# Patient Record
Sex: MALE | Race: BLACK OR AFRICAN AMERICAN | NOT HISPANIC OR LATINO | Employment: FULL TIME | URBAN - METROPOLITAN AREA
[De-identification: names, ages, dates, MRNs, and addresses within clinical notes are randomized per-mention and may not be internally consistent; named-entity substitution may affect disease eponyms.]

---

## 2021-10-08 ENCOUNTER — OCCUPATIONAL HEALTH (OUTPATIENT)
Dept: URGENT CARE | Facility: CLINIC | Age: 26
End: 2021-10-08

## 2021-10-08 PROCEDURE — 99499 UNLISTED E&M SERVICE: CPT | Mod: S$GLB,,, | Performed by: EMERGENCY MEDICINE

## 2021-10-08 PROCEDURE — 99499 PR PHYSICAL - DOT/CDL: ICD-10-PCS | Mod: S$GLB,,, | Performed by: EMERGENCY MEDICINE

## 2023-03-07 ENCOUNTER — HOSPITAL ENCOUNTER (INPATIENT)
Facility: HOSPITAL | Age: 28
LOS: 9 days | Discharge: HOME | DRG: 286 | End: 2023-03-16
Attending: EMERGENCY MEDICINE | Admitting: FAMILY MEDICINE
Payer: COMMERCIAL

## 2023-03-07 DIAGNOSIS — J81.1 PULMONARY EDEMA: ICD-10-CM

## 2023-03-07 DIAGNOSIS — I42.9 CARDIOMYOPATHY: ICD-10-CM

## 2023-03-07 DIAGNOSIS — I50.41 ACUTE COMBINED SYSTOLIC AND DIASTOLIC CONGESTIVE HEART FAILURE: Primary | ICD-10-CM

## 2023-03-07 DIAGNOSIS — R94.31 ABNORMAL EKG: ICD-10-CM

## 2023-03-07 DIAGNOSIS — R07.9 CHEST PAIN: ICD-10-CM

## 2023-03-07 DIAGNOSIS — I50.9 HEART FAILURE: ICD-10-CM

## 2023-03-07 DIAGNOSIS — I25.10 CAD (CORONARY ARTERY DISEASE): ICD-10-CM

## 2023-03-07 PROBLEM — J81.0 ACUTE PULMONARY EDEMA: Status: ACTIVE | Noted: 2023-03-07

## 2023-03-07 PROBLEM — J18.9 RIGHT MIDDLE LOBE PNEUMONIA: Status: ACTIVE | Noted: 2023-03-07

## 2023-03-07 PROBLEM — E87.1 HYPONATREMIA: Status: ACTIVE | Noted: 2023-03-07

## 2023-03-07 PROBLEM — J96.01 ACUTE RESPIRATORY FAILURE WITH HYPOXIA: Status: ACTIVE | Noted: 2023-03-07

## 2023-03-07 PROBLEM — I16.0 HYPERTENSIVE URGENCY: Status: ACTIVE | Noted: 2023-03-07

## 2023-03-07 PROBLEM — R79.89 ELEVATED TROPONIN: Status: ACTIVE | Noted: 2023-03-07

## 2023-03-07 PROBLEM — I10 PRIMARY HYPERTENSION: Status: ACTIVE | Noted: 2023-03-07

## 2023-03-07 LAB
ALBUMIN SERPL BCP-MCNC: 3.7 G/DL (ref 3.5–5.2)
ALLENS TEST: ABNORMAL
ALP SERPL-CCNC: 58 U/L (ref 55–135)
ALT SERPL W/O P-5'-P-CCNC: 27 U/L (ref 10–44)
ANION GAP SERPL CALC-SCNC: 7 MMOL/L (ref 8–16)
AST SERPL-CCNC: 25 U/L (ref 10–40)
BASOPHILS # BLD AUTO: 0.06 K/UL (ref 0–0.2)
BASOPHILS NFR BLD: 0.5 % (ref 0–1.9)
BILIRUB SERPL-MCNC: 1.7 MG/DL (ref 0.1–1)
BNP SERPL-MCNC: 497 PG/ML (ref 0–99)
BUN SERPL-MCNC: 15 MG/DL (ref 6–20)
CALCIUM SERPL-MCNC: 8.4 MG/DL (ref 8.7–10.5)
CHLORIDE SERPL-SCNC: 101 MMOL/L (ref 95–110)
CK SERPL-CCNC: 321 U/L (ref 20–200)
CO2 SERPL-SCNC: 25 MMOL/L (ref 23–29)
CREAT SERPL-MCNC: 1.3 MG/DL (ref 0.5–1.4)
CRP SERPL-MCNC: 5.79 MG/DL
DELSYS: ABNORMAL
DIFFERENTIAL METHOD: ABNORMAL
EOSINOPHIL # BLD AUTO: 0.1 K/UL (ref 0–0.5)
EOSINOPHIL NFR BLD: 0.6 % (ref 0–8)
EP: 10
ERYTHROCYTE [DISTWIDTH] IN BLOOD BY AUTOMATED COUNT: 14.9 % (ref 11.5–14.5)
ERYTHROCYTE [SEDIMENTATION RATE] IN BLOOD BY WESTERGREN METHOD: 18 MM/H
EST. GFR  (NO RACE VARIABLE): >60 ML/MIN/1.73 M^2
FIO2: 50
GLUCOSE SERPL-MCNC: 112 MG/DL (ref 70–110)
HCO3 UR-SCNC: 24.3 MMOL/L (ref 24–28)
HCT VFR BLD AUTO: 37.1 % (ref 40–54)
HGB BLD-MCNC: 12.6 G/DL (ref 14–18)
IMM GRANULOCYTES # BLD AUTO: 0.04 K/UL (ref 0–0.04)
IMM GRANULOCYTES NFR BLD AUTO: 0.3 % (ref 0–0.5)
INFLUENZA A, MOLECULAR: NEGATIVE
INFLUENZA B, MOLECULAR: NEGATIVE
IP: 16
LACTATE SERPL-SCNC: 1.1 MMOL/L (ref 0.5–1.9)
LYMPHOCYTES # BLD AUTO: 3.8 K/UL (ref 1–4.8)
LYMPHOCYTES NFR BLD: 31.9 % (ref 18–48)
MAGNESIUM SERPL-MCNC: 1.9 MG/DL (ref 1.6–2.6)
MCH RBC QN AUTO: 26.3 PG (ref 27–31)
MCHC RBC AUTO-ENTMCNC: 34 G/DL (ref 32–36)
MCV RBC AUTO: 77 FL (ref 82–98)
MIN VOL: 17.5
MODE: ABNORMAL
MONOCYTES # BLD AUTO: 0.9 K/UL (ref 0.3–1)
MONOCYTES NFR BLD: 7.2 % (ref 4–15)
NEUTROPHILS # BLD AUTO: 7.2 K/UL (ref 1.8–7.7)
NEUTROPHILS NFR BLD: 59.5 % (ref 38–73)
NRBC BLD-RTO: 0 /100 WBC
PCO2 BLDA: 39.8 MMHG (ref 35–45)
PH SMN: 7.39 [PH] (ref 7.35–7.45)
PLATELET # BLD AUTO: 329 K/UL (ref 150–450)
PMV BLD AUTO: 11.7 FL (ref 9.2–12.9)
PO2 BLDA: 50 MMHG (ref 80–100)
POC BE: -1 MMOL/L
POC SATURATED O2: 85 % (ref 95–100)
POC TCO2: 25 MMOL/L (ref 23–27)
POTASSIUM SERPL-SCNC: 3.5 MMOL/L (ref 3.5–5.1)
PROCALCITONIN SERPL IA-MCNC: 0.05 NG/ML (ref 0–0.5)
PROT SERPL-MCNC: 7.3 G/DL (ref 6–8.4)
RBC # BLD AUTO: 4.8 M/UL (ref 4.6–6.2)
SAMPLE: ABNORMAL
SARS-COV-2 RDRP RESP QL NAA+PROBE: NEGATIVE
SITE: ABNORMAL
SODIUM SERPL-SCNC: 133 MMOL/L (ref 136–145)
SP02: 97
SPECIMEN SOURCE: NORMAL
SPONT RATE: 18
TROPONIN I SERPL HS-MCNC: 25.3 PG/ML (ref 0–14.9)
TROPONIN I SERPL HS-MCNC: 25.9 PG/ML (ref 0–14.9)
WBC # BLD AUTO: 12.05 K/UL (ref 3.9–12.7)

## 2023-03-07 PROCEDURE — 36415 COLL VENOUS BLD VENIPUNCTURE: CPT | Performed by: EMERGENCY MEDICINE

## 2023-03-07 PROCEDURE — 85025 COMPLETE CBC W/AUTO DIFF WBC: CPT

## 2023-03-07 PROCEDURE — 99900035 HC TECH TIME PER 15 MIN (STAT)

## 2023-03-07 PROCEDURE — 63600175 PHARM REV CODE 636 W HCPCS: Performed by: STUDENT IN AN ORGANIZED HEALTH CARE EDUCATION/TRAINING PROGRAM

## 2023-03-07 PROCEDURE — 83880 ASSAY OF NATRIURETIC PEPTIDE: CPT

## 2023-03-07 PROCEDURE — 94799 UNLISTED PULMONARY SVC/PX: CPT

## 2023-03-07 PROCEDURE — 84484 ASSAY OF TROPONIN QUANT: CPT | Mod: 91 | Performed by: NURSE PRACTITIONER

## 2023-03-07 PROCEDURE — U0002 COVID-19 LAB TEST NON-CDC: HCPCS | Performed by: STUDENT IN AN ORGANIZED HEALTH CARE EDUCATION/TRAINING PROGRAM

## 2023-03-07 PROCEDURE — 83735 ASSAY OF MAGNESIUM: CPT

## 2023-03-07 PROCEDURE — 93010 ELECTROCARDIOGRAM REPORT: CPT | Mod: ,,, | Performed by: INTERNAL MEDICINE

## 2023-03-07 PROCEDURE — 94640 AIRWAY INHALATION TREATMENT: CPT

## 2023-03-07 PROCEDURE — 93010 EKG 12-LEAD: ICD-10-PCS | Mod: ,,, | Performed by: INTERNAL MEDICINE

## 2023-03-07 PROCEDURE — 21400001 HC TELEMETRY ROOM

## 2023-03-07 PROCEDURE — 86140 C-REACTIVE PROTEIN: CPT | Performed by: NURSE PRACTITIONER

## 2023-03-07 PROCEDURE — 80053 COMPREHEN METABOLIC PANEL: CPT

## 2023-03-07 PROCEDURE — 93005 ELECTROCARDIOGRAM TRACING: CPT | Performed by: INTERNAL MEDICINE

## 2023-03-07 PROCEDURE — 94660 CPAP INITIATION&MGMT: CPT

## 2023-03-07 PROCEDURE — 96365 THER/PROPH/DIAG IV INF INIT: CPT

## 2023-03-07 PROCEDURE — 99900031 HC PATIENT EDUCATION (STAT)

## 2023-03-07 PROCEDURE — 84145 PROCALCITONIN (PCT): CPT | Performed by: NURSE PRACTITIONER

## 2023-03-07 PROCEDURE — 25000003 PHARM REV CODE 250: Performed by: STUDENT IN AN ORGANIZED HEALTH CARE EDUCATION/TRAINING PROGRAM

## 2023-03-07 PROCEDURE — 96366 THER/PROPH/DIAG IV INF ADDON: CPT

## 2023-03-07 PROCEDURE — 83605 ASSAY OF LACTIC ACID: CPT | Performed by: STUDENT IN AN ORGANIZED HEALTH CARE EDUCATION/TRAINING PROGRAM

## 2023-03-07 PROCEDURE — 87502 INFLUENZA DNA AMP PROBE: CPT | Performed by: STUDENT IN AN ORGANIZED HEALTH CARE EDUCATION/TRAINING PROGRAM

## 2023-03-07 PROCEDURE — 84484 ASSAY OF TROPONIN QUANT: CPT

## 2023-03-07 PROCEDURE — 27000221 HC OXYGEN, UP TO 24 HOURS

## 2023-03-07 PROCEDURE — 99285 EMERGENCY DEPT VISIT HI MDM: CPT | Mod: 25

## 2023-03-07 PROCEDURE — 96368 THER/DIAG CONCURRENT INF: CPT

## 2023-03-07 PROCEDURE — 25000242 PHARM REV CODE 250 ALT 637 W/ HCPCS: Performed by: NURSE PRACTITIONER

## 2023-03-07 PROCEDURE — 82803 BLOOD GASES ANY COMBINATION: CPT

## 2023-03-07 PROCEDURE — 96367 TX/PROPH/DG ADDL SEQ IV INF: CPT

## 2023-03-07 PROCEDURE — 94761 N-INVAS EAR/PLS OXIMETRY MLT: CPT

## 2023-03-07 PROCEDURE — 82550 ASSAY OF CK (CPK): CPT | Performed by: EMERGENCY MEDICINE

## 2023-03-07 RX ORDER — ALBUTEROL SULFATE 90 UG/1
2 AEROSOL, METERED RESPIRATORY (INHALATION) EVERY 4 HOURS PRN
COMMUNITY
Start: 2023-02-14 | End: 2023-08-29

## 2023-03-07 RX ORDER — ENOXAPARIN SODIUM 100 MG/ML
40 INJECTION SUBCUTANEOUS EVERY 24 HOURS
Status: DISCONTINUED | OUTPATIENT
Start: 2023-03-07 | End: 2023-03-07

## 2023-03-07 RX ORDER — HYDRALAZINE HYDROCHLORIDE 20 MG/ML
10 INJECTION INTRAMUSCULAR; INTRAVENOUS EVERY 6 HOURS PRN
Status: DISCONTINUED | OUTPATIENT
Start: 2023-03-07 | End: 2023-03-16 | Stop reason: HOSPADM

## 2023-03-07 RX ORDER — LOSARTAN POTASSIUM 50 MG/1
50 TABLET ORAL DAILY
Status: ON HOLD | COMMUNITY
Start: 2023-02-01 | End: 2023-03-16 | Stop reason: HOSPADM

## 2023-03-07 RX ORDER — IBUPROFEN 200 MG
24 TABLET ORAL
Status: DISCONTINUED | OUTPATIENT
Start: 2023-03-07 | End: 2023-03-14

## 2023-03-07 RX ORDER — IBUPROFEN 200 MG
16 TABLET ORAL
Status: DISCONTINUED | OUTPATIENT
Start: 2023-03-07 | End: 2023-03-14

## 2023-03-07 RX ORDER — SODIUM,POTASSIUM PHOSPHATES 280-250MG
2 POWDER IN PACKET (EA) ORAL
Status: DISCONTINUED | OUTPATIENT
Start: 2023-03-07 | End: 2023-03-16 | Stop reason: HOSPADM

## 2023-03-07 RX ORDER — TALC
6 POWDER (GRAM) TOPICAL NIGHTLY PRN
Status: DISCONTINUED | OUTPATIENT
Start: 2023-03-07 | End: 2023-03-16 | Stop reason: HOSPADM

## 2023-03-07 RX ORDER — ALBUTEROL SULFATE 90 UG/1
AEROSOL, METERED RESPIRATORY (INHALATION)
COMMUNITY
Start: 2023-02-14 | End: 2023-03-07

## 2023-03-07 RX ORDER — BENZONATATE 100 MG/1
100 CAPSULE ORAL 3 TIMES DAILY
Status: DISCONTINUED | OUTPATIENT
Start: 2023-03-07 | End: 2023-03-11

## 2023-03-07 RX ORDER — NAPROXEN SODIUM 220 MG/1
324 TABLET, FILM COATED ORAL DAILY
Status: DISCONTINUED | OUTPATIENT
Start: 2023-03-07 | End: 2023-03-16 | Stop reason: HOSPADM

## 2023-03-07 RX ORDER — FUROSEMIDE 10 MG/ML
40 INJECTION INTRAMUSCULAR; INTRAVENOUS
Status: DISPENSED | OUTPATIENT
Start: 2023-03-07 | End: 2023-03-08

## 2023-03-07 RX ORDER — NALOXONE HCL 0.4 MG/ML
0.02 VIAL (ML) INJECTION
Status: DISCONTINUED | OUTPATIENT
Start: 2023-03-07 | End: 2023-03-16 | Stop reason: HOSPADM

## 2023-03-07 RX ORDER — SODIUM CHLORIDE 0.9 % (FLUSH) 0.9 %
10 SYRINGE (ML) INJECTION EVERY 12 HOURS PRN
Status: DISCONTINUED | OUTPATIENT
Start: 2023-03-07 | End: 2023-03-16 | Stop reason: HOSPADM

## 2023-03-07 RX ORDER — ENOXAPARIN SODIUM 100 MG/ML
40 INJECTION SUBCUTANEOUS EVERY 12 HOURS
Status: DISCONTINUED | OUTPATIENT
Start: 2023-03-07 | End: 2023-03-16 | Stop reason: HOSPADM

## 2023-03-07 RX ORDER — AMLODIPINE BESYLATE 5 MG/1
10 TABLET ORAL DAILY
Status: DISCONTINUED | OUTPATIENT
Start: 2023-03-08 | End: 2023-03-08

## 2023-03-07 RX ORDER — LANOLIN ALCOHOL/MO/W.PET/CERES
800 CREAM (GRAM) TOPICAL
Status: DISCONTINUED | OUTPATIENT
Start: 2023-03-07 | End: 2023-03-16 | Stop reason: HOSPADM

## 2023-03-07 RX ORDER — LOSARTAN POTASSIUM 25 MG/1
50 TABLET ORAL DAILY
Status: DISCONTINUED | OUTPATIENT
Start: 2023-03-08 | End: 2023-03-07

## 2023-03-07 RX ORDER — CALCIUM CARBONATE 200(500)MG
500 TABLET,CHEWABLE ORAL
Status: DISCONTINUED | OUTPATIENT
Start: 2023-03-08 | End: 2023-03-16 | Stop reason: HOSPADM

## 2023-03-07 RX ORDER — NITROGLYCERIN 20 MG/100ML
5 INJECTION INTRAVENOUS CONTINUOUS
Status: DISCONTINUED | OUTPATIENT
Start: 2023-03-07 | End: 2023-03-07

## 2023-03-07 RX ORDER — LORATADINE 10 MG/1
10 TABLET ORAL DAILY
COMMUNITY
Start: 2023-02-01 | End: 2023-03-07

## 2023-03-07 RX ORDER — LOSARTAN POTASSIUM 50 MG/1
50 TABLET ORAL
COMMUNITY
Start: 2023-02-01 | End: 2023-03-07

## 2023-03-07 RX ORDER — TRIAMTERENE/HYDROCHLOROTHIAZID 37.5-25 MG
1 TABLET ORAL DAILY
Status: DISCONTINUED | OUTPATIENT
Start: 2023-03-08 | End: 2023-03-09

## 2023-03-07 RX ORDER — LEVALBUTEROL INHALATION SOLUTION 0.63 MG/3ML
0.63 SOLUTION RESPIRATORY (INHALATION) EVERY 8 HOURS
Status: DISCONTINUED | OUTPATIENT
Start: 2023-03-08 | End: 2023-03-12

## 2023-03-07 RX ORDER — FUROSEMIDE 10 MG/ML
40 INJECTION INTRAMUSCULAR; INTRAVENOUS
Status: COMPLETED | OUTPATIENT
Start: 2023-03-07 | End: 2023-03-07

## 2023-03-07 RX ORDER — GLUCAGON 1 MG
1 KIT INJECTION
Status: DISCONTINUED | OUTPATIENT
Start: 2023-03-07 | End: 2023-03-14

## 2023-03-07 RX ORDER — FAMOTIDINE 20 MG/1
20 TABLET, FILM COATED ORAL 2 TIMES DAILY
Status: DISCONTINUED | OUTPATIENT
Start: 2023-03-07 | End: 2023-03-16 | Stop reason: HOSPADM

## 2023-03-07 RX ADMIN — ASPIRIN 81 MG CHEWABLE TABLET 324 MG: 81 TABLET CHEWABLE at 05:03

## 2023-03-07 RX ADMIN — NITROGLYCERIN 50 MCG/MIN: 20 INJECTION INTRAVENOUS at 04:03

## 2023-03-07 RX ADMIN — LEVALBUTEROL HYDROCHLORIDE 0.63 MG: 0.63 SOLUTION RESPIRATORY (INHALATION) at 11:03

## 2023-03-07 RX ADMIN — AZITHROMYCIN MONOHYDRATE 500 MG: 500 INJECTION, POWDER, LYOPHILIZED, FOR SOLUTION INTRAVENOUS at 05:03

## 2023-03-07 RX ADMIN — FUROSEMIDE 40 MG: 10 INJECTION, SOLUTION INTRAMUSCULAR; INTRAVENOUS at 07:03

## 2023-03-07 RX ADMIN — CEFTRIAXONE 2 G: 2 INJECTION, SOLUTION INTRAVENOUS at 05:03

## 2023-03-07 NOTE — Clinical Note
The catheter was repositioned into the ostium   right coronary artery. An angiography was performed of the right coronary arteries. Multiple views were taken. The angiography was performed via hand injection with 7 mL of contrast.

## 2023-03-07 NOTE — Clinical Note
The catheter was repositioned into the ostium   left main. An angiography was performed of the left coronary arteries. Multiple views were taken. The angiography was performed via hand injection with 7 mL of contrast.

## 2023-03-07 NOTE — FIRST PROVIDER EVALUATION
Medical screening examination initiated.  I have conducted a focused provider triage encounter, findings are as follows:    Brief history of present illness:  chest pain onset this morning, sharp, non radiating, sob    There were no vitals filed for this visit.    Pertinent physical exam:  diaphoretic, tachycardic, heart rate regular, doesn't appear sob    Brief workup plan: chest pain workup    Preliminary workup initiated; this workup will be continued and followed by the physician or advanced practice provider that is assigned to the patient when roomed.

## 2023-03-07 NOTE — PLAN OF CARE
03/07/23 1530   Patient Assessment/Suction   Level of Consciousness (AVPU) alert   Respiratory Effort Unlabored;Normal   Expansion/Accessory Muscles/Retractions no use of accessory muscles   All Lung Fields Breath Sounds diminished;Posterior:;Anterior:;Lateral:   Rhythm/Pattern, Respiratory unlabored;pattern regular;depth regular   Cough Frequency infrequent   Cough Type dry;nonproductive   Skin Integrity   $ Wound Care Tech Time 15 min   Area Observed Bridge of nose   Skin Appearance without discoloration   Barrier used? Gel Cushion   PRE-TX-O2   Device (Oxygen Therapy) BIPAP   $ Is the patient on Low Flow Oxygen? Yes   Oxygen Concentration (%) 30   SpO2 97 %   Pulse Oximetry Type Continuous   $ Pulse Oximetry - Multiple Charge Pulse Oximetry - Multiple   Pulse (!) 111   Preset CPAP/BiPAP Settings   Mode Of Delivery BiPAP   $ CPAP/BiPAP Daily Charge BiPAP/CPAP Daily   $ Initial CPAP/BiPAP Setup? Yes   $ Is patient using? Yes   Size of Mask Medium/Large   Sized Appropriately? Yes   Equipment Type V60   Airway Device Type medium full face mask   Humidifier not applicable   Ipap 14   EPAP (cm H2O) 8   Pressure Support (cm H2O) 6   Set Rate (Breaths/Min) 18   ITime (sec) 0.95   Rise Time (sec) 3   Patient CPAP/BiPAP Settings   Timed Inspiration (Sec) 0.95   IPAP Rise Time (sec) 3   RR Total (Breaths/Min) 18   Tidal Volume (mL) 710   VE Minute Ventilation (L/min) 22 L/min   Peak Inspiratory Pressure (cm H2O) 15   TiTOT (%) 32   Total Leak (L/Min) 13   Patient Trigger - ST Mode Only (%) 87   CPAP/BiPAP Backup Settings   IPAP Backup 14 cmH2O   EPAP Backup 8 cmH2O   Backup Rate 18 breaths per minute (bpm)   FIO2 Backup 40 %   ITIME Backup 1 seconds   Rise Time Backup 3 seconds   CPAP/BiPAP Alarms   High Pressure (cm H2O) 40   Low Pressure (cm H2O) 10   Minute Ventilation (L/Min) 5   High RR (breaths/min) 40   Low RR (breaths/min) 8   Education   $ Education Bronchodilator;15 min   Respiratory Evaluation   $ Care Plan  Tech Time 15 min   $ Eval/Re-eval Charges Evaluation   Evaluation For New Orders

## 2023-03-07 NOTE — ED PROVIDER NOTES
Encounter Date: 3/7/2023       History     Chief Complaint   Patient presents with    Chest Pain     Acute onset of chest pain during exertion. Allevaited with rest. Pt reports hx of MEJIA. Hx of HTN.      HPI  27-year-old male with history of hypertension presenting for evaluation of worsening shortness of breath and chest pain that occurred today.  Patient reports that he is had shortness of breath for the past 3 months approximately.    Today he had worsening of his shortness of breath and was having sharp exertional/pleuritic substernal chest pain.  Reports coughing pink frothy sputum.    Earlene's  Review of patient's allergies indicates:  No Known Allergies  No past medical history on file.  No past surgical history on file.  No family history on file.     Review of Systems   Constitutional:  Negative for chills, fatigue and fever.   Eyes:  Negative for visual disturbance.   Respiratory:  Positive for cough, chest tightness and shortness of breath.    Cardiovascular:  Positive for chest pain. Negative for palpitations and leg swelling.   Gastrointestinal:  Negative for abdominal pain, diarrhea, nausea and vomiting.   Endocrine: Negative for polyuria.   Genitourinary:  Negative for decreased urine volume and dysuria.   Skin:  Negative for rash and wound.   Neurological:  Negative for light-headedness and headaches.     Physical Exam     Initial Vitals [03/07/23 1457]   BP Pulse Resp Temp SpO2   (!) 194/110 (!) 114 16 99.3 °F (37.4 °C) 95 %      MAP       --         Physical Exam    Constitutional: He appears well-developed and well-nourished. He is not diaphoretic. He is Obese . He is cooperative. He appears ill. He appears distressed. He is not intubated.   Cardiovascular:    Tachycardia present.         Pulmonary/Chest: Accessory muscle usage present. Tachypnea noted. He is not intubated. He has rales.     Neurological: He is alert.       ED Course   Critical Care    Date/Time: 3/7/2023 9:00 PM  Performed by:  Feng Núñez Jr., MD  Authorized by: Elizabeth Chaudhary MD   Total critical care time (exclusive of procedural time) : 36 minutes  Critical care time was exclusive of separately billable procedures and treating other patients.  Critical care was necessary to treat or prevent imminent or life-threatening deterioration of the following conditions: wosening pna and volume overload.  Critical care was time spent personally by me on the following activities: discussions with consultants, evaluation of patient's response to treatment, examination of patient, obtaining history from patient or surrogate and re-evaluation of patient's condition.        PGY-3 MDM:  Wenceslao Jeong is a 27-year-old male with history of hypertension presenting for evaluation of worsening shortness of breath and chest pain that occurred today.      On arrival patient vitals were   Vitals:    03/07/23 1945   BP: (!) 146/74   Pulse: 109   Resp: (!) 33   Temp:      On examination, patient in mild respiratory distress, tachypneic with respiratory rate in the upper 40s, satting upper 80s on room air, placed on 5L NC with improvement to 93-94%, tachycardic, abdomen soft and nontender. Extremities without swelling or calf tenderness    Bedside lung ultrasound with diffuse B-lines at lung apices bilaterally, hepatization of the right lower lung region.     My independent analysis of EKG is Normal rate and rhythm, No ST elevations/depressions, no T-wave inversions, Normal SD interval, normal QRS duration, normal QTC, No previous EKG available for comparison    My independent analysis of the patient's chest xray is Adequate exposure. , Trachea, milagros, and mainstem bronchi visible without significant deviation, No apparent bony abnormality, Cardiac silhouette within normal limits, No evidence of pneumothorax, No evidence of pleural effusion, costophrenic angles sharp, Infiltrates bilaterally fluffy opacities, focal consolidations in right mid/lower lung  fields.    Labs ordered & notable for:   CBC with hemoglobin of 12.6, microcytic.   CMP notable for mild hyponatremia 133, elevated bilirubin total 1.7.  BNP elevated at 497.  High sensitivity troponin 25.9.    Patient was initially placed on 5 L nasal cannula with saturations improving to 93-94%.  He was subsequently transitioned to BiPAP due to concern for pulmonary edema which improved his oxygen saturation and his work of breathing.  He was briefly started on nitroglycerin drip for his hypertension, but this was able to be deescalated quickly as his work of breathing improved, likely decreasing his sympathetic drive.    Was given the following in the emergency department: nitroglycerin drip, lasixc 40mg, azithromycin, ceftriaxone.    Social determinants of health that affect patient include: poor access to healthcare, no primary care provider, financial strain, transportation limitations, lack of physical/social support at home, low health literacy, systemic bias to patients that are nonwhite and/or non-native English-speaking    Decision to admit patient based on new oxygen requirement, severe presentation, and need for close monitor of respiratory status.     Esvin Grajeda MD  [facility], PGY-3  8:10 PM  3/7/2023    Labs Reviewed   MAGNESIUM   CBC W/ AUTO DIFFERENTIAL   COMPREHENSIVE METABOLIC PANEL   TROPONIN I HIGH SENSITIVITY   B-TYPE NATRIURETIC PEPTIDE        ECG Results              EKG 12-lead (In process)  Result time 03/07/23 15:12:45      In process by Interface, Lab In [facility] (03/07/23 15:12:45)                   Narrative:    Test Reason : R07.9,    Vent. Rate : 119 BPM     Atrial Rate : 119 BPM     P-R Int : 152 ms          QRS Dur : 082 ms      QT Int : 346 ms       P-R-T Axes : 051 004 072 degrees     QTc Int : 486 ms    Sinus tachycardia  Possible Left atrial enlargement  Borderline Abnormal ECG  No previous ECGs available    Referred By: [email or web address]   SELF           Confirmed By:                                    Imaging Results    None          Medications   nitroGLYCERIN in 5 % dextrose 50 mg/250 mL (200 mcg/mL) infusion (has no administration in time range)                 ED Course as of 03/07/23 2008   Tue Mar 07, 2023   1630 I have personally seen and examined the patient.  I have reviewed and agree with the resident's findings, including all diagnostic interpretations and treatment plans as documented.  Patient presented with shortness of breath.  Chest x-ray shows likely pneumonia.  Differential diagnosis included CHF versus pneumonia.  Our initial concern was for acute hypertensive emergency as the patient was markedly hypertensive, and short of breath.  He was placed on BiPAP significantly improved his work of breathing.  He additionally, he was mildly hypoxic upon arrival, this also improved with BiPAP.  Laboratory evaluation shows elevated brain atretic peptide at 497.  No previous to compare to.  Patient is 27 years old, appears to have uncontrolled hypertension.  Given elevated brain atretic peptide concerns for early heart failure as well as noted elevated troponin.  Radiology called pneumonia on chest x-ray.  EKG here shows sinus tachycardia at 119 per with no ST elevation or depression and some T-wave inversions normal intervals.  He has been treated with Rocephin and azithromycin.  We have given him an aspirin.  He was also briefly on a nitroglycerin drip but rapidly responded.  He will be admitted to Hospital Medicine in guarded condition.    Feng Núñez MD MPH     []      ED Course User Index  [] Feng Núñez Jr., MD                 Clinical Impression:   Final diagnoses:  [R07.9] Chest pain               Esvin Grajeda MD  Resident  03/07/23 2020       Feng Núñez Jr., MD  03/07/23 2106

## 2023-03-08 ENCOUNTER — CLINICAL SUPPORT (OUTPATIENT)
Dept: CARDIOLOGY | Facility: HOSPITAL | Age: 28
DRG: 286 | End: 2023-03-08
Payer: COMMERCIAL

## 2023-03-08 VITALS — BODY MASS INDEX: 46.23 KG/M2 | WEIGHT: 305 LBS | HEIGHT: 68 IN

## 2023-03-08 LAB
ALBUMIN SERPL BCP-MCNC: 3.4 G/DL (ref 3.5–5.2)
ALP SERPL-CCNC: 57 U/L (ref 55–135)
ALT SERPL W/O P-5'-P-CCNC: 27 U/L (ref 10–44)
ANION GAP SERPL CALC-SCNC: 10 MMOL/L (ref 8–16)
AORTIC ROOT ANNULUS: 2.9 CM
AORTIC VALVE CUSP SEPERATION: 1.8 CM
AST SERPL-CCNC: 25 U/L (ref 10–40)
AV INDEX (PROSTH): 0.49
AV MEAN GRADIENT: 2 MMHG
AV PEAK GRADIENT: 4 MMHG
AV VALVE AREA: 1.53 CM2
AV VELOCITY RATIO: 0.52
BASOPHILS # BLD AUTO: 0.04 K/UL (ref 0–0.2)
BASOPHILS NFR BLD: 0.3 % (ref 0–1.9)
BILIRUB SERPL-MCNC: 1.5 MG/DL (ref 0.1–1)
BSA FOR ECHO PROCEDURE: 2.58 M2
BUN SERPL-MCNC: 16 MG/DL (ref 6–20)
CALCIUM SERPL-MCNC: 8.3 MG/DL (ref 8.7–10.5)
CHLORIDE SERPL-SCNC: 102 MMOL/L (ref 95–110)
CHOLEST SERPL-MCNC: 179 MG/DL (ref 120–199)
CHOLEST/HDLC SERPL: 3.3 {RATIO} (ref 2–5)
CO2 SERPL-SCNC: 26 MMOL/L (ref 23–29)
CREAT SERPL-MCNC: 1.3 MG/DL (ref 0.5–1.4)
CV ECHO LV RWT: 0.39 CM
DIFFERENTIAL METHOD: ABNORMAL
DOP CALC AO PEAK VEL: 1 M/S
DOP CALC AO VTI: 14.2 CM
DOP CALC LVOT AREA: 3.1 CM2
DOP CALC LVOT DIAMETER: 2 CM
DOP CALC LVOT PEAK VEL: 0.52 M/S
DOP CALC LVOT STROKE VOLUME: 21.67 CM3
DOP CALC MV VTI: 24.6 CM
DOP CALCLVOT PEAK VEL VTI: 6.9 CM
E WAVE DECELERATION TIME: 111 MSEC
E/A RATIO: 2.22
E/E' RATIO: 18.71 M/S
ECHO LV POSTERIOR WALL: 1.36 CM (ref 0.6–1.1)
EJECTION FRACTION: 25 %
EOSINOPHIL # BLD AUTO: 0.1 K/UL (ref 0–0.5)
EOSINOPHIL NFR BLD: 0.5 % (ref 0–8)
ERYTHROCYTE [DISTWIDTH] IN BLOOD BY AUTOMATED COUNT: 14.9 % (ref 11.5–14.5)
EST. GFR  (NO RACE VARIABLE): >60 ML/MIN/1.73 M^2
ESTIMATED AVG GLUCOSE: 120 MG/DL (ref 68–131)
FRACTIONAL SHORTENING: 12 % (ref 28–44)
GLUCOSE SERPL-MCNC: 113 MG/DL (ref 70–110)
HBA1C MFR BLD: 5.8 % (ref 4.5–6.2)
HCT VFR BLD AUTO: 34 % (ref 40–54)
HDLC SERPL-MCNC: 55 MG/DL (ref 40–75)
HDLC SERPL: 30.7 % (ref 20–50)
HGB BLD-MCNC: 11.8 G/DL (ref 14–18)
IMM GRANULOCYTES # BLD AUTO: 0.06 K/UL (ref 0–0.04)
IMM GRANULOCYTES NFR BLD AUTO: 0.5 % (ref 0–0.5)
INTERVENTRICULAR SEPTUM: 1.15 CM (ref 0.6–1.1)
LDLC SERPL CALC-MCNC: 105.2 MG/DL (ref 63–159)
LEFT INTERNAL DIMENSION IN SYSTOLE: 6.12 CM (ref 2.1–4)
LEFT VENTRICLE DIASTOLIC VOLUME INDEX: 103.67 ML/M2
LEFT VENTRICLE DIASTOLIC VOLUME: 254 ML
LEFT VENTRICLE MASS INDEX: 175 G/M2
LEFT VENTRICLE SYSTOLIC VOLUME INDEX: 76.7 ML/M2
LEFT VENTRICLE SYSTOLIC VOLUME: 188 ML
LEFT VENTRICULAR INTERNAL DIMENSION IN DIASTOLE: 6.98 CM (ref 3.5–6)
LEFT VENTRICULAR MASS: 428.75 G
LV LATERAL E/E' RATIO: 16.38 M/S
LV SEPTAL E/E' RATIO: 21.83 M/S
LVOT MG: 1 MMHG
LVOT MV: 0.34 CM/S
LYMPHOCYTES # BLD AUTO: 3.1 K/UL (ref 1–4.8)
LYMPHOCYTES NFR BLD: 24 % (ref 18–48)
MAGNESIUM SERPL-MCNC: 1.9 MG/DL (ref 1.6–2.6)
MCH RBC QN AUTO: 26.4 PG (ref 27–31)
MCHC RBC AUTO-ENTMCNC: 34.7 G/DL (ref 32–36)
MCV RBC AUTO: 76 FL (ref 82–98)
MONOCYTES # BLD AUTO: 1 K/UL (ref 0.3–1)
MONOCYTES NFR BLD: 7.6 % (ref 4–15)
MV MEAN GRADIENT: 4 MMHG
MV PEAK A VEL: 0.59 M/S
MV PEAK E VEL: 1.31 M/S
MV PEAK GRADIENT: 8 MMHG
MV VALVE AREA BY CONTINUITY EQUATION: 0.88 CM2
NEUTROPHILS # BLD AUTO: 8.6 K/UL (ref 1.8–7.7)
NEUTROPHILS NFR BLD: 67.1 % (ref 38–73)
NONHDLC SERPL-MCNC: 124 MG/DL
NRBC BLD-RTO: 0 /100 WBC
PHOSPHATE SERPL-MCNC: 3.9 MG/DL (ref 2.7–4.5)
PISA MRMAX VEL: 4.38 M/S
PISA TR MAX VEL: 2.74 M/S
PLATELET # BLD AUTO: 297 K/UL (ref 150–450)
PMV BLD AUTO: 11.7 FL (ref 9.2–12.9)
POTASSIUM SERPL-SCNC: 3.3 MMOL/L (ref 3.5–5.1)
PROT SERPL-MCNC: 7 G/DL (ref 6–8.4)
PV MV: 0.57 M/S
PV PEAK VELOCITY: 0.83 CM/S
RBC # BLD AUTO: 4.47 M/UL (ref 4.6–6.2)
SODIUM SERPL-SCNC: 138 MMOL/L (ref 136–145)
TDI LATERAL: 0.08 M/S
TDI SEPTAL: 0.06 M/S
TDI: 0.07 M/S
TR MAX PG: 30 MMHG
TR MEAN GRADIENT: 25 MMHG
TRICUSPID ANNULAR PLANE SYSTOLIC EXCURSION: 2.8 CM
TRIGL SERPL-MCNC: 94 MG/DL (ref 30–150)
TROPONIN I SERPL HS-MCNC: 27.1 PG/ML (ref 0–14.9)
TSH SERPL DL<=0.005 MIU/L-ACNC: 1.3 UIU/ML (ref 0.34–5.6)
WBC # BLD AUTO: 12.88 K/UL (ref 3.9–12.7)

## 2023-03-08 PROCEDURE — 27000221 HC OXYGEN, UP TO 24 HOURS

## 2023-03-08 PROCEDURE — 99900035 HC TECH TIME PER 15 MIN (STAT)

## 2023-03-08 PROCEDURE — 94640 AIRWAY INHALATION TREATMENT: CPT

## 2023-03-08 PROCEDURE — 21400001 HC TELEMETRY ROOM

## 2023-03-08 PROCEDURE — 25000003 PHARM REV CODE 250: Performed by: NURSE PRACTITIONER

## 2023-03-08 PROCEDURE — 84100 ASSAY OF PHOSPHORUS: CPT | Performed by: NURSE PRACTITIONER

## 2023-03-08 PROCEDURE — 93306 TTE W/DOPPLER COMPLETE: CPT | Mod: 26,,, | Performed by: INTERNAL MEDICINE

## 2023-03-08 PROCEDURE — 63600175 PHARM REV CODE 636 W HCPCS: Performed by: NURSE PRACTITIONER

## 2023-03-08 PROCEDURE — 83735 ASSAY OF MAGNESIUM: CPT | Performed by: NURSE PRACTITIONER

## 2023-03-08 PROCEDURE — 80053 COMPREHEN METABOLIC PANEL: CPT | Performed by: NURSE PRACTITIONER

## 2023-03-08 PROCEDURE — 93010 ELECTROCARDIOGRAM REPORT: CPT | Mod: ,,, | Performed by: INTERNAL MEDICINE

## 2023-03-08 PROCEDURE — 94660 CPAP INITIATION&MGMT: CPT

## 2023-03-08 PROCEDURE — 93306 ECHO (CUPID ONLY): ICD-10-PCS | Mod: 26,,, | Performed by: INTERNAL MEDICINE

## 2023-03-08 PROCEDURE — 25000242 PHARM REV CODE 250 ALT 637 W/ HCPCS: Performed by: NURSE PRACTITIONER

## 2023-03-08 PROCEDURE — 85025 COMPLETE CBC W/AUTO DIFF WBC: CPT | Performed by: NURSE PRACTITIONER

## 2023-03-08 PROCEDURE — 63700000 PHARM REV CODE 250 ALT 637 W/O HCPCS: Performed by: HOSPITALIST

## 2023-03-08 PROCEDURE — 63600175 PHARM REV CODE 636 W HCPCS: Performed by: FAMILY MEDICINE

## 2023-03-08 PROCEDURE — 25000003 PHARM REV CODE 250: Performed by: HOSPITALIST

## 2023-03-08 PROCEDURE — 84484 ASSAY OF TROPONIN QUANT: CPT | Performed by: NURSE PRACTITIONER

## 2023-03-08 PROCEDURE — 93005 ELECTROCARDIOGRAM TRACING: CPT | Performed by: INTERNAL MEDICINE

## 2023-03-08 PROCEDURE — 93010 EKG 12-LEAD: ICD-10-PCS | Mod: ,,, | Performed by: INTERNAL MEDICINE

## 2023-03-08 PROCEDURE — 94761 N-INVAS EAR/PLS OXIMETRY MLT: CPT

## 2023-03-08 PROCEDURE — 84443 ASSAY THYROID STIM HORMONE: CPT | Performed by: NURSE PRACTITIONER

## 2023-03-08 PROCEDURE — 25500020 PHARM REV CODE 255: Performed by: HOSPITALIST

## 2023-03-08 PROCEDURE — 25000003 PHARM REV CODE 250: Performed by: STUDENT IN AN ORGANIZED HEALTH CARE EDUCATION/TRAINING PROGRAM

## 2023-03-08 PROCEDURE — 93306 TTE W/DOPPLER COMPLETE: CPT

## 2023-03-08 PROCEDURE — 80061 LIPID PANEL: CPT | Performed by: NURSE PRACTITIONER

## 2023-03-08 PROCEDURE — 99900031 HC PATIENT EDUCATION (STAT)

## 2023-03-08 PROCEDURE — 83036 HEMOGLOBIN GLYCOSYLATED A1C: CPT | Performed by: NURSE PRACTITIONER

## 2023-03-08 RX ORDER — LOSARTAN POTASSIUM 50 MG/1
50 TABLET ORAL DAILY
Status: DISCONTINUED | OUTPATIENT
Start: 2023-03-08 | End: 2023-03-09

## 2023-03-08 RX ORDER — AZITHROMYCIN 250 MG/1
250 TABLET, FILM COATED ORAL DAILY
Status: DISCONTINUED | OUTPATIENT
Start: 2023-03-08 | End: 2023-03-11

## 2023-03-08 RX ADMIN — BENZONATATE 100 MG: 100 CAPSULE ORAL at 10:03

## 2023-03-08 RX ADMIN — CALCIUM CARBONATE (ANTACID) CHEW TAB 500 MG 500 MG: 500 CHEW TAB at 10:03

## 2023-03-08 RX ADMIN — ASPIRIN 81 MG CHEWABLE TABLET 324 MG: 81 TABLET CHEWABLE at 10:03

## 2023-03-08 RX ADMIN — FAMOTIDINE 20 MG: 20 TABLET ORAL at 10:03

## 2023-03-08 RX ADMIN — AZITHROMYCIN MONOHYDRATE 250 MG: 250 TABLET ORAL at 05:03

## 2023-03-08 RX ADMIN — LEVALBUTEROL HYDROCHLORIDE 0.63 MG: 0.63 SOLUTION RESPIRATORY (INHALATION) at 03:03

## 2023-03-08 RX ADMIN — ENOXAPARIN SODIUM 40 MG: 100 INJECTION SUBCUTANEOUS at 10:03

## 2023-03-08 RX ADMIN — CALCIUM CARBONATE (ANTACID) CHEW TAB 500 MG 500 MG: 500 CHEW TAB at 05:03

## 2023-03-08 RX ADMIN — FUROSEMIDE 40 MG: 10 INJECTION, SOLUTION INTRAMUSCULAR; INTRAVENOUS at 10:03

## 2023-03-08 RX ADMIN — POTASSIUM BICARBONATE 50 MEQ: 977.5 TABLET, EFFERVESCENT ORAL at 10:03

## 2023-03-08 RX ADMIN — POTASSIUM BICARBONATE 50 MEQ: 977.5 TABLET, EFFERVESCENT ORAL at 11:03

## 2023-03-08 RX ADMIN — CALCIUM CARBONATE (ANTACID) CHEW TAB 500 MG 500 MG: 500 CHEW TAB at 03:03

## 2023-03-08 RX ADMIN — IOHEXOL 100 ML: 350 INJECTION, SOLUTION INTRAVENOUS at 10:03

## 2023-03-08 RX ADMIN — TRIAMTERENE AND HYDROCHLOROTHIAZIDE 1 TABLET: 37.5; 25 TABLET ORAL at 10:03

## 2023-03-08 RX ADMIN — LEVALBUTEROL HYDROCHLORIDE 0.63 MG: 0.63 SOLUTION RESPIRATORY (INHALATION) at 07:03

## 2023-03-08 RX ADMIN — LOSARTAN POTASSIUM 50 MG: 25 TABLET, FILM COATED ORAL at 10:03

## 2023-03-08 RX ADMIN — BENZONATATE 100 MG: 100 CAPSULE ORAL at 03:03

## 2023-03-08 NOTE — ASSESSMENT & PLAN NOTE
BNP elevated.  Patient with cardiac wheezes and BLE edema appreciated on assessment.   Plan for  IV diuretics  Cardiac diet  Strict I/Os and daily weights  Echo in AM          Quinolones Counseling:  I discussed with the patient the risks of fluoroquinolones including but not limited to GI upset, allergic reaction, drug rash, diarrhea, dizziness, photosensitivity, yeast infections, liver function test abnormalities, tendonitis/tendon rupture.

## 2023-03-08 NOTE — CARE UPDATE
03/08/23 0728   Patient Assessment/Suction   Level of Consciousness (AVPU) alert   Respiratory Effort Normal;Unlabored   Expansion/Accessory Muscles/Retractions expansion symmetric   All Lung Fields Breath Sounds clear;diminished   Rhythm/Pattern, Respiratory unlabored   PRE-TX-O2   Device (Oxygen Therapy) nasal cannula   $ Is the patient on Low Flow Oxygen? Yes   Flow (L/min) 4   SpO2 97 %   Pulse Oximetry Type Continuous   $ Pulse Oximetry - Multiple Charge Pulse Oximetry - Multiple   Pulse 98   Resp 20   Aerosol Therapy   $ Aerosol Therapy Charges Aerosol Treatment   Daily Review of Necessity (SVN) completed   Respiratory Treatment Status (SVN) given   Treatment Route (SVN) mask;air   Patient Position (SVN) semi-Garner's   Post Treatment Assessment (SVN) increased aeration   Signs of Intolerance (SVN) none   Breath Sounds Post-Respiratory Treatment   Throughout All Fields Post-Treatment All Fields   Throughout All Fields Post-Treatment aeration increased   Post-treatment Heart Rate (beats/min) 100   Post-treatment Resp Rate (breaths/min) 20   Preset CPAP/BiPAP Settings   Mode Of Delivery Standby   $ CPAP/BiPAP Daily Charge BiPAP/CPAP Daily   Education   $ Education Bronchodilator;BiPAP;DME Oxygen;15 min   Respiratory Evaluation   $ Care Plan Tech Time 15 min

## 2023-03-08 NOTE — PROGRESS NOTES
[facility] Medicine  Progress Note    Patient name: Wenceslao Jeong  MRN: [id number]  Admit Date: 3/7/2023   LOS: 1 day     SUBJECTIVE:     Principal problem: Hypertensive urgency    Interval History:  Patient was seen and examined bedside.  Still remains on supplemental oxygen.  Blood pressure is better.  Subjectively feels better.  Denies any new symptoms.  Patient tells me he was not taking any blood pressure medications for last 1 week.  No other acute events overnight as per nursing staff.     Scheduled Meds:   aspirin  324 mg Oral Daily    benzonatate  100 mg Oral TID    calcium carbonate  500 mg Oral TID WM    enoxaparin  40 mg Subcutaneous Q12H    famotidine  20 mg Oral BID    furosemide (LASIX) injection  40 mg Intravenous Q12H    levalbuterol  0.63 mg Nebulization Q8H    losartan  50 mg Oral Daily    potassium bicarbonate  50 mEq Oral BID    triamterene-hydrochlorothiazide 37.5-25 mg  1 tablet Oral Daily     Continuous Infusions:  PRN Meds:dextrose 10%, dextrose 10%, glucagon (human recombinant), glucose, glucose, hydrALAZINE, ibuprofen, magnesium oxide, magnesium oxide, melatonin, naloxone, potassium bicarbonate, potassium bicarbonate, potassium bicarbonate, potassium, sodium phosphates, potassium, sodium phosphates, potassium, sodium phosphates, sodium chloride 0.9%    Review of patient's allergies indicates:  No Known Allergies    Review of Systems: As per interval history    OBJECTIVE:     Vital Signs (Most Recent)  Temp: 99.3 °F (37.4 °C) (03/07/23 1457)  Pulse: 98 (03/08/23 0728)  Resp: 20 (03/08/23 0728)  BP: (!) 142/82 (03/08/23 1012)  SpO2: 97 % (03/08/23 0728)    Vital Signs Range (Last 24H):  Temp:  [99.3 °F (37.4 °C)]   Pulse:  []   Resp:  [16-48]   BP: (114-194)/()   SpO2:  [91 %-99 %]     I & O (Last 24H):  Intake/Output Summary (Last 24 hours) at 3/8/2023 1057  Last data filed at 3/8/2023 0022  Gross per 24 hour   Intake 300 ml   Output 2400 ml   Net -2100  ml       Physical Exam:  General:  Morbidly obese gentleman, Appears as stated age.  Anxious  Eyes: No conjunctival injection. No scleral icterus.  ENT: Hearing grossly intact. No discharge from ears. No nasal discharge.   Neck: Supple, trachea midline. No JVD  CVS: RRR. No LE edema BL  Lungs:  On supplemental oxygen, bilateral crackles noted   Abdomen:  Soft, nontender and nondistended.  No organomegaly  Neuro: AOx3. Moves all extremities. Follows commands. Responds appropriately     Laboratory:  I have reviewed all pertinent lab results within the past 24 hours.    Diagnostic Results:  Reviewed all imaging    ASSESSMENT/PLAN:     27 year old morbidly obese man who was supposed to be on antihypertensive but was not taking anything for last 1 week came with shortness of breath, cough found to have hypertensive crisis with pulmonary edema.  There was a concern of pneumonia on admission.     Active Hospital Problems    Diagnosis  POA    *Hypertensive urgency [I16.0]  Yes    Acute pulmonary edema [J81.0]  Yes    Right middle/lower lobe pneumonia [J18.9]  Yes    Primary hypertension [I10]  Yes    Elevated troponin [R77.8]  Yes    Hyponatremia [E87.1]  Yes    Acute respiratory failure with hypoxia [J96.01]  Yes      Resolved Hospital Problems   No resolved problems to display.         Plan:   Overall presentation is consistent with hypertensive crisis resulting into pulmonary edema and respiratory failure.  Patient was not taking any antihypertensives for last 1 week.   Started HCTZ triamterene, losartan.  Received couple doses of IV Lasix  CTA chest to rule out pulmonary embolism  2D echo to rule out any cardiomyopathy  Reviewing chest x-ray I may consider azithromycin for atypical pneumonia  Serial EKGs, cardiac enzymes, telemetry monitoring  Patient tells me that he snores, may benefit from outpatient sleep study  Further management as per clinical course      VTE Risk Mitigation (From admission, onward)            Ordered     enoxaparin injection 40 mg  Every 12 hours         03/07/23 1938     Place BRANDIE hose  Until discontinued         03/07/23 1930     IP VTE HIGH RISK PATIENT  Once         03/07/23 1930     Place sequential compression device  Until discontinued         03/07/23 1930                        Department Hospital Medicine  Critical access hospital  Desi Mays MD  Date of service: 03/08/2023

## 2023-03-08 NOTE — ASSESSMENT & PLAN NOTE
Patient with Hypoxic Respiratory failure which is Acute.  he is not on home oxygen. Supplemental oxygen was provided and noted- Oxygen Concentration (%):  [30] 30.   Signs/symptoms of respiratory failure include- respiratory distress. Contributing diagnoses includes - Pneumonia +/- acute pulmonary edema.  Labs and images were reviewed. Patient Has recent ABG, which has been reviewed. Will treat underlying causes and adjust management of respiratory failure as follows- Bipap PRN  O2 PRN - keep sats >93%, Pulse oximetry q 4 with vital signs  Nebulizer treatments q8 hours   IS q2 while awake

## 2023-03-08 NOTE — ASSESSMENT & PLAN NOTE
Elevated BNP    Denies CP. Likely 2/2 SD with HTN urgency. EKG is non-ischemic.   Plan for continuous cardiac monitoring. Continue to trend serial troponins q6 hours X 2.  Echo pending.

## 2023-03-08 NOTE — ASSESSMENT & PLAN NOTE
GROUP THERAPY PROGRESS NOTE    Patient did not participate in Self-care group.     MERCED Kennedy, [facility] Patient has a current diagnosis of hypertensive urgency (without evidence of end organ damage) which is controlled.  Latest blood pressure and vitals reviewed-   Temp:  [99.3 °F (37.4 °C)]   Pulse:  [109-118]   Resp:  [16-27]   BP: (114-194)/()   SpO2:  [91 %-99 %] .   Patient currently off IV antihypertensives.   Home meds for hypertension were reviewed and noted below.   Hypertension Medications             losartan (COZAAR) 50 MG tablet Take 50 mg by mouth once daily.          Medication adjustment for hospital antihypertensives is as follows- Losartan discontinued (see above), started on CCB and diuretic.     Will aim for controlled BP reduction by medications noted above. Monitor and mitigate end organ damage as indicated.

## 2023-03-08 NOTE — H&P
[facility] [place] - Emergency Dept  Hospital Medicine  History & Physical    Patient Name: Wenceslao Jeong  MRN: [id number]  Patient Class: IP- Inpatient  Admission Date: 3/7/2023  Attending Physician: Elizabeth Chaudhary MD   Primary Care Provider: BELKYS Wheeler         Patient information was obtained from patient, caregiver / friend and ER records.     Subjective:     Principal Problem:Hypertensive urgency    Chief Complaint:   Chief Complaint   Patient presents with    Chest Pain     Acute onset of chest pain during exertion. Allevaited with rest. Pt reports hx of MEJIA. Hx of HTN.         HPI: Wenceslao Jeong is a 27 y.o. male with a history as  has no past medical history on file. who presented to the ED with a Chest Pain (Acute onset of chest pain during exertion. Allevaited with rest. Pt reports hx of MEJIA. Hx of HTN. )    Patient presents to the ED with a c/o SOB for about 3 weeks worsening on last 2-3 days and now with cough with pink foamy sputum. Patient reports he was seen by his PCP because he had been SOB and was told he did not have pneumonia, however was started on BP medications (losartan 50 mg daily) February 1, 2023.  Patient further states he was supposed to follow up with PCP to see if the losartan was working but he missed the appointment because his job was short and he had to fill in.     Denies fever, chills, diaphoresis, dizziness, HA, chest pain, palpitations, NVD, recent trauma or any other associated symptoms. No aggravating and alleviating factor.  Does not smoke cigarettes, drink or do illegal drugs.     Lab and imaging obtained and reviewed. CBC shows H/H 12.6/37.1 MCV 77 MCH 26.3 RDW 14.9. CMP shows Na+ 133 AG 7 glucose 112 Ca+ 8.4 T. Bili 1.7. . Initial Troponin 25.9. EKG shows sinus tachycardia; with possible left atrial enlargement. CXR shows right mid and lower lung pneumonia. On admit, temp 99.3 Hr 114 RR 16 /110 sats 95% on RA         Per ED provider,  "patient presented with SOB and productive cough "pink frothy sputum". CXR was completed and showed right mid/lower PNA. Started on IV ABX. However, BNP was elevated and given symptom, a bedside Echo was completed which per ED provider more consistent with HF with EF approx 50%. BP significantly elevated tridil gtt initiated, with good BP controlled, tridil gtt discontinued. Patient was also noted to have a decrease in sats to 91% on 5 liters O2, placed on Bipap with O2 sats improving, now currently on 2L o2 per NC with O2 sats 96% on assessment.                No past medical history on file.    No past surgical history on file.    Review of patient's allergies indicates:  No Known Allergies    No current facility-administered medications on file prior to encounter.     Current Outpatient Medications on File Prior to Encounter   Medication Sig    albuterol (PROVENTIL/VENTOLIN HFA) 90 mcg/actuation inhaler Inhale 2 puffs into the lungs every 4 (four) hours as needed.    losartan (COZAAR) 50 MG tablet Take 50 mg by mouth once daily.    [DISCONTINUED] albuterol (PROVENTIL/VENTOLIN HFA) 90 mcg/actuation inhaler Inhale into the lungs.    [DISCONTINUED] loratadine (CLARITIN) 10 mg tablet Take 10 mg by mouth once daily.    [DISCONTINUED] losartan (COZAAR) 50 MG tablet Take 50 mg by mouth.     Family History    None       Tobacco Use    Smoking status: Not on file    Smokeless tobacco: Not on file   Substance and Sexual Activity    Alcohol use: Not on file    Drug use: Not on file    Sexual activity: Not on file     Review of Systems   Constitutional:  Negative for chills, diaphoresis and fever.   HENT:  Negative for congestion, postnasal drip, sinus pressure and sore throat.    Eyes:  Negative for visual disturbance.   Respiratory:  Positive for cough (pink foamy sputum) and shortness of breath. Negative for chest tightness and wheezing.    Cardiovascular:  Positive for leg swelling. Negative for chest pain and " palpitations.   Gastrointestinal:  Negative for abdominal distention, abdominal pain, blood in stool, constipation, diarrhea, nausea and vomiting.   Endocrine: Negative.    Genitourinary:  Negative for dysuria.   Musculoskeletal: Negative.    Skin: Negative.    Allergic/Immunologic: Negative.    Neurological:  Negative for dizziness, weakness, numbness and headaches.   Hematological: Negative.    Psychiatric/Behavioral: Negative.     Objective:     Vital Signs (Most Recent):  Temp: 99.3 °F (37.4 °C) (03/07/23 1457)  Pulse: (!) 112 (03/07/23 1734)  Resp: (!) 27 (03/07/23 1734)  BP: (!) 114/90 (03/07/23 1840)  SpO2: 97 % (03/07/23 1734)   Vital Signs (24h Range):  Temp:  [99.3 °F (37.4 °C)] 99.3 °F (37.4 °C)  Pulse:  [109-118] 112  Resp:  [16-27] 27  SpO2:  [91 %-99 %] 97 %  BP: (114-194)/() 114/90     Weight: (!) 138.3 kg (305 lb)  Body mass index is 46.38 kg/m².    Physical Exam  Constitutional:       General: He is not in acute distress.     Appearance: Normal appearance. He is well-developed. He is not toxic-appearing or diaphoretic.   HENT:      Head: Normocephalic and atraumatic.   Eyes:      General: Lids are normal.      Conjunctiva/sclera: Conjunctivae normal.      Pupils: Pupils are equal, round, and reactive to light.   Neck:      Thyroid: No thyroid mass or thyromegaly.      Vascular: Normal carotid pulses. No JVD.      Trachea: Trachea normal. No tracheal deviation.   Cardiovascular:      Rate and Rhythm: Regular rhythm. Tachycardia present.      Pulses: Normal pulses.      Heart sounds: Normal heart sounds, S1 normal and S2 normal.   Pulmonary:      Effort: Pulmonary effort is normal. No tachypnea, accessory muscle usage or respiratory distress.      Breath sounds: Normal breath sounds. No stridor.   Abdominal:      General: Bowel sounds are normal.      Palpations: Abdomen is soft.      Tenderness: There is no abdominal tenderness.   Musculoskeletal:         General: Normal range of motion.       Cervical back: Full passive range of motion without pain, normal range of motion and neck supple.      Right lower le+ Edema present.      Left lower le+ Edema present.   Skin:     General: Skin is warm and dry.      Nails: There is no clubbing.   Neurological:      Mental Status: He is alert and oriented to person, place, and time.      Cranial Nerves: No cranial nerve deficit.      Sensory: No sensory deficit.   Psychiatric:         Speech: Speech normal.         Behavior: Behavior normal. Behavior is cooperative.         Thought Content: Thought content normal.         Judgment: Judgment normal.         CRANIAL NERVES     CN III, IV, VI   Pupils are equal, round, and reactive to light.     Significant Labs: All pertinent labs within the past 24 hours have been reviewed.  ABGs:   Recent Labs   Lab 23  1734   PH 7.393   PCO2 39.8   HCO3 24.3   POCSATURATED 85*   BE -1   PO2 50*     Bilirubin:   Recent Labs   Lab 23  1528   BILITOT 1.7*     BMP:   Recent Labs   Lab 23  1528   *   *   K 3.5      CO2 25   BUN 15   CREATININE 1.3   CALCIUM 8.4*   MG 1.9     CBC:   Recent Labs   Lab 23  1528   WBC 12.05   HGB 12.6*   HCT 37.1*        CMP:   Recent Labs   Lab 23  1528   *   K 3.5      CO2 25   *   BUN 15   CREATININE 1.3   CALCIUM 8.4*   PROT 7.3   ALBUMIN 3.7   BILITOT 1.7*   ALKPHOS 58   AST 25   ALT 27   ANIONGAP 7*     Cardiac Markers:   Recent Labs   Lab 23  1528   *     Lactic Acid:   Recent Labs   Lab 23  1657   LACTATE 1.1     Magnesium:   Recent Labs   Lab 23  1528   MG 1.9     Troponin:   Recent Labs   Lab 23  1528   TROPONINIHS 25.9*       Significant Imaging: I have reviewed all pertinent imaging results/findings within the past 24 hours.  X-Ray Chest AP Portable    Result Date: 3/7/2023  HISTORY: Chest Pain FINDINGS: Portable chest radiograph at 1516 hours with no prior studies for comparison  shows the cardiomediastinal silhouette and pulmonary vasculature are within normal limits. The lungs are normally expanded, with patchy right mid and lower lung interstitial and airspace opacities, partially obscuring the diaphragm. The right upper lung and left lung are clear, with no large pleural effusion, evidence of pulmonary edema, or pneumothorax. The bones are normal. IMPRESSION: Right mid and lower lung pneumonia. Electronically signed by:  Darrin Roland MD  3/7/2023 3:55 PM CST Workstation: [id number]       Assessment/Plan:     Active Hospital Problems    Diagnosis  POA    *Hypertensive urgency [I16.0]  Yes     Priority: 1 - High    Acute pulmonary edema [J81.0]  Yes     Priority: 2     Right middle/lower lobe pneumonia [J18.9]  Yes     Priority: 3     Primary hypertension [I10]  Yes    Elevated troponin [R77.8]  Yes    Hyponatremia [E87.1]  Yes    Acute respiratory failure with hypoxia [J96.01]  Yes      Resolved Hospital Problems   No resolved problems to display.     Plan:  Admit to Inpatient - right mid/lower lobe PNA; acute pulmonary edema; HTN urgency; acute respiratory failure with hypoxia  Continuous cardiac monitor  Pulse oximetry O2 PRN - keep sats >94%, pulse ox q4 with vital signs  Daily labs  Electrolytes sliding scale repletion  Further plan as per clinical course      * Hypertensive urgency  Patient has a current diagnosis of hypertensive urgency (without evidence of end organ damage) which is controlled.  Latest blood pressure and vitals reviewed-   Temp:  [99.3 °F (37.4 °C)]   Pulse:  [109-118]   Resp:  [16-27]   BP: (114-194)/()   SpO2:  [91 %-99 %] .   Patient currently off IV antihypertensives.   Home meds for hypertension were reviewed and noted below.   Hypertension Medications             losartan (COZAAR) 50 MG tablet Take 50 mg by mouth once daily.          Medication adjustment for hospital antihypertensives is as follows- Losartan discontinued (see above), started on  CCB and diuretic.     Will aim for controlled BP reduction by medications noted above. Monitor and mitigate end organ damage as indicated.    Acute pulmonary edema  BNP elevated.  Patient with cardiac wheezes and BLE edema appreciated on assessment.   Plan for  IV diuretics  Cardiac diet  Strict I/Os and daily weights  Echo in AM           Right middle/lower lobe pneumonia  CXR completed and showed right mid and lower lung pneumonia. Patient with acute hypoxia with PO2 50 and POC sats 85%, placed on Bipap with sats improving. Hs is afebrile and without leukocytosis. Lactic acid level within normal range.     O2 PRN - keep sats >94%, pulse oximetry q 4 with vital signs  Xopenex q8 hours, currently ST on monitor   Continue empiric IV ABX for now, deescalate pending procal/inflammatory makers  Tessalon pearls  IS             Acute respiratory failure with hypoxia  Patient with Hypoxic Respiratory failure which is Acute.  he is not on home oxygen. Supplemental oxygen was provided and noted- Oxygen Concentration (%):  [30] 30.   Signs/symptoms of respiratory failure include- respiratory distress. Contributing diagnoses includes - Pneumonia +/- acute pulmonary edema.  Labs and images were reviewed. Patient Has recent ABG, which has been reviewed. Will treat underlying causes and adjust management of respiratory failure as follows- Bipap PRN  O2 PRN - keep sats >93%, Pulse oximetry q 4 with vital signs  Nebulizer treatments q8 hours   IS q2 while awake          Hyponatremia  Monitor      Elevated troponin  Elevated BNP    Denies CP. Likely 2/2 SD with HTN urgency. EKG is non-ischemic.   Plan for continuous cardiac monitoring. Continue to trend serial troponins q6 hours X 2.  Echo pending.           Primary hypertension  See HPI. EKG show left atrial enlargement, likely with elevated BP for a while. Patient recently diagnosed with HTN ad was started on losartan 50 mg daily. He does report running out of medications x1 week  ago. Unclear if cough he is experiencing from the ARB versus acute pulmonary edema after abruptly stopping medication.     Discontinue losartan  Start amlodipine and Dyazide - will hold until out of acute episode  IV hydrazine PRN, with parmeters   Echo pending      VTE Risk Mitigation (From admission, onward)         Ordered     enoxaparin injection 40 mg  Every 12 hours         03/07/23 1938     Place BRANDIE hose  Until discontinued         03/07/23 1930     IP VTE HIGH RISK PATIENT  Once         03/07/23 1930     Place sequential compression device  Until discontinued         03/07/23 1930                   BELKYS Davila  Department of Hospital Medicine   [facility] - Emergency Dept

## 2023-03-08 NOTE — HPI
"Wenceslao Jeong is a 27 y.o. male with a history as  has no past medical history on file. who presented to the ED with a Chest Pain (Acute onset of chest pain during exertion. Allevaited with rest. Pt reports hx of MEJIA. Hx of HTN. )    Patient presents to the ED with a c/o SOB for about 3 weeks worsening on last 2-3 days and now with cough with pink foamy sputum. Patient reports he was seen by his PCP because he had been SOB and was told he did not have pneumonia, however was started on BP medications (losartan 50 mg daily) February 1, 2023.  Patient further states he was supposed to follow up with PCP to see if the losartan was working but he missed the appointment because his job was short and he had to fill in.     Denies fever, chills, diaphoresis, dizziness, HA, chest pain, palpitations, NVD, recent trauma or any other associated symptoms. No aggravating and alleviating factor.  Does not smoke cigarettes, drink or do illegal drugs.     Lab and imaging obtained and reviewed. CBC shows H/H 12.6/37.1 MCV 77 MCH 26.3 RDW 14.9. CMP shows Na+ 133 AG 7 glucose 112 Ca+ 8.4 T. Bili 1.7. . Initial Troponin 25.9. EKG shows sinus tachycardia; with possible left atrial enlargement. CXR shows right mid and lower lung pneumonia. On admit, temp 99.3 Hr 114 RR 16 /110 sats 95% on RA         Per ED provider, patient presented with SOB and productive cough "pink frothy sputum". CXR was completed and showed right mid/lower PNA. Started on IV ABX. However, BNP was elevated and given symptom, a bedside Echo was completed which per ED provider more consistent with HF with EF approx 50%. BP significantly elevated tridil gtt initiated, with good BP controlled, tridil gtt discontinued. Patient was also noted to have a decrease in sats to 91% on 5 liters O2, placed on Bipap with O2 sats improving, now currently on 2L o2 per NC with O2 sats 96% on assessment.            "

## 2023-03-08 NOTE — ASSESSMENT & PLAN NOTE
See HPI. EKG show left atrial enlargement, likely with elevated BP for a while. Patient recently diagnosed with HTN ad was started on losartan 50 mg daily. He does report running out of medications x1 week ago. Unclear if cough he is experiencing from the ARB versus acute pulmonary edema after abruptly stopping medication.     Discontinue losartan  Start amlodipine and Dyazide - will hold until out of acute episode  IV hydrazine PRN, with parmeters   Echo pending

## 2023-03-08 NOTE — SUBJECTIVE & OBJECTIVE
No past medical history on file.    No past surgical history on file.    Review of patient's allergies indicates:  No Known Allergies    No current facility-administered medications on file prior to encounter.     Current Outpatient Medications on File Prior to Encounter   Medication Sig    albuterol (PROVENTIL/VENTOLIN HFA) 90 mcg/actuation inhaler Inhale 2 puffs into the lungs every 4 (four) hours as needed.    losartan (COZAAR) 50 MG tablet Take 50 mg by mouth once daily.    [DISCONTINUED] albuterol (PROVENTIL/VENTOLIN HFA) 90 mcg/actuation inhaler Inhale into the lungs.    [DISCONTINUED] loratadine (CLARITIN) 10 mg tablet Take 10 mg by mouth once daily.    [DISCONTINUED] losartan (COZAAR) 50 MG tablet Take 50 mg by mouth.     Family History    None       Tobacco Use    Smoking status: Not on file    Smokeless tobacco: Not on file   Substance and Sexual Activity    Alcohol use: Not on file    Drug use: Not on file    Sexual activity: Not on file     Review of Systems   Constitutional:  Negative for chills, diaphoresis and fever.   HENT:  Negative for congestion, postnasal drip, sinus pressure and sore throat.    Eyes:  Negative for visual disturbance.   Respiratory:  Positive for cough (pink foamy sputum) and shortness of breath. Negative for chest tightness and wheezing.    Cardiovascular:  Positive for leg swelling. Negative for chest pain and palpitations.   Gastrointestinal:  Negative for abdominal distention, abdominal pain, blood in stool, constipation, diarrhea, nausea and vomiting.   Endocrine: Negative.    Genitourinary:  Negative for dysuria.   Musculoskeletal: Negative.    Skin: Negative.    Allergic/Immunologic: Negative.    Neurological:  Negative for dizziness, weakness, numbness and headaches.   Hematological: Negative.    Psychiatric/Behavioral: Negative.     Objective:     Vital Signs (Most Recent):  Temp: 99.3 °F (37.4 °C) (03/07/23 1457)  Pulse: (!) 112 (03/07/23 1734)  Resp: (!) 27 (03/07/23  1734)  BP: (!) 114/90 (23 1840)  SpO2: 97 % (23 1734)   Vital Signs (24h Range):  Temp:  [99.3 °F (37.4 °C)] 99.3 °F (37.4 °C)  Pulse:  [109-118] 112  Resp:  [16-27] 27  SpO2:  [91 %-99 %] 97 %  BP: (114-194)/() 114/90     Weight: (!) 138.3 kg (305 lb)  Body mass index is 46.38 kg/m².    Physical Exam  Constitutional:       General: He is not in acute distress.     Appearance: Normal appearance. He is well-developed. He is not toxic-appearing or diaphoretic.   HENT:      Head: Normocephalic and atraumatic.   Eyes:      General: Lids are normal.      Conjunctiva/sclera: Conjunctivae normal.      Pupils: Pupils are equal, round, and reactive to light.   Neck:      Thyroid: No thyroid mass or thyromegaly.      Vascular: Normal carotid pulses. No JVD.      Trachea: Trachea normal. No tracheal deviation.   Cardiovascular:      Rate and Rhythm: Regular rhythm. Tachycardia present.      Pulses: Normal pulses.      Heart sounds: Normal heart sounds, S1 normal and S2 normal.   Pulmonary:      Effort: Pulmonary effort is normal. No tachypnea, accessory muscle usage or respiratory distress.      Breath sounds: Normal breath sounds. No stridor.   Abdominal:      General: Bowel sounds are normal.      Palpations: Abdomen is soft.      Tenderness: There is no abdominal tenderness.   Musculoskeletal:         General: Normal range of motion.      Cervical back: Full passive range of motion without pain, normal range of motion and neck supple.      Right lower le+ Edema present.      Left lower le+ Edema present.   Skin:     General: Skin is warm and dry.      Nails: There is no clubbing.   Neurological:      Mental Status: He is alert and oriented to person, place, and time.      Cranial Nerves: No cranial nerve deficit.      Sensory: No sensory deficit.   Psychiatric:         Speech: Speech normal.         Behavior: Behavior normal. Behavior is cooperative.         Thought Content: Thought content normal.          Judgment: Judgment normal.         CRANIAL NERVES     CN III, IV, VI   Pupils are equal, round, and reactive to light.     Significant Labs: All pertinent labs within the past 24 hours have been reviewed.  ABGs:   Recent Labs   Lab 03/07/23  1734   PH 7.393   PCO2 39.8   HCO3 24.3   POCSATURATED 85*   BE -1   PO2 50*     Bilirubin:   Recent Labs   Lab 03/07/23  1528   BILITOT 1.7*     BMP:   Recent Labs   Lab 03/07/23  1528   *   *   K 3.5      CO2 25   BUN 15   CREATININE 1.3   CALCIUM 8.4*   MG 1.9     CBC:   Recent Labs   Lab 03/07/23  1528   WBC 12.05   HGB 12.6*   HCT 37.1*        CMP:   Recent Labs   Lab 03/07/23  1528   *   K 3.5      CO2 25   *   BUN 15   CREATININE 1.3   CALCIUM 8.4*   PROT 7.3   ALBUMIN 3.7   BILITOT 1.7*   ALKPHOS 58   AST 25   ALT 27   ANIONGAP 7*     Cardiac Markers:   Recent Labs   Lab 03/07/23  1528   *     Lactic Acid:   Recent Labs   Lab 03/07/23  1657   LACTATE 1.1     Magnesium:   Recent Labs   Lab 03/07/23  1528   MG 1.9     Troponin:   Recent Labs   Lab 03/07/23  1528   TROPONINIHS 25.9*       Significant Imaging: I have reviewed all pertinent imaging results/findings within the past 24 hours.  X-Ray Chest AP Portable    Result Date: 3/7/2023  HISTORY: Chest Pain FINDINGS: Portable chest radiograph at 1516 hours with no prior studies for comparison shows the cardiomediastinal silhouette and pulmonary vasculature are within normal limits. The lungs are normally expanded, with patchy right mid and lower lung interstitial and airspace opacities, partially obscuring the diaphragm. The right upper lung and left lung are clear, with no large pleural effusion, evidence of pulmonary edema, or pneumothorax. The bones are normal. IMPRESSION: Right mid and lower lung pneumonia. Electronically signed by:  Darrin Roland MD  3/7/2023 3:55 PM [facility] Workstation: [id number]      present x 4 quadrants

## 2023-03-09 PROBLEM — I16.0 HYPERTENSIVE URGENCY: Status: RESOLVED | Noted: 2023-03-07 | Resolved: 2023-03-09

## 2023-03-09 PROBLEM — J96.01 ACUTE RESPIRATORY FAILURE WITH HYPOXIA: Status: RESOLVED | Noted: 2023-03-07 | Resolved: 2023-03-09

## 2023-03-09 PROBLEM — I50.41 ACUTE COMBINED SYSTOLIC AND DIASTOLIC CONGESTIVE HEART FAILURE: Status: ACTIVE | Noted: 2023-03-09

## 2023-03-09 PROBLEM — R79.89 ELEVATED TROPONIN: Status: RESOLVED | Noted: 2023-03-07 | Resolved: 2023-03-09

## 2023-03-09 LAB
ALBUMIN SERPL BCP-MCNC: 3.3 G/DL (ref 3.5–5.2)
ALP SERPL-CCNC: 51 U/L (ref 55–135)
ALT SERPL W/O P-5'-P-CCNC: 29 U/L (ref 10–44)
ANION GAP SERPL CALC-SCNC: 9 MMOL/L (ref 8–16)
AST SERPL-CCNC: 26 U/L (ref 10–40)
BASOPHILS # BLD AUTO: 0.05 K/UL (ref 0–0.2)
BASOPHILS NFR BLD: 0.5 % (ref 0–1.9)
BILIRUB SERPL-MCNC: 1.2 MG/DL (ref 0.1–1)
BUN SERPL-MCNC: 23 MG/DL (ref 6–20)
CALCIUM SERPL-MCNC: 8.5 MG/DL (ref 8.7–10.5)
CHLORIDE SERPL-SCNC: 99 MMOL/L (ref 95–110)
CO2 SERPL-SCNC: 28 MMOL/L (ref 23–29)
CREAT SERPL-MCNC: 1.4 MG/DL (ref 0.5–1.4)
DIFFERENTIAL METHOD: ABNORMAL
EOSINOPHIL # BLD AUTO: 0.2 K/UL (ref 0–0.5)
EOSINOPHIL NFR BLD: 2.1 % (ref 0–8)
ERYTHROCYTE [DISTWIDTH] IN BLOOD BY AUTOMATED COUNT: 15.2 % (ref 11.5–14.5)
EST. GFR  (NO RACE VARIABLE): >60 ML/MIN/1.73 M^2
GLUCOSE SERPL-MCNC: 117 MG/DL (ref 70–110)
HCT VFR BLD AUTO: 35.7 % (ref 40–54)
HGB BLD-MCNC: 12.2 G/DL (ref 14–18)
IMM GRANULOCYTES # BLD AUTO: 0.02 K/UL (ref 0–0.04)
IMM GRANULOCYTES NFR BLD AUTO: 0.2 % (ref 0–0.5)
LYMPHOCYTES # BLD AUTO: 3.4 K/UL (ref 1–4.8)
LYMPHOCYTES NFR BLD: 35.5 % (ref 18–48)
MAGNESIUM SERPL-MCNC: 2.2 MG/DL (ref 1.6–2.6)
MCH RBC QN AUTO: 26.2 PG (ref 27–31)
MCHC RBC AUTO-ENTMCNC: 34.2 G/DL (ref 32–36)
MCV RBC AUTO: 77 FL (ref 82–98)
MONOCYTES # BLD AUTO: 0.8 K/UL (ref 0.3–1)
MONOCYTES NFR BLD: 8.7 % (ref 4–15)
NEUTROPHILS # BLD AUTO: 5.1 K/UL (ref 1.8–7.7)
NEUTROPHILS NFR BLD: 53 % (ref 38–73)
NRBC BLD-RTO: 0 /100 WBC
PHOSPHATE SERPL-MCNC: 4.8 MG/DL (ref 2.7–4.5)
PLATELET # BLD AUTO: 265 K/UL (ref 150–450)
PMV BLD AUTO: 12 FL (ref 9.2–12.9)
POTASSIUM SERPL-SCNC: 3.9 MMOL/L (ref 3.5–5.1)
PROT SERPL-MCNC: 7 G/DL (ref 6–8.4)
RBC # BLD AUTO: 4.65 M/UL (ref 4.6–6.2)
SODIUM SERPL-SCNC: 136 MMOL/L (ref 136–145)
WBC # BLD AUTO: 9.54 K/UL (ref 3.9–12.7)

## 2023-03-09 PROCEDURE — 83735 ASSAY OF MAGNESIUM: CPT | Performed by: NURSE PRACTITIONER

## 2023-03-09 PROCEDURE — 94761 N-INVAS EAR/PLS OXIMETRY MLT: CPT

## 2023-03-09 PROCEDURE — 36415 COLL VENOUS BLD VENIPUNCTURE: CPT | Performed by: NURSE PRACTITIONER

## 2023-03-09 PROCEDURE — 25000242 PHARM REV CODE 250 ALT 637 W/ HCPCS: Performed by: NURSE PRACTITIONER

## 2023-03-09 PROCEDURE — 94799 UNLISTED PULMONARY SVC/PX: CPT

## 2023-03-09 PROCEDURE — 94660 CPAP INITIATION&MGMT: CPT

## 2023-03-09 PROCEDURE — 99900031 HC PATIENT EDUCATION (STAT)

## 2023-03-09 PROCEDURE — 99900035 HC TECH TIME PER 15 MIN (STAT)

## 2023-03-09 PROCEDURE — 63700000 PHARM REV CODE 250 ALT 637 W/O HCPCS: Performed by: HOSPITALIST

## 2023-03-09 PROCEDURE — 80053 COMPREHEN METABOLIC PANEL: CPT | Performed by: NURSE PRACTITIONER

## 2023-03-09 PROCEDURE — 85025 COMPLETE CBC W/AUTO DIFF WBC: CPT | Performed by: NURSE PRACTITIONER

## 2023-03-09 PROCEDURE — 99223 PR INITIAL HOSPITAL CARE,LEVL III: ICD-10-PCS | Mod: ,,, | Performed by: INTERNAL MEDICINE

## 2023-03-09 PROCEDURE — 25000003 PHARM REV CODE 250: Performed by: STUDENT IN AN ORGANIZED HEALTH CARE EDUCATION/TRAINING PROGRAM

## 2023-03-09 PROCEDURE — 99223 1ST HOSP IP/OBS HIGH 75: CPT | Mod: ,,, | Performed by: INTERNAL MEDICINE

## 2023-03-09 PROCEDURE — 94640 AIRWAY INHALATION TREATMENT: CPT

## 2023-03-09 PROCEDURE — 25000003 PHARM REV CODE 250: Performed by: HOSPITALIST

## 2023-03-09 PROCEDURE — 63600175 PHARM REV CODE 636 W HCPCS: Performed by: HOSPITALIST

## 2023-03-09 PROCEDURE — 21400001 HC TELEMETRY ROOM

## 2023-03-09 PROCEDURE — 84100 ASSAY OF PHOSPHORUS: CPT | Performed by: NURSE PRACTITIONER

## 2023-03-09 PROCEDURE — 25000003 PHARM REV CODE 250: Performed by: NURSE PRACTITIONER

## 2023-03-09 PROCEDURE — 63600175 PHARM REV CODE 636 W HCPCS: Performed by: FAMILY MEDICINE

## 2023-03-09 RX ORDER — LANOLIN ALCOHOL/MO/W.PET/CERES
400 CREAM (GRAM) TOPICAL
Status: DISCONTINUED | OUTPATIENT
Start: 2023-03-09 | End: 2023-03-13

## 2023-03-09 RX ORDER — FUROSEMIDE 10 MG/ML
40 INJECTION INTRAMUSCULAR; INTRAVENOUS
Status: DISCONTINUED | OUTPATIENT
Start: 2023-03-09 | End: 2023-03-10

## 2023-03-09 RX ORDER — SPIRONOLACTONE 25 MG/1
25 TABLET ORAL DAILY
Status: DISCONTINUED | OUTPATIENT
Start: 2023-03-09 | End: 2023-03-16 | Stop reason: HOSPADM

## 2023-03-09 RX ORDER — POTASSIUM CHLORIDE 20 MEQ/1
20 TABLET, EXTENDED RELEASE ORAL
Status: DISCONTINUED | OUTPATIENT
Start: 2023-03-09 | End: 2023-03-13

## 2023-03-09 RX ORDER — ATORVASTATIN CALCIUM 20 MG/1
20 TABLET, FILM COATED ORAL NIGHTLY
Status: DISCONTINUED | OUTPATIENT
Start: 2023-03-09 | End: 2023-03-16 | Stop reason: HOSPADM

## 2023-03-09 RX ORDER — LOSARTAN POTASSIUM 50 MG/1
50 TABLET ORAL NIGHTLY
Status: DISCONTINUED | OUTPATIENT
Start: 2023-03-09 | End: 2023-03-11

## 2023-03-09 RX ORDER — SODIUM CHLORIDE 0.9 % (FLUSH) 0.9 %
2 SYRINGE (ML) INJECTION
Status: DISCONTINUED | OUTPATIENT
Start: 2023-03-09 | End: 2023-03-16 | Stop reason: HOSPADM

## 2023-03-09 RX ORDER — METOPROLOL TARTRATE 25 MG/1
25 TABLET, FILM COATED ORAL 2 TIMES DAILY
Status: DISCONTINUED | OUTPATIENT
Start: 2023-03-09 | End: 2023-03-16 | Stop reason: HOSPADM

## 2023-03-09 RX ORDER — ISOSORBIDE MONONITRATE 30 MG/1
30 TABLET, EXTENDED RELEASE ORAL DAILY
Status: DISCONTINUED | OUTPATIENT
Start: 2023-03-09 | End: 2023-03-16 | Stop reason: HOSPADM

## 2023-03-09 RX ADMIN — ASPIRIN 81 MG CHEWABLE TABLET 324 MG: 81 TABLET CHEWABLE at 08:03

## 2023-03-09 RX ADMIN — LEVALBUTEROL HYDROCHLORIDE 0.63 MG: 0.63 SOLUTION RESPIRATORY (INHALATION) at 07:03

## 2023-03-09 RX ADMIN — METOPROLOL TARTRATE 25 MG: 25 TABLET, FILM COATED ORAL at 09:03

## 2023-03-09 RX ADMIN — BENZONATATE 100 MG: 100 CAPSULE ORAL at 08:03

## 2023-03-09 RX ADMIN — MAGNESIUM OXIDE 400 MG (241.3 MG MAGNESIUM) TABLET 400 MG: at 04:03

## 2023-03-09 RX ADMIN — IBUPROFEN 600 MG: 400 TABLET ORAL at 09:03

## 2023-03-09 RX ADMIN — SPIRONOLACTONE 25 MG: 25 TABLET, FILM COATED ORAL at 08:03

## 2023-03-09 RX ADMIN — FAMOTIDINE 20 MG: 20 TABLET ORAL at 09:03

## 2023-03-09 RX ADMIN — FUROSEMIDE 40 MG: 10 INJECTION, SOLUTION INTRAMUSCULAR; INTRAVENOUS at 04:03

## 2023-03-09 RX ADMIN — LEVALBUTEROL HYDROCHLORIDE 0.63 MG: 0.63 SOLUTION RESPIRATORY (INHALATION) at 03:03

## 2023-03-09 RX ADMIN — LEVALBUTEROL HYDROCHLORIDE 0.63 MG: 0.63 SOLUTION RESPIRATORY (INHALATION) at 12:03

## 2023-03-09 RX ADMIN — ISOSORBIDE MONONITRATE 30 MG: 30 TABLET, EXTENDED RELEASE ORAL at 08:03

## 2023-03-09 RX ADMIN — BENZONATATE 100 MG: 100 CAPSULE ORAL at 04:03

## 2023-03-09 RX ADMIN — BENZONATATE 100 MG: 100 CAPSULE ORAL at 09:03

## 2023-03-09 RX ADMIN — FUROSEMIDE 40 MG: 10 INJECTION, SOLUTION INTRAMUSCULAR; INTRAVENOUS at 08:03

## 2023-03-09 RX ADMIN — CALCIUM CARBONATE (ANTACID) CHEW TAB 500 MG 500 MG: 500 CHEW TAB at 04:03

## 2023-03-09 RX ADMIN — CALCIUM CARBONATE (ANTACID) CHEW TAB 500 MG 500 MG: 500 CHEW TAB at 08:03

## 2023-03-09 RX ADMIN — AZITHROMYCIN MONOHYDRATE 250 MG: 250 TABLET ORAL at 08:03

## 2023-03-09 RX ADMIN — LEVALBUTEROL HYDROCHLORIDE 0.63 MG: 0.63 SOLUTION RESPIRATORY (INHALATION) at 11:03

## 2023-03-09 RX ADMIN — LOSARTAN POTASSIUM 50 MG: 50 TABLET, FILM COATED ORAL at 09:03

## 2023-03-09 RX ADMIN — ATORVASTATIN CALCIUM 20 MG: 20 TABLET, FILM COATED ORAL at 09:03

## 2023-03-09 RX ADMIN — METOPROLOL TARTRATE 25 MG: 25 TABLET, FILM COATED ORAL at 08:03

## 2023-03-09 RX ADMIN — ENOXAPARIN SODIUM 40 MG: 100 INJECTION SUBCUTANEOUS at 08:03

## 2023-03-09 RX ADMIN — FAMOTIDINE 20 MG: 20 TABLET ORAL at 08:03

## 2023-03-09 RX ADMIN — CALCIUM CARBONATE (ANTACID) CHEW TAB 500 MG 500 MG: 500 CHEW TAB at 12:03

## 2023-03-09 RX ADMIN — POTASSIUM CHLORIDE 20 MEQ: 1500 TABLET, EXTENDED RELEASE ORAL at 04:03

## 2023-03-09 RX ADMIN — POTASSIUM CHLORIDE 20 MEQ: 1500 TABLET, EXTENDED RELEASE ORAL at 08:03

## 2023-03-09 RX ADMIN — ENOXAPARIN SODIUM 40 MG: 100 INJECTION SUBCUTANEOUS at 09:03

## 2023-03-09 NOTE — CARE UPDATE
03/09/23 0744   Patient Assessment/Suction   Level of Consciousness (AVPU) alert   Respiratory Effort Normal;Unlabored   Expansion/Accessory Muscles/Retractions no use of accessory muscles   All Lung Fields Breath Sounds clear;diminished   PRE-TX-O2   Device (Oxygen Therapy) room air   SpO2 95 %   Pulse Oximetry Type Intermittent   Pulse 96   Resp 16   Aerosol Therapy   $ Aerosol Therapy Charges Aerosol Treatment   Daily Review of Necessity (SVN) completed   Respiratory Treatment Status (SVN) given   Treatment Route (SVN) mask;oxygen   Patient Position (SVN) HOB elevated   Post Treatment Assessment (SVN) breath sounds unchanged   Signs of Intolerance (SVN) none   Respiratory Evaluation   $ Care Plan Tech Time 15 min

## 2023-03-09 NOTE — PLAN OF CARE
Problem: Adult Inpatient Plan of Care  Goal: Plan of Care Review  Outcome: Ongoing, Progressing  Goal: Patient-Specific Goal (Individualized)  Outcome: Ongoing, Progressing  Goal: Absence of Hospital-Acquired Illness or Injury  Outcome: Ongoing, Progressing  Goal: Optimal Comfort and Wellbeing  Outcome: Ongoing, Progressing  Goal: Readiness for Transition of Care  Outcome: Ongoing, Progressing     Problem: Bariatric Environmental Safety  Goal: Safety Maintained with Care  Outcome: Ongoing, Progressing     Problem: Fluid Imbalance (Pneumonia)  Goal: Fluid Balance  Outcome: Ongoing, Progressing     Problem: Infection (Pneumonia)  Goal: Resolution of Infection Signs and Symptoms  Outcome: Ongoing, Progressing     Problem: Respiratory Compromise (Pneumonia)  Goal: Effective Oxygenation and Ventilation  Outcome: Ongoing, Progressing     Problem: Fall Injury Risk  Goal: Absence of Fall and Fall-Related Injury  Outcome: Ongoing, Progressing

## 2023-03-09 NOTE — PLAN OF CARE
Problem: Adult Inpatient Plan of Care  Goal: Plan of Care Review  Outcome: Ongoing, Progressing     Problem: Adult Inpatient Plan of Care  Goal: Optimal Comfort and Wellbeing  Outcome: Ongoing, Progressing     Problem: Bariatric Environmental Safety  Goal: Safety Maintained with Care  Outcome: Ongoing, Progressing     Problem: Infection (Pneumonia)  Goal: Resolution of Infection Signs and Symptoms  Outcome: Ongoing, Progressing     Problem: Respiratory Compromise (Pneumonia)  Goal: Effective Oxygenation and Ventilation  Outcome: Ongoing, Progressing     Problem: Fall Injury Risk  Goal: Absence of Fall and Fall-Related Injury  Outcome: Ongoing, Progressing

## 2023-03-09 NOTE — PLAN OF CARE
[facility]  Initial Discharge Assessment       Primary Care Provider: BELKYS Wheeler    Admission Diagnosis: Pulmonary edema [J81.1]  Hypertensive crisis [I16.9]    Admission Date: 3/7/2023  Expected Discharge Date:     Discharge Barriers Identified: None    Payor: [facility] SHIELD / Plan: BCBS ALL OUT OF STATE / Product Type: PPO /     Extended Emergency Contact Information  Primary Emergency Contact: Love Avila  Mobile Phone: [phone]  Relation: Other  Preferred language: English   needed? No  Secondary Emergency Contact: Fady Pedraza  Mobile Phone: [phone]  Relation: Other  Preferred language: English   needed? No    Discharge Plan A: Home with family  Discharge Plan B: Home with family      [id number] - [place], [place] - [address] AT [facility] [address]  [address]  [place] [address]  Phone: [phone] Fax: [phone]    Assessment completed at bedside with Pt and Kavita Anthony ([phone]).  Confirmed all information on facesheet, and changed Pt's pharmacy to his preferred pharmacy at [facility] in [place].  Pt currently lives with his brother and denies having any previous DME, HH or dialysis.  He was active and independent prior to admission, and worked full time.  He denies having addressed any advance directives in the past.  His discharge plan is to return to home, where he lives with his brother.      Pt and Godmother report that Pt has been attempting to get established with Dr. Brito with [facility], and requested assistance with obtaining follow up appointment at discharge.  SW sent request for new Pt appointment and will await response.    Pt's brother or Godmother will provide Pt with transportation home at discharge, and can assist Pt as needed.  Will continue to follow.    Initial Assessment (most recent)       Adult Discharge Assessment - 03/09/23 1332          Discharge  Assessment    Assessment Type Discharge Planning Assessment     Confirmed/corrected address, phone number and insurance Yes     Confirmed Demographics Correct on Facesheet     Source of Information health record     Communicated CAROLINE with patient/caregiver Date not available/Unable to determine     People in Home sibling(s)     Facility Arrived From: Home     Do you expect to return to your current living situation? Yes     Do you have help at home or someone to help you manage your care at home? Yes     Who are your caregiver(s) and their phone number(s)? Kavita Anthony ([phone])   Remi Jeong, brother     Prior to hospitilization cognitive status: Alert/Oriented     Current cognitive status: Alert/Oriented     Equipment Currently Used at Home none     Readmission within 30 days? No     Patient currently being followed by outpatient case management? No     Do you currently have service(s) that help you manage your care at home? No     Do you take prescription medications? Yes     Do you have prescription coverage? Yes     Coverage BCBS Out of State     Do you have any problems affording any of your prescribed medications? No     Is the patient taking medications as prescribed? yes     Who is going to help you get home at discharge? Kavita Anthony ([phone])   Remi Jeong, brother     How do you get to doctors appointments? car, drives self     Are you on dialysis? No     Do you take coumadin? No     Discharge Plan A Home with family     Discharge Plan B Home with family     DME Needed Upon Discharge  none     Discharge Plan discussed with: Patient;Caregiver     Name(s) and Number(s) Kavita Anthony ([phone])     Discharge Barriers Identified None

## 2023-03-09 NOTE — CARE UPDATE
03/08/23 2055   Patient Assessment/Suction   Level of Consciousness (AVPU) alert   Respiratory Effort Normal   Expansion/Accessory Muscles/Retractions no use of accessory muscles   All Lung Fields Breath Sounds clear   Rhythm/Pattern, Respiratory unlabored   PRE-TX-O2   Device (Oxygen Therapy) BIPAP   Oxygen Concentration (%) 50   Pulse Oximetry Type Intermittent   $ Pulse Oximetry - Multiple Charge Pulse Oximetry - Multiple   Ready to Wean/Extubation Screen   FIO2<=50 (chart decimal) 0.5   Preset CPAP/BiPAP Settings   Mode Of Delivery BiPAP S/T   Equipment Type V60   Ipap 16   EPAP (cm H2O) 10   Pressure Support (cm H2O) 6   Set Rate (Breaths/Min) 18   Patient CPAP/BiPAP Settings   Timed Inspiration (Sec) 0.95   IPAP Rise Time (sec) 3   RR Total (Breaths/Min) 18   Tidal Volume (mL) 844   Peak Inspiratory Pressure (cm H2O) 16   TiTOT (%) 40   Total Leak (L/Min) 0   Patient Trigger - ST Mode Only (%) 54   CPAP/BiPAP Alarms   High Pressure (cm H2O) 40   Low Pressure (cm H2O) 10   Minute Ventilation (L/Min) 16   High RR (breaths/min) 40   Low RR (breaths/min) 8

## 2023-03-09 NOTE — PROGRESS NOTES
[facility] Medicine  Progress Note    Patient name: Wenceslao Jeong  MRN: [id number]  Admit Date: 3/7/2023   LOS: 2 days     SUBJECTIVE:     Principal problem: Acute combined systolic and diastolic congestive heart failure    Interval History:  Patient was seen and examined bedside.  Upon my entering the room patient was ambulating on room air.  Denies any new symptoms, feels much better compared to yesterday. Blood pressure is better.  No acute events overnight as per nursing staff, no wide complex tachycardia on telemetry.  Unfortunately 2D echo confirmed systolic and diastolic CHF    Scheduled Meds:   aspirin  324 mg Oral Daily    atorvastatin  20 mg Oral QHS    azithromycin  250 mg Oral Daily    benzonatate  100 mg Oral TID    calcium carbonate  500 mg Oral TID WM    enoxaparin  40 mg Subcutaneous Q12H    famotidine  20 mg Oral BID    furosemide (LASIX) injection  40 mg Intravenous BID AC    isosorbide mononitrate  30 mg Oral Daily    levalbuterol  0.63 mg Nebulization Q8H    losartan  50 mg Oral QHS    magnesium oxide  400 mg Oral BID AC    metoprolol tartrate  25 mg Oral BID    potassium chloride  20 mEq Oral BID AC    spironolactone  25 mg Oral Daily     Continuous Infusions:  PRN Meds:dextrose 10%, dextrose 10%, glucagon (human recombinant), glucose, glucose, hydrALAZINE, ibuprofen, magnesium oxide, magnesium oxide, melatonin, naloxone, potassium bicarbonate, potassium bicarbonate, potassium bicarbonate, potassium, sodium phosphates, potassium, sodium phosphates, potassium, sodium phosphates, sodium chloride 0.9%, sodium chloride 0.9%    Review of patient's allergies indicates:  No Known Allergies    Review of Systems: As per interval history    OBJECTIVE:     Vital Signs (Most Recent)  Temp: 98 °F (36.7 °C) (03/09/23 1612)  Pulse: 97 (03/09/23 1612)  Resp: 17 (03/09/23 1612)  BP: 123/67 (03/09/23 1612)  SpO2: 95 % (03/09/23 1612)    Vital Signs Range (Last 24H):  Temp:  [97.6 °F (36.4  °C)-98.3 °F (36.8 °C)]   Pulse:  [85-99]   Resp:  [16-20]   BP: (113-132)/(60-82)   SpO2:  [95 %-99 %]     I & O (Last 24H):  Intake/Output Summary (Last 24 hours) at 3/9/2023 1722  Last data filed at 3/8/2023 2224  Gross per 24 hour   Intake 360 ml   Output 200 ml   Net 160 ml       Physical Exam:  General:  Morbidly obese gentleman, Appears as stated age.  Anxious  Eyes: No conjunctival injection. No scleral icterus.  ENT: Hearing grossly intact. No discharge from ears. No nasal discharge.   Neck: Supple, trachea midline. No JVD  CVS: RRR. No LE edema BL  Lungs:  On room air, bilateral crackles noted   Abdomen:  Soft, nontender and nondistended.  No organomegaly  Neuro: AOx3. Moves all extremities. Follows commands. Responds appropriately     Laboratory:  I have reviewed all pertinent lab results within the past 24 hours.    Diagnostic Results:  Reviewed all imaging    ASSESSMENT/PLAN:     27 year old morbidly obese man who was supposed to be on antihypertensive but was not taking anything for last 1 week came with shortness of breath, cough found to have hypertensive crisis with pulmonary edema.  There was a concern of pneumonia on admission.     Active Hospital Problems    Diagnosis  POA    *Acute combined systolic and diastolic congestive heart failure [I50.41]  Yes    Acute pulmonary edema [J81.0]  Yes    Primary hypertension [I10]  Yes    Hyponatremia [E87.1]  Yes      Resolved Hospital Problems    Diagnosis Date Resolved POA    Hypertensive urgency [I16.0] 03/09/2023 Yes    Elevated troponin [R77.8] 03/09/2023 Yes    Acute respiratory failure with hypoxia [J96.01] 03/09/2023 Yes         Plan:   Overall presentation was consistent with decompensated CHF, 2D echo showed EF 25% and grade 3 diastolic dysfunction-new diagnosis  Aggressive IV diuresis with aggressive electrolyte replacement considering risk of wide complex tachycardia  Losartan, Imdur, low-dose beta-blocker, Aldactone  CTA chest ruled out PE, will  continue azithromycin as possibility of atypical pneumonia can not be ruled out  Consult cardiology  Serial EKGs, cardiac enzymes, telemetry monitoring  Patient tells me that he snores, may benefit from outpatient sleep study  Further management as per clinical course  Provided extensive education on new diagnosis of CHF.  Patient was understandably emotional regarding new diagnosis of CHF.     VTE Risk Mitigation (From admission, onward)           Ordered     enoxaparin injection 40 mg  Every 12 hours         03/07/23 1938     Place BRANDIE hose  Until discontinued         03/07/23 1930     IP VTE HIGH RISK PATIENT  Once         03/07/23 1930     Place sequential compression device  Until discontinued         03/07/23 1930                        Department Hospital Medicine  [facility]  Desi Mays MD  Date of service: 03/09/2023

## 2023-03-09 NOTE — CONSULTS
Former[facility]  Department of Cardiology  Consult Note      PATIENT NAME: Wenceslao Jeong  MRN: [id number]  TODAY'S DATE: 03/09/2023  ADMIT DATE: 3/7/2023                          CONSULT REQUESTED BY: Desi Mays MD    SUBJECTIVE     PRINCIPAL PROBLEM: Hypertensive urgency      REASON FOR CONSULT:    Newly diagnosed systolic HF      HPI:     Patient is a 27-year-old male with past medical history of hypertension who presented to the ED with acute onset of chest pain and shortness of breath with exertion, alleviated with rest for the past 3 weeks, worsening over the past 2-3 days.  Patient also reports a cough with pink foamy sputum.  Patient was recently started on losartan 50 mg daily on February 1, 2023 by his PCP.  Patient denies fever, chills, diaphoresis, lightheadedness, dizziness, palpitations, edema or bleeding.  Chest x-ray shows right middle and lower lung pneumonia.  CT of the chest also shows multifocal pneumonia.  Blood pressure significantly elevated in ED and patient was started on a Tridil drip.  , echocardiogram performed with EF of 25% and grade 3 diastolic dysfunction, moderate MR/TR.  Patient was also found to be hypoxic and placed on 5 L and subsequently placed on BiPAP for worsening dyspnea.    During examination, patient is alert and oriented, on room air, no acute distress, sinus rhythm on telemetry.  Patient states he is feeling much better and denies active chest pain or shortness of breath.      FROM H&P:  HPI: Wenceslao Jeong is a 27 y.o. male with a history as  has no past medical history on file. who presented to the ED with a Chest Pain (Acute onset of chest pain during exertion. Allevaited with rest. Pt reports hx of MEJIA. Hx of HTN. )     Patient presents to the ED with a c/o SOB for about 3 weeks worsening on last 2-3 days and now with cough with pink foamy sputum. Patient reports he was seen by his PCP because he had been SOB and was told he did not have  "pneumonia, however was started on BP medications (losartan 50 mg daily) February 1, 2023.  Patient further states he was supposed to follow up with PCP to see if the losartan was working but he missed the appointment because his job was short and he had to fill in.      Denies fever, chills, diaphoresis, dizziness, HA, chest pain, palpitations, NVD, recent trauma or any other associated symptoms. No aggravating and alleviating factor.  Does not smoke cigarettes, drink or do illegal drugs.      Lab and imaging obtained and reviewed. CBC shows H/H 12.6/37.1 MCV 77 MCH 26.3 RDW 14.9. CMP shows Na+ 133 AG 7 glucose 112 Ca+ 8.4 T. Bili 1.7. . Initial Troponin 25.9. EKG shows sinus tachycardia; with possible left atrial enlargement. CXR shows right mid and lower lung pneumonia. On admit, temp 99.3 Hr 114 RR 16 /110 sats 95% on RA           Per ED provider, patient presented with SOB and productive cough "pink frothy sputum". CXR was completed and showed right mid/lower PNA. Started on IV ABX. However, BNP was elevated and given symptom, a bedside Echo was completed which per ED provider more consistent with HF with EF approx 50%. BP significantly elevated tridil gtt initiated, with good BP controlled, tridil gtt discontinued. Patient was also noted to have a decrease in sats to 91% on 5 liters O2, placed on Bipap with O2 sats improving, now currently on 2L o2 per NC with O2 sats 96% on assessment.         Review of patient's allergies indicates:  No Known Allergies    No past medical history on file.  No past surgical history on file.        REVIEW OF SYSTEMS  CONSTITUTIONAL: Negative for chills, fatigue and fever.   EYES: No double vision, No blurred vision  NEURO: No headaches, No dizziness  RESPIRATORY:  Positive for cough, shortness of breath-improving and negative wheezing.    CARDIOVASCULAR:  Positive for chest pain-improved. Negative for palpitations and leg swelling.   GI: Negative for abdominal pain, " No melena, diarrhea, nausea and vomiting.   : Negative for dysuria and frequency, Negative for hematuria  SKIN: Negative for bruising, Negative for edema or discoloration noted.   ENDOCRINE: Negative for polyphagia, Negative for heat intolerance, Negative for cold intolerance  PSYCHIATRIC: Negative for depression, Negative for anxiety, Negative for memory loss  MUSCULOSKELETAL: Negative for neck pain, Negative for muscle weakness, Negative for back pain     OBJECTIVE     VITAL SIGNS (Most Recent)  Temp: 97.6 °F (36.4 °C) (03/09/23 0744)  Pulse: 96 (03/09/23 0744)  Resp: 16 (03/09/23 0744)  BP: 132/82 (03/09/23 0744)  SpO2: 95 % (03/09/23 0744)    VENTILATION STATUS  Resp: 16 (03/09/23 0744)  SpO2: 95 % (03/09/23 0744)  Oxygen Concentration (%):  [50] 50    I & O (Last 24H):  Intake/Output Summary (Last 24 hours) at 3/9/2023 0845  Last data filed at 3/8/2023 2224  Gross per 24 hour   Intake 360 ml   Output 200 ml   Net 160 ml       WEIGHTS  Wt Readings from Last 3 Encounters:   03/09/23 0417 130.6 kg (287 lb 14.7 oz)   03/07/23 1458 (!) 138.3 kg (305 lb)   03/08/23 1353 (!) 138.3 kg (305 lb)       PHYSICAL EXAM  GENERAL: well built, obese male in no apparent distress alert and oriented.   HEENT: Normocephalic. Pupils normal and conjunctivae normal.    NECK: No JVD. No bruit..   THYROID: Thyroid not examined  CARDIAC: Regular rate and rhythm. S1 is normal.S2 is normal.No gallops, clicks or murmurs noted at this time.  CHEST ANATOMY: normal.   LUNGS: Clear to auscultation. No wheezing or rhonchi..   ABDOMEN: Soft. Normal bowel sounds.  Nontender  URINARY: No serna catheter   EXTREMITIES: No cyanosis, clubbing or edema noted at this time.  PERIPHERAL VASCULAR SYSTEM: Good palpable distal pulses.   CENTRAL NERVOUS SYSTEM: No focal motor or sensory deficits noted.   SKIN: Skin without lesions, moist, well perfused.   MUSCLE STRENGTH & TONE: No noteable weakness, atrophy or abnormal movement.     HOME MEDICATIONS:  No  current facility-administered medications on file prior to encounter.     Current Outpatient Medications on File Prior to Encounter   Medication Sig Dispense Refill    albuterol (PROVENTIL/VENTOLIN HFA) 90 mcg/actuation inhaler Inhale 2 puffs into the lungs every 4 (four) hours as needed.      losartan (COZAAR) 50 MG tablet Take 50 mg by mouth once daily.         SCHEDULED MEDS:   aspirin  324 mg Oral Daily    atorvastatin  20 mg Oral QHS    azithromycin  250 mg Oral Daily    benzonatate  100 mg Oral TID    calcium carbonate  500 mg Oral TID WM    enoxaparin  40 mg Subcutaneous Q12H    famotidine  20 mg Oral BID    furosemide (LASIX) injection  40 mg Intravenous BID AC    isosorbide mononitrate  30 mg Oral Daily    levalbuterol  0.63 mg Nebulization Q8H    losartan  50 mg Oral QHS    magnesium oxide  400 mg Oral BID AC    metoprolol tartrate  25 mg Oral BID    potassium chloride  20 mEq Oral BID AC    spironolactone  25 mg Oral Daily       CONTINUOUS INFUSIONS:    PRN MEDS:dextrose 10%, dextrose 10%, glucagon (human recombinant), glucose, glucose, hydrALAZINE, ibuprofen, magnesium oxide, magnesium oxide, melatonin, naloxone, potassium bicarbonate, potassium bicarbonate, potassium bicarbonate, potassium, sodium phosphates, potassium, sodium phosphates, potassium, sodium phosphates, sodium chloride 0.9%    LABS AND DIAGNOSTICS     CBC LAST 3 DAYS  Recent Labs   Lab 03/07/23  1528 03/08/23  0446 03/09/23  0407   WBC 12.05 12.88* 9.54   RBC 4.80 4.47* 4.65   HGB 12.6* 11.8* 12.2*   HCT 37.1* 34.0* 35.7*   MCV 77* 76* 77*   MCH 26.3* 26.4* 26.2*   MCHC 34.0 34.7 34.2   RDW 14.9* 14.9* 15.2*    297 265   MPV 11.7 11.7 12.0   GRAN 59.5  7.2 67.1  8.6* 53.0  5.1   LYMPH 31.9  3.8 24.0  3.1 35.5  3.4   MONO 7.2  0.9 7.6  1.0 8.7  0.8   BASO 0.06 0.04 0.05   NRBC 0 0 0       COAGULATION LAST 3 DAYS  No results for input(s): LABPT, INR, APTT in the last 168 hours.    CHEMISTRY LAST 3 DAYS  Recent Labs   Lab  03/07/23  1528 03/07/23  1734 03/08/23  0446 03/09/23  0407   *  --  138 136   K 3.5  --  3.3* 3.9     --  102 99   CO2 25  --  26 28   ANIONGAP 7*  --  10 9   BUN 15  --  16 23*   CREATININE 1.3  --  1.3 1.4   *  --  113* 117*   CALCIUM 8.4*  --  8.3* 8.5*   PH  --  7.393  --   --    MG 1.9  --  1.9 2.2   ALBUMIN 3.7  --  3.4* 3.3*   PROT 7.3  --  7.0 7.0   ALKPHOS 58  --  57 51*   ALT 27  --  27 29   AST 25  --  25 26   BILITOT 1.7*  --  1.5* 1.2*       CARDIAC PROFILE LAST 3 DAYS  Recent Labs   Lab 03/07/23  1528 03/07/23  1941 03/08/23  0446   *  --   --    *  --   --    TROPONINIHS 25.9* 25.3* 27.1*       ENDOCRINE LAST 3 DAYS  Recent Labs   Lab 03/07/23  1941 03/08/23  0446   TSH  --  1.300   PROCAL 0.05  --        LAST ARTERIAL BLOOD GAS  ABG  Recent Labs   Lab 03/07/23  1734   PH 7.393   PO2 50*   PCO2 39.8   HCO3 24.3   BE -1       LAST 7 DAYS MICROBIOLOGY   Microbiology Results (last 7 days)       ** No results found for the last 168 hours. **            MOST RECENT IMAGING  Echo  Addendum: · The left ventricle is normal in size with mild eccentric hypertrophy and  severely decreased systolic function.   · The estimated ejection fraction is 25%.   · Grade III left ventricular diastolic dysfunction.   · Normal right ventricular size with normal right ventricular systolic  function.   · Mild right atrial enlargement.   · Moderate mitral regurgitation.   · Mild to moderate tricuspid regurgitation.   · Mild pulmonic regurgitation.   · Moderate left atrial enlargement.  Narrative: · The left ventricle is normal in size with mild eccentric hypertrophy and   mildly decreased systolic function.  · The estimated ejection fraction is 45%.  · Grade III left ventricular diastolic dysfunction.  · Normal right ventricular size with normal right ventricular systolic   function.  · Moderate left atrial enlargement.  · Mild right atrial enlargement.  · Moderate mitral regurgitation.  · Mild  to moderate tricuspid regurgitation.  · Mild pulmonic regurgitation.     CTA Chest Non-Coronary (PE Studies)  CMS MANDATED QUALITY DATA-CT RADIATION DOSE-436  All CT scans at this facility use dose modulation, iterative reconstruction, and or weight-based dosing when appropriate to reduce radiation dose to as low as reasonably achievable.    HISTORY: Pulmonary embolism (PE) suspected, unknown D-dimer    FINDINGS: Thin axial imaging through the chest was performed with 100 mL Omnipaque 350 IV contrast, with sagittal and coronal reformatted images and MIP reconstructions performed, and images stored in the patient's permanent electronic medical record.    Comparison to the chest radiograph of the prior day. There are no pulmonary arterial filling defects to suggest pulmonary thromboembolism. The central pulmonary arteries are normal in caliber.    The aorta is normal in caliber and tapers appropriately. The heart is enlarged, with dilated left ventricle. There is no pericardial effusion, with no enlarged mediastinal or hilar lymph nodes.    There are patchy airspace opacities and groundglass densities in both lungs, most pronounced in the right lower lobe with some air bronchograms, suspicious for multifocal pneumonia. There is no evidence of interstitial pulmonary edema, with no pleural effusion. The central airways are patent, with no pneumothorax.    Images of the upper abdomen are unremarkable. There are no acute fractures or destructive osseous lesions.    IMPRESSION:  1. Negative for pulmonary thromboembolism.  2. Scattered interstitial and airspace opacities in both lungs, suspicious for multifocal pneumonia. Viral or atypical pneumonia could have this appearance.  3. Cardiomegaly with dilated left ventricle.    Electronically signed by:  Darrin Roland MD  3/8/2023 11:38 AM CST Workstation: [id number]      ECHOCARDIOGRAM RESULTS (last 5)  Results for orders placed during the hospital encounter of  03/07/23    Echo    Interpretation Summary  · The left ventricle is normal in size with mild eccentric hypertrophy and severely decreased systolic function.  · The estimated ejection fraction is 25%.  · Grade III left ventricular diastolic dysfunction.  · Normal right ventricular size with normal right ventricular systolic function.  · Mild right atrial enlargement.  · Moderate mitral regurgitation.  · Mild to moderate tricuspid regurgitation.  · Mild pulmonic regurgitation.  · Moderate left atrial enlargement.      CURRENT/PREVIOUS VISIT EKG  Results for orders placed or performed during the hospital encounter of 03/07/23   EKG 12-lead    Collection Time: 03/08/23  7:46 AM    Narrative    Test Reason : R94.31,    Vent. Rate : 104 BPM     Atrial Rate : 104 BPM     P-R Int : 162 ms          QRS Dur : 088 ms      QT Int : 356 ms       P-R-T Axes : 048 026 066 degrees     QTc Int : 468 ms    Sinus tachycardia  Possible Left atrial enlargement  Nonspecific T wave abnormality  Abnormal ECG  When compared with ECG of 07-MAR-2023 15:08,  Nonspecific T wave abnormality now evident in Inferior leads    Referred By: [email or web address]   SELF           Confirmed By:            ASSESSMENT/PLAN:     Active Hospital Problems    Diagnosis    *Hypertensive urgency    Acute pulmonary edema    Right middle/lower lobe pneumonia    Primary hypertension    Elevated troponin    Hyponatremia    Acute respiratory failure with hypoxia       ASSESSMENT & PLAN:     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  Echocardiogram this admission shows EF of 25% with grade 3 diastolic dysfunction as well as moderate MR/TR.  .  Continue Lasix 40 mg b.i.d..  Continue  spironolactone 25 mg daily.  Strict I&Os.  1.5 L fluid restriction.  Low-sodium heart healthy diet.  Patient also presented with hypertensive emergency.  Blood pressure now controlled.  Continue metoprolol tartrate 25 mg b.i.d., losartan 50 mg daily, isosorbide mononitrate 30 mg daily, and spironolactone 25 mg daily.  Hold antihypertensives for systolic BP less than 100.  Mildly elevated troponin HS likely secondary to demand ischemia from acute respiratory failure as well as new onset systolic and diastolic heart failure.  However due to new onset of acute combined systolic and diastolic heart failure, recommend angiogram next a.m. to rule out coronary blockages.  Discussed with patient the risks and benefits of the procedure including, but not limited to, the following 1:1000 risk of Heart attack, stroke and death with 3-5% Risk of bleeding, vessel damage, and the need for emergent CABG Surgery. All questions have been answered to patient's satisfaction.  Informed consent obtained. Will keep him nothing by mouth post midnight and proceed with the coronary angiography possible PCI in the morning.   Thank you for the consultation.  We will continue to follow.      Aliyah Smith NP  [facility]  Department of Cardiology  Date of Service: 03/09/2023      I have personally interviewed and examined the patient, I have reviewed the Nurse Practitioner's history and physical, assessment, and plan. I have personally evaluated the patient at bedside and agree with the findings and made appropriate changes as necessary in recommendations.  Pt has chronically worsening SOB.  BP not well controlled per pt. presented with severe cardiomyopathy, heart failure with reduced ejection fraction.  NPO after midnight for cardiac catheterization in a.m. to rule out CAD.  Continue medical therapy for heart failure.     Barrett Fisher MD  Department of Cardiology  [facility]  3/9/23

## 2023-03-10 PROBLEM — J18.9 PNEUMONIA: Status: ACTIVE | Noted: 2023-03-10

## 2023-03-10 PROBLEM — E87.1 HYPONATREMIA: Status: RESOLVED | Noted: 2023-03-07 | Resolved: 2023-03-10

## 2023-03-10 LAB
ALBUMIN SERPL BCP-MCNC: 3.4 G/DL (ref 3.5–5.2)
ALP SERPL-CCNC: 59 U/L (ref 55–135)
ALT SERPL W/O P-5'-P-CCNC: 24 U/L (ref 10–44)
ANION GAP SERPL CALC-SCNC: 11 MMOL/L (ref 8–16)
ANION GAP SERPL CALC-SCNC: 9 MMOL/L (ref 8–16)
AST SERPL-CCNC: 18 U/L (ref 10–40)
BASOPHILS # BLD AUTO: 0.06 K/UL (ref 0–0.2)
BASOPHILS NFR BLD: 0.8 % (ref 0–1.9)
BILIRUB SERPL-MCNC: 0.7 MG/DL (ref 0.1–1)
BNP SERPL-MCNC: 135 PG/ML (ref 0–99)
BUN SERPL-MCNC: 22 MG/DL (ref 6–20)
BUN SERPL-MCNC: 23 MG/DL (ref 6–20)
CALCIUM SERPL-MCNC: 8.8 MG/DL (ref 8.7–10.5)
CALCIUM SERPL-MCNC: 8.9 MG/DL (ref 8.7–10.5)
CHLORIDE SERPL-SCNC: 101 MMOL/L (ref 95–110)
CHLORIDE SERPL-SCNC: 99 MMOL/L (ref 95–110)
CO2 SERPL-SCNC: 28 MMOL/L (ref 23–29)
CO2 SERPL-SCNC: 29 MMOL/L (ref 23–29)
CREAT SERPL-MCNC: 1.4 MG/DL (ref 0.5–1.4)
CREAT SERPL-MCNC: 1.5 MG/DL (ref 0.5–1.4)
DIFFERENTIAL METHOD: ABNORMAL
EOSINOPHIL # BLD AUTO: 0.2 K/UL (ref 0–0.5)
EOSINOPHIL NFR BLD: 2.3 % (ref 0–8)
ERYTHROCYTE [DISTWIDTH] IN BLOOD BY AUTOMATED COUNT: 15.1 % (ref 11.5–14.5)
EST. GFR  (NO RACE VARIABLE): >60 ML/MIN/1.73 M^2
EST. GFR  (NO RACE VARIABLE): >60 ML/MIN/1.73 M^2
GLUCOSE SERPL-MCNC: 119 MG/DL (ref 70–110)
GLUCOSE SERPL-MCNC: 121 MG/DL (ref 70–110)
HCT VFR BLD AUTO: 36.8 % (ref 40–54)
HGB BLD-MCNC: 12.5 G/DL (ref 14–18)
IMM GRANULOCYTES # BLD AUTO: 0.02 K/UL (ref 0–0.04)
IMM GRANULOCYTES NFR BLD AUTO: 0.3 % (ref 0–0.5)
LYMPHOCYTES # BLD AUTO: 2.9 K/UL (ref 1–4.8)
LYMPHOCYTES NFR BLD: 37.2 % (ref 18–48)
MAGNESIUM SERPL-MCNC: 2.3 MG/DL (ref 1.6–2.6)
MCH RBC QN AUTO: 26.2 PG (ref 27–31)
MCHC RBC AUTO-ENTMCNC: 34 G/DL (ref 32–36)
MCV RBC AUTO: 77 FL (ref 82–98)
MONOCYTES # BLD AUTO: 0.7 K/UL (ref 0.3–1)
MONOCYTES NFR BLD: 9.4 % (ref 4–15)
NEUTROPHILS # BLD AUTO: 4 K/UL (ref 1.8–7.7)
NEUTROPHILS NFR BLD: 50 % (ref 38–73)
NRBC BLD-RTO: 0 /100 WBC
PHOSPHATE SERPL-MCNC: 5.7 MG/DL (ref 2.7–4.5)
PLATELET # BLD AUTO: 345 K/UL (ref 150–450)
PMV BLD AUTO: 11.3 FL (ref 9.2–12.9)
POTASSIUM SERPL-SCNC: 4 MMOL/L (ref 3.5–5.1)
POTASSIUM SERPL-SCNC: 4 MMOL/L (ref 3.5–5.1)
PROT SERPL-MCNC: 7.1 G/DL (ref 6–8.4)
RBC # BLD AUTO: 4.77 M/UL (ref 4.6–6.2)
SODIUM SERPL-SCNC: 138 MMOL/L (ref 136–145)
SODIUM SERPL-SCNC: 139 MMOL/L (ref 136–145)
WBC # BLD AUTO: 7.88 K/UL (ref 3.9–12.7)

## 2023-03-10 PROCEDURE — 99233 PR SUBSEQUENT HOSPITAL CARE,LEVL III: ICD-10-PCS | Mod: ,,, | Performed by: INTERNAL MEDICINE

## 2023-03-10 PROCEDURE — 85025 COMPLETE CBC W/AUTO DIFF WBC: CPT | Performed by: NURSE PRACTITIONER

## 2023-03-10 PROCEDURE — 63600175 PHARM REV CODE 636 W HCPCS: Performed by: FAMILY MEDICINE

## 2023-03-10 PROCEDURE — 25000003 PHARM REV CODE 250: Performed by: STUDENT IN AN ORGANIZED HEALTH CARE EDUCATION/TRAINING PROGRAM

## 2023-03-10 PROCEDURE — 63700000 PHARM REV CODE 250 ALT 637 W/O HCPCS: Performed by: HOSPITALIST

## 2023-03-10 PROCEDURE — 25000003 PHARM REV CODE 250: Performed by: HOSPITALIST

## 2023-03-10 PROCEDURE — 25000003 PHARM REV CODE 250: Performed by: NURSE PRACTITIONER

## 2023-03-10 PROCEDURE — 25000242 PHARM REV CODE 250 ALT 637 W/ HCPCS: Performed by: NURSE PRACTITIONER

## 2023-03-10 PROCEDURE — 83735 ASSAY OF MAGNESIUM: CPT | Performed by: NURSE PRACTITIONER

## 2023-03-10 PROCEDURE — 94640 AIRWAY INHALATION TREATMENT: CPT

## 2023-03-10 PROCEDURE — 80048 BASIC METABOLIC PNL TOTAL CA: CPT

## 2023-03-10 PROCEDURE — 83880 ASSAY OF NATRIURETIC PEPTIDE: CPT | Performed by: HOSPITALIST

## 2023-03-10 PROCEDURE — 99900035 HC TECH TIME PER 15 MIN (STAT)

## 2023-03-10 PROCEDURE — 36415 COLL VENOUS BLD VENIPUNCTURE: CPT

## 2023-03-10 PROCEDURE — 99233 SBSQ HOSP IP/OBS HIGH 50: CPT | Mod: ,,, | Performed by: INTERNAL MEDICINE

## 2023-03-10 PROCEDURE — 21400001 HC TELEMETRY ROOM

## 2023-03-10 PROCEDURE — 94761 N-INVAS EAR/PLS OXIMETRY MLT: CPT

## 2023-03-10 PROCEDURE — 63600175 PHARM REV CODE 636 W HCPCS: Performed by: HOSPITALIST

## 2023-03-10 PROCEDURE — 94660 CPAP INITIATION&MGMT: CPT

## 2023-03-10 PROCEDURE — 84100 ASSAY OF PHOSPHORUS: CPT | Performed by: NURSE PRACTITIONER

## 2023-03-10 PROCEDURE — 80053 COMPREHEN METABOLIC PANEL: CPT | Performed by: NURSE PRACTITIONER

## 2023-03-10 RX ORDER — SODIUM CHLORIDE 9 MG/ML
INJECTION, SOLUTION INTRAVENOUS CONTINUOUS
Status: DISCONTINUED | OUTPATIENT
Start: 2023-03-10 | End: 2023-03-11

## 2023-03-10 RX ADMIN — SPIRONOLACTONE 25 MG: 25 TABLET, FILM COATED ORAL at 10:03

## 2023-03-10 RX ADMIN — SODIUM CHLORIDE: 0.9 INJECTION, SOLUTION INTRAVENOUS at 02:03

## 2023-03-10 RX ADMIN — ENOXAPARIN SODIUM 40 MG: 100 INJECTION SUBCUTANEOUS at 11:03

## 2023-03-10 RX ADMIN — MAGNESIUM OXIDE 400 MG (241.3 MG MAGNESIUM) TABLET 400 MG: at 05:03

## 2023-03-10 RX ADMIN — FAMOTIDINE 20 MG: 20 TABLET ORAL at 08:03

## 2023-03-10 RX ADMIN — POTASSIUM CHLORIDE 20 MEQ: 1500 TABLET, EXTENDED RELEASE ORAL at 04:03

## 2023-03-10 RX ADMIN — BENZONATATE 100 MG: 100 CAPSULE ORAL at 08:03

## 2023-03-10 RX ADMIN — ENOXAPARIN SODIUM 40 MG: 100 INJECTION SUBCUTANEOUS at 08:03

## 2023-03-10 RX ADMIN — METOPROLOL TARTRATE 25 MG: 25 TABLET, FILM COATED ORAL at 08:03

## 2023-03-10 RX ADMIN — ASPIRIN 81 MG CHEWABLE TABLET 324 MG: 81 TABLET CHEWABLE at 10:03

## 2023-03-10 RX ADMIN — ISOSORBIDE MONONITRATE 30 MG: 30 TABLET, EXTENDED RELEASE ORAL at 10:03

## 2023-03-10 RX ADMIN — LOSARTAN POTASSIUM 50 MG: 50 TABLET, FILM COATED ORAL at 08:03

## 2023-03-10 RX ADMIN — CALCIUM CARBONATE (ANTACID) CHEW TAB 500 MG 500 MG: 500 CHEW TAB at 08:03

## 2023-03-10 RX ADMIN — FUROSEMIDE 40 MG: 10 INJECTION, SOLUTION INTRAMUSCULAR; INTRAVENOUS at 11:03

## 2023-03-10 RX ADMIN — LEVALBUTEROL HYDROCHLORIDE 0.63 MG: 0.63 SOLUTION RESPIRATORY (INHALATION) at 07:03

## 2023-03-10 RX ADMIN — CALCIUM CARBONATE (ANTACID) CHEW TAB 500 MG 500 MG: 500 CHEW TAB at 04:03

## 2023-03-10 RX ADMIN — METOPROLOL TARTRATE 25 MG: 25 TABLET, FILM COATED ORAL at 10:03

## 2023-03-10 RX ADMIN — CALCIUM CARBONATE (ANTACID) CHEW TAB 500 MG 500 MG: 500 CHEW TAB at 12:03

## 2023-03-10 RX ADMIN — AZITHROMYCIN MONOHYDRATE 250 MG: 250 TABLET ORAL at 10:03

## 2023-03-10 RX ADMIN — ATORVASTATIN CALCIUM 20 MG: 20 TABLET, FILM COATED ORAL at 08:03

## 2023-03-10 RX ADMIN — FAMOTIDINE 20 MG: 20 TABLET ORAL at 10:03

## 2023-03-10 RX ADMIN — MAGNESIUM OXIDE 400 MG (241.3 MG MAGNESIUM) TABLET 400 MG: at 04:03

## 2023-03-10 RX ADMIN — POTASSIUM CHLORIDE 20 MEQ: 1500 TABLET, EXTENDED RELEASE ORAL at 05:03

## 2023-03-10 NOTE — PROGRESS NOTES
[facility]  Department of Cardiology  Consult Note      PATIENT NAME: Wenceslao Jeong  MRN: [id number]  TODAY'S DATE: 03/10/2023  ADMIT DATE: 3/7/2023                          CONSULT REQUESTED BY: Nestor Mosley MD    SUBJECTIVE     PRINCIPAL PROBLEM: Acute combined systolic and diastolic congestive heart failure      REASON FOR CONSULT:    Newly diagnosed systolic HF      INTERVAL HISTORY:    Patient is resting comfortably during examination.  No acute distress.  Alert and oriented.  Vital signs stable.  Patient was supposed to have angiogram this morning.  However, creatinine increased to 1.5 overnight.  At this time we will postpone angiogram to Monday morning in view of LISA.      HPI:     Patient is a 27-year-old male with past medical history of hypertension who presented to the ED with acute onset of chest pain and shortness of breath with exertion, alleviated with rest for the past 3 weeks, worsening over the past 2-3 days.  Patient also reports a cough with pink foamy sputum.  Patient was recently started on losartan 50 mg daily on February 1, 2023 by his PCP.  Patient denies fever, chills, diaphoresis, lightheadedness, dizziness, palpitations, edema or bleeding.  Chest x-ray shows right middle and lower lung pneumonia.  CT of the chest also shows multifocal pneumonia.  Blood pressure significantly elevated in ED and patient was started on a Tridil drip.  , echocardiogram performed with EF of 25% and grade 3 diastolic dysfunction, moderate MR/TR.  Patient was also found to be hypoxic and placed on 5 L and subsequently placed on BiPAP for worsening dyspnea.    During examination, patient is alert and oriented, on room air, no acute distress, sinus rhythm on telemetry.  Patient states he is feeling much better and denies active chest pain or shortness of breath.      FROM H&P:  HPI: Wenceslao Jeong is a 27 y.o. male with a history as  has no past medical history on file. who presented  "to the ED with a Chest Pain (Acute onset of chest pain during exertion. Allevaited with rest. Pt reports hx of MEJIA. Hx of HTN. )     Patient presents to the ED with a c/o SOB for about 3 weeks worsening on last 2-3 days and now with cough with pink foamy sputum. Patient reports he was seen by his PCP because he had been SOB and was told he did not have pneumonia, however was started on BP medications (losartan 50 mg daily) February 1, 2023.  Patient further states he was supposed to follow up with PCP to see if the losartan was working but he missed the appointment because his job was short and he had to fill in.      Denies fever, chills, diaphoresis, dizziness, HA, chest pain, palpitations, NVD, recent trauma or any other associated symptoms. No aggravating and alleviating factor.  Does not smoke cigarettes, drink or do illegal drugs.      Lab and imaging obtained and reviewed. CBC shows H/H 12.6/37.1 MCV 77 MCH 26.3 RDW 14.9. CMP shows Na+ 133 AG 7 glucose 112 Ca+ 8.4 T. Bili 1.7. . Initial Troponin 25.9. EKG shows sinus tachycardia; with possible left atrial enlargement. CXR shows right mid and lower lung pneumonia. On admit, temp 99.3 Hr 114 RR 16 /110 sats 95% on RA           Per ED provider, patient presented with SOB and productive cough "pink frothy sputum". CXR was completed and showed right mid/lower PNA. Started on IV ABX. However, BNP was elevated and given symptom, a bedside Echo was completed which per ED provider more consistent with HF with EF approx 50%. BP significantly elevated tridil gtt initiated, with good BP controlled, tridil gtt discontinued. Patient was also noted to have a decrease in sats to 91% on 5 liters O2, placed on Bipap with O2 sats improving, now currently on 2L o2 per NC with O2 sats 96% on assessment.         Review of patient's allergies indicates:  No Known Allergies    No past medical history on file.  No past surgical history on file.        REVIEW OF " SYSTEMS  CONSTITUTIONAL: Negative for chills, fatigue and fever.   EYES: No double vision, No blurred vision  NEURO: No headaches, No dizziness  RESPIRATORY:  Positive for cough, shortness of breath-improving and negative wheezing.    CARDIOVASCULAR:  Positive for chest pain-improved. Negative for palpitations and leg swelling.   GI: Negative for abdominal pain, No melena, diarrhea, nausea and vomiting.   : Negative for dysuria and frequency, Negative for hematuria  SKIN: Negative for bruising, Negative for edema or discoloration noted.   ENDOCRINE: Negative for polyphagia, Negative for heat intolerance, Negative for cold intolerance  PSYCHIATRIC: Negative for depression, Negative for anxiety, Negative for memory loss  MUSCULOSKELETAL: Negative for neck pain, Negative for muscle weakness, Negative for back pain     OBJECTIVE     VITAL SIGNS (Most Recent)  Temp: 97.8 °F (36.6 °C) (03/10/23 1207)  Pulse: 91 (03/10/23 1207)  Resp: 18 (03/10/23 1207)  BP: (!) 115/56 (03/10/23 1207)  SpO2: 95 % (03/10/23 1207)    VENTILATION STATUS  Resp: 18 (03/10/23 1207)  SpO2: 95 % (03/10/23 1207)  Oxygen Concentration (%):  [0.21] 0.21    I & O (Last 24H):  Intake/Output Summary (Last 24 hours) at 3/10/2023 1409  Last data filed at 3/10/2023 1030  Gross per 24 hour   Intake 730 ml   Output 600 ml   Net 130 ml       WEIGHTS  Wt Readings from Last 3 Encounters:   03/10/23 0555 129.9 kg (286 lb 6 oz)   03/09/23 0417 130.6 kg (287 lb 14.7 oz)   03/07/23 1458 (!) 138.3 kg (305 lb)   03/08/23 1353 (!) 138.3 kg (305 lb)       PHYSICAL EXAM  GENERAL: well built, obese male in no apparent distress alert and oriented.   HEENT: Normocephalic. Pupils normal and conjunctivae normal.    NECK: No JVD. No bruit..   THYROID: Thyroid not examined  CARDIAC: Regular rate and rhythm. S1 is normal.S2 is normal.No gallops, clicks or murmurs noted at this time.  CHEST ANATOMY: normal.   LUNGS: Clear to auscultation. No wheezing or rhonchi..   ABDOMEN:  Soft. Normal bowel sounds.  Nontender  URINARY: No serna catheter   EXTREMITIES: No cyanosis, clubbing or edema noted at this time.  PERIPHERAL VASCULAR SYSTEM: Good palpable distal pulses.   CENTRAL NERVOUS SYSTEM: No focal motor or sensory deficits noted.   SKIN: Skin without lesions, moist, well perfused.   MUSCLE STRENGTH & TONE: No noteable weakness, atrophy or abnormal movement.     HOME MEDICATIONS:  No current facility-administered medications on file prior to encounter.     Current Outpatient Medications on File Prior to Encounter   Medication Sig Dispense Refill    albuterol (PROVENTIL/VENTOLIN HFA) 90 mcg/actuation inhaler Inhale 2 puffs into the lungs every 4 (four) hours as needed.      losartan (COZAAR) 50 MG tablet Take 50 mg by mouth once daily.         SCHEDULED MEDS:   aspirin  324 mg Oral Daily    atorvastatin  20 mg Oral QHS    azithromycin  250 mg Oral Daily    benzonatate  100 mg Oral TID    calcium carbonate  500 mg Oral TID WM    enoxaparin  40 mg Subcutaneous Q12H    famotidine  20 mg Oral BID    isosorbide mononitrate  30 mg Oral Daily    levalbuterol  0.63 mg Nebulization Q8H    losartan  50 mg Oral QHS    magnesium oxide  400 mg Oral BID AC    metoprolol tartrate  25 mg Oral BID    potassium chloride  20 mEq Oral BID AC    spironolactone  25 mg Oral Daily       CONTINUOUS INFUSIONS:   sodium chloride 0.9%         PRN MEDS:dextrose 10%, dextrose 10%, glucagon (human recombinant), glucose, glucose, hydrALAZINE, ibuprofen, magnesium oxide, magnesium oxide, melatonin, naloxone, potassium bicarbonate, potassium bicarbonate, potassium bicarbonate, potassium, sodium phosphates, potassium, sodium phosphates, potassium, sodium phosphates, sodium chloride 0.9%, sodium chloride 0.9%    LABS AND DIAGNOSTICS     CBC LAST 3 DAYS  Recent Labs   Lab 03/08/23  0446 03/09/23  0407 03/10/23  0427   WBC 12.88* 9.54 7.88   RBC 4.47* 4.65 4.77   HGB 11.8* 12.2* 12.5*   HCT 34.0* 35.7* 36.8*   MCV 76* 77* 77*    MCH 26.4* 26.2* 26.2*   MCHC 34.7 34.2 34.0   RDW 14.9* 15.2* 15.1*    265 345   MPV 11.7 12.0 11.3   GRAN 67.1  8.6* 53.0  5.1 50.0  4.0   LYMPH 24.0  3.1 35.5  3.4 37.2  2.9   MONO 7.6  1.0 8.7  0.8 9.4  0.7   BASO 0.04 0.05 0.06   NRBC 0 0 0       COAGULATION LAST 3 DAYS  No results for input(s): LABPT, INR, APTT in the last 168 hours.    CHEMISTRY LAST 3 DAYS  Recent Labs   Lab 03/07/23  1734 03/08/23  0446 03/09/23  0407 03/10/23  0427   NA  --  138 136 139  138   K  --  3.3* 3.9 4.0  4.0   CL  --  102 99 101  99   CO2  --  26 28 29  28   ANIONGAP  --  10 9 9  11   BUN  --  16 23* 23*  22*   CREATININE  --  1.3 1.4 1.4  1.5*   GLU  --  113* 117* 119*  121*   CALCIUM  --  8.3* 8.5* 8.9  8.8   PH 7.393  --   --   --    MG  --  1.9 2.2 2.3   ALBUMIN  --  3.4* 3.3* 3.4*   PROT  --  7.0 7.0 7.1   ALKPHOS  --  57 51* 59   ALT  --  27 29 24   AST  --  25 26 18   BILITOT  --  1.5* 1.2* 0.7       CARDIAC PROFILE LAST 3 DAYS  Recent Labs   Lab 03/07/23  1528 03/07/23  1941 03/08/23  0446 03/10/23  0427   *  --   --  135*   *  --   --   --    TROPONINIHS 25.9* 25.3* 27.1*  --        ENDOCRINE LAST 3 DAYS  Recent Labs   Lab 03/07/23 1941 03/08/23 0446   TSH  --  1.300   PROCAL 0.05  --        LAST ARTERIAL BLOOD GAS  ABG  Recent Labs   Lab 03/07/23 1734   PH 7.393   PO2 50*   PCO2 39.8   HCO3 24.3   BE -1       LAST 7 DAYS MICROBIOLOGY   Microbiology Results (last 7 days)       ** No results found for the last 168 hours. **            MOST RECENT IMAGING  Echo  Addendum: · The left ventricle is normal in size with mild eccentric hypertrophy and  severely decreased systolic function.   · The estimated ejection fraction is 25%.   · Grade III left ventricular diastolic dysfunction.   · Normal right ventricular size with normal right ventricular systolic  function.   · Mild right atrial enlargement.   · Moderate mitral regurgitation.   · Mild to moderate tricuspid regurgitation.   ·  Mild pulmonic regurgitation.   · Moderate left atrial enlargement.  Narrative: · The left ventricle is normal in size with mild eccentric hypertrophy and   mildly decreased systolic function.  · The estimated ejection fraction is 45%.  · Grade III left ventricular diastolic dysfunction.  · Normal right ventricular size with normal right ventricular systolic   function.  · Moderate left atrial enlargement.  · Mild right atrial enlargement.  · Moderate mitral regurgitation.  · Mild to moderate tricuspid regurgitation.  · Mild pulmonic regurgitation.     CTA Chest Non-Coronary (PE Studies)  CMS MANDATED QUALITY DATA-CT RADIATION DOSE-436  All CT scans at this facility use dose modulation, iterative reconstruction, and or weight-based dosing when appropriate to reduce radiation dose to as low as reasonably achievable.    HISTORY: Pulmonary embolism (PE) suspected, unknown D-dimer    FINDINGS: Thin axial imaging through the chest was performed with 100 mL Omnipaque 350 IV contrast, with sagittal and coronal reformatted images and MIP reconstructions performed, and images stored in the patient's permanent electronic medical record.    Comparison to the chest radiograph of the prior day. There are no pulmonary arterial filling defects to suggest pulmonary thromboembolism. The central pulmonary arteries are normal in caliber.    The aorta is normal in caliber and tapers appropriately. The heart is enlarged, with dilated left ventricle. There is no pericardial effusion, with no enlarged mediastinal or hilar lymph nodes.    There are patchy airspace opacities and groundglass densities in both lungs, most pronounced in the right lower lobe with some air bronchograms, suspicious for multifocal pneumonia. There is no evidence of interstitial pulmonary edema, with no pleural effusion. The central airways are patent, with no pneumothorax.    Images of the upper abdomen are unremarkable. There are no acute fractures or destructive  osseous lesions.    IMPRESSION:  1. Negative for pulmonary thromboembolism.  2. Scattered interstitial and airspace opacities in both lungs, suspicious for multifocal pneumonia. Viral or atypical pneumonia could have this appearance.  3. Cardiomegaly with dilated left ventricle.    Electronically signed by:  Darrin Roland MD  3/8/2023 11:38 AM [facility] Workstation: [id number]      ECHOCARDIOGRAM RESULTS (last 5)  Results for orders placed during the hospital encounter of 03/07/23    Echo    Interpretation Summary  · The left ventricle is normal in size with mild eccentric hypertrophy and severely decreased systolic function.  · The estimated ejection fraction is 25%.  · Grade III left ventricular diastolic dysfunction.  · Normal right ventricular size with normal right ventricular systolic function.  · Mild right atrial enlargement.  · Moderate mitral regurgitation.  · Mild to moderate tricuspid regurgitation.  · Mild pulmonic regurgitation.  · Moderate left atrial enlargement.      CURRENT/PREVIOUS VISIT EKG  Results for orders placed or performed during the hospital encounter of 03/07/23   EKG 12-lead    Collection Time: 03/08/23  7:46 AM    Narrative    Test Reason : R94.31,    Vent. Rate : 104 BPM     Atrial Rate : 104 BPM     P-R Int : 162 ms          QRS Dur : 088 ms      QT Int : 356 ms       P-R-T Axes : 048 026 066 degrees     QTc Int : 468 ms    Sinus tachycardia  Possible Left atrial enlargement  Nonspecific T wave abnormality  Abnormal ECG  When compared with ECG of 07-MAR-2023 15:08,  Nonspecific T wave abnormality now evident in Inferior leads    Referred By: [email or web address]   SELF           Confirmed By:            ASSESSMENT/PLAN:     Active Hospital Problems    Diagnosis    *Acute combined systolic and diastolic congestive heart failure    Acute pulmonary edema    Primary hypertension    Hyponatremia       ASSESSMENT & PLAN:     New onset combined systolic and diastolic heart failure  Acute pulmonary  edema  Hypertensive urgency  Acute respiratory failure with hypoxia  Right middle/lower lobe pneumonia  Elevated troponin    RECOMMENDATIONS:    Patient presented with hypertensive emergency and new onset combined systolic and diastolic heart failure- EF of 25% with grade 3 diastolic dysfunction as well as moderate MR/TR.  Strict I&Os.  1.5 L fluid restriction.  Low-sodium heart healthy diet.  Patient also presented with hypertensive emergency.  Blood pressure now controlled.  Continue metoprolol tartrate 25 mg b.i.d., losartan 50 mg daily, isosorbide mononitrate 30 mg daily, and spironolactone 25 mg daily.  Hold antihypertensives for systolic BP less than 100.  Mildly elevated troponin HS likely secondary to demand ischemia from acute respiratory failure as well as new onset systolic and diastolic heart failure.    Angiogram postponed til Monday in view of increase in Creatinine this am.  Hold Lasix.  Serial BMP. Thank you for the consultation.  We will continue to follow.      Aliyah Smith NP  [facility]  Department of Cardiology  Date of Service: 03/10/2023      I have personally interviewed and examined the patient, I have reviewed the Nurse Practitioner's history and physical, assessment, and plan. I have personally evaluated the patient at bedside and agree with the findings and made appropriate changes as necessary in recommendations.    Barrett Fisher MD  Department of Cardiology  [facility]  3/10/23

## 2023-03-10 NOTE — CONSULTS
[facility]  Adult Nutrition   Consult Note (Nutrition Education)     SUMMARY     Recommendations  Recommendation/Intervention: 1. Continue current diet and encourage adequate intake. 2. RD provided therapeutic diet education to patient. Recommend RD review with patient Guardians when available. (Should here Monday, 3/13 per RN)  Goals: 1. Patient to meet at least 75% of estimated energy and protein needs. 2. Patient to express understanding of diet recommendations and lifestyle changes. RD to answer diet related questions should they arise.  Nutrition Goal Status: new  Communication of RD Recs: reviewed with RN    Dietitian Rounds Brief  Patient assessed 2' consult and patient was NPO for 3 days. Now on cardiac diet. Hypertensive urgency and Newly diagnosed CHF.   RD educated patient on diet for management of heart failure. Reviewed and provided written heart failure nutrition therapy education, heart healthy label reading tips, salt substitutes and salt free seasonings handout, and heart failure and your weight daily log. Provided RD contact information should questions arise. - Per RN patient's Guardians help with meals and patient has a job that he is in his car a lot. His guardians are a big support system and will need review or diet education when they are available. They left for the day but will be back Monday. RD wrote for RD to follow up with guardians for further diet education in handoff note.   PO intake good, 100% of meals.  LBM: 03/08/23    Reason for Assessment  Reason For Assessment: consult  Diagnosis: cardiac disease  Relevant Medical History: Acute pulmonary edema;  Primary hypertension;  Hyponatremia;  Acute combined systolic and diastolic congestive heart failure    Nutrition Risk Screen  Nutrition Risk Screen: no indicators present     MST Score: 0  Have you recently lost weight without trying?: No  Weight loss score: 0  Have you been eating poorly because of a decreased  "appetite?: No  Appetite score: 0       Nutrition/Diet History  Patient Reported Diet/Restrictions/Preferences: general  Food Allergies: NKFA  Factors Affecting Nutritional Intake: None identified at this time    Anthropometrics  Temp: 97.8 °F (36.6 °C)  Height Method: Stated  Height: 5' 8" (172.7 cm)  Height (inches): 68 in  Weight Method: (P) Standard Scale  Weight: 129.9 kg (286 lb 6 oz)  Weight (lb): 286.38 lb  Ideal Body Weight (IBW), Male: 154 lb  BMI (Calculated): 43.6  BMI Grade: greater than 40 - morbid obesity       Weight History:  Wt Readings from Last 10 Encounters:   03/10/23 129.9 kg (286 lb 6 oz)   03/08/23 (!) 138.3 kg (305 lb)       Lab/Procedures/Meds: Pertinent Labs Reviewed    Clinical Chemistry:  Recent Labs   Lab 03/08/23 0446 03/09/23  0407 03/10/23  0427    136 139  138   K 3.3* 3.9 4.0  4.0    99 101  99   CO2 26 28 29  28   * 117* 119*  121*   BUN 16 23* 23*  22*   CREATININE 1.3 1.4 1.4  1.5*   CALCIUM 8.3* 8.5* 8.9  8.8   PROT 7.0 7.0 7.1   ALBUMIN 3.4* 3.3* 3.4*   BILITOT 1.5* 1.2* 0.7   ALKPHOS 57 51* 59   AST 25 26 18   ALT 27 29 24   ANIONGAP 10 9 9  11   MG 1.9 2.2 2.3   PHOS 3.9 4.8* 5.7*       CBC:   Recent Labs   Lab 03/10/23  0427   WBC 7.88   RBC 4.77   HGB 12.5*   HCT 36.8*      MCV 77*   MCH 26.2*   MCHC 34.0       Lipid Panel:  Recent Labs   Lab 03/08/23 0446   CHOL 179   HDL 55   LDLCALC 105.2   TRIG 94   CHOLHDL 30.7       Cardiac Profile:  Recent Labs   Lab 03/07/23  1528 03/10/23  0427   * 135*   *  --        Inflammatory Labs:  Recent Labs   Lab 03/07/23  1941   CRP 5.79*       Diabetes:  Recent Labs   Lab 03/08/23  0446   HGBA1C 5.8       Thyroid & Parathyroid:  Recent Labs   Lab 03/08/23 0446   TSH 1.300       Medications: Pertinent Medications reviewed    Scheduled Meds:   aspirin  324 mg Oral Daily    atorvastatin  20 mg Oral QHS    azithromycin  250 mg Oral Daily    benzonatate  100 mg Oral TID    calcium carbonate "  500 mg Oral TID WM    enoxaparin  40 mg Subcutaneous Q12H    famotidine  20 mg Oral BID    isosorbide mononitrate  30 mg Oral Daily    levalbuterol  0.63 mg Nebulization Q8H    losartan  50 mg Oral QHS    magnesium oxide  400 mg Oral BID AC    metoprolol tartrate  25 mg Oral BID    potassium chloride  20 mEq Oral BID AC    spironolactone  25 mg Oral Daily       Continuous Infusions:   sodium chloride 0.9% 100 mL/hr at 03/10/23 1445       PRN Meds:.dextrose 10%, dextrose 10%, glucagon (human recombinant), glucose, glucose, hydrALAZINE, ibuprofen, magnesium oxide, magnesium oxide, melatonin, naloxone, potassium bicarbonate, potassium bicarbonate, potassium bicarbonate, potassium, sodium phosphates, potassium, sodium phosphates, potassium, sodium phosphates, sodium chloride 0.9%, sodium chloride 0.9%    Estimated/Assessed Needs  Weight Used For Calorie Calculations: 129.9 kg (286 lb 6 oz)  Energy Calorie Requirements (kcal): 2598 (20 kcal/kg)  Energy Need Method: Kcal/kg  Protein Requirements: 105 - 140 (1.5 - 2 g/kg IBW)  Weight Used For Protein Calculations: 70 kg (154 lb 5.2 oz) (IBW)  Fluid Requirements (mL): 1750 (25 ml/kg IBW) or per MD  Estimated Fluid Requirement Method: other (see comments)  RDA Method (mL): 2598       Nutrition Prescription Ordered  Current Diet Order: Cardiac    Evaluation of Received Nutrient/Fluid Intake  Energy Calories Required: meeting needs  Protein Required: meeting needs  Fluid Required: meeting needs  Tolerance: tolerating     Intake/Output Summary (Last 24 hours) at 3/10/2023 1624  Last data filed at 3/10/2023 1515  Gross per 24 hour   Intake 730 ml   Output 1400 ml   Net -670 ml      % Intake of Estimated Energy Needs: 75 - 100 %  % Meal Intake: 75 - 100 %    Nutrition Risk  Level of Risk/Frequency of Follow-up: moderate - high     Monitor and Evaluation  Food and Nutrient Intake: energy intake, food and beverage intake  Food and Nutrient Adminstration: diet  order  Knowledge/Beliefs/Attitudes: beliefs and attitudes, food and nutrition knowledge/skill  Physical Activity and Function: factors affecting access to physical activity, nutrition-related ADLs and IADLs  Anthropometric Measurements: weight, weight change, body mass index  Biochemical Data, Medical Tests and Procedures: electrolyte and renal panel, inflammatory profile, gastrointestinal profile, lipid profile, glucose/endocrine profile  Nutrition-Focused Physical Findings: overall appearance     Nutrition Follow-Up  RD Follow-up?: Yes    Merissa Ernandez RD 03/10/2023 4:25 PM

## 2023-03-10 NOTE — CARE UPDATE
03/09/23 2346   Patient Assessment/Suction   Respiratory Effort Unlabored   All Lung Fields Breath Sounds diminished   Rhythm/Pattern, Respiratory pattern regular   Skin Integrity   $ Wound Care Tech Time 15 min   Area Observed Bridge of nose   Skin Appearance without discoloration   PRE-TX-O2   Device (Oxygen Therapy) BIPAP   Oxygen Concentration (%) 0.21   SpO2 95 %   Pulse 78   Resp 16   Aerosol Therapy   $ Aerosol Therapy Charges Aerosol Treatment   Respiratory Treatment Status (SVN) given   Treatment Route (SVN) in-line   Patient Position (SVN) HOB elevated   Post Treatment Assessment (SVN) increased aeration   Signs of Intolerance (SVN) none   Breath Sounds Post-Respiratory Treatment   Throughout All Fields Post-Treatment All Fields   Throughout All Fields Post-Treatment aeration increased   Post-treatment Heart Rate (beats/min) 78   Post-treatment Resp Rate (breaths/min) 20   Ready to Wean/Extubation Screen   FIO2<=50 (chart decimal) 0   Preset CPAP/BiPAP Settings   Mode Of Delivery BiPAP   Ipap 16   EPAP (cm H2O) 10   Pressure Support (cm H2O) 6   Set Rate (Breaths/Min) 18   ITime (sec) 0.95   Rise Time (sec) 3   Patient CPAP/BiPAP Settings   RR Total (Breaths/Min) 14   Tidal Volume (mL) 542   VE Minute Ventilation (L/min) 12.6 L/min   Peak Inspiratory Pressure (cm H2O) 17   TiTOT (%) 26   Total Leak (L/Min) 10   Patient Trigger - ST Mode Only (%) 89   CPAP/BiPAP Backup Settings   IPAP Backup 16 cmH2O   EPAP Backup 10 cmH2O   Backup Rate 18 breaths per minute (bpm)   FIO2 Backup 0.21 %   ITIME Backup 0.95 seconds   Rise Time Backup 3 seconds   CPAP/BiPAP Alarms   High Pressure (cm H2O) 25   Low Pressure (cm H2O) 10   Minute Ventilation (L/Min) 3   High RR (breaths/min) 50   Low RR (breaths/min) 10   Apnea (Sec) 20   Education   $ Education 15 min;BiPAP   Respiratory Evaluation   $ Care Plan Tech Time 15 min   $ Eval/Re-eval Charges Re-evaluation   Evaluation For   (CARE PLAN)

## 2023-03-11 PROBLEM — J18.9 PNEUMONIA: Status: RESOLVED | Noted: 2023-03-10 | Resolved: 2023-03-11

## 2023-03-11 PROBLEM — J81.0 ACUTE PULMONARY EDEMA: Status: RESOLVED | Noted: 2023-03-07 | Resolved: 2023-03-11

## 2023-03-11 PROBLEM — E66.01 SEVERE OBESITY (BMI >= 40): Status: ACTIVE | Noted: 2023-03-11

## 2023-03-11 PROBLEM — D50.9 MICROCYTIC ANEMIA: Status: ACTIVE | Noted: 2023-03-11

## 2023-03-11 LAB
ALBUMIN SERPL BCP-MCNC: 3.7 G/DL (ref 3.5–5.2)
ALP SERPL-CCNC: 57 U/L (ref 55–135)
ALT SERPL W/O P-5'-P-CCNC: 26 U/L (ref 10–44)
ANION GAP SERPL CALC-SCNC: 10 MMOL/L (ref 8–16)
AST SERPL-CCNC: 21 U/L (ref 10–40)
BASOPHILS # BLD AUTO: 0.07 K/UL (ref 0–0.2)
BASOPHILS NFR BLD: 0.8 % (ref 0–1.9)
BILIRUB SERPL-MCNC: 0.9 MG/DL (ref 0.1–1)
BUN SERPL-MCNC: 21 MG/DL (ref 6–20)
CALCIUM SERPL-MCNC: 8.9 MG/DL (ref 8.7–10.5)
CHLORIDE SERPL-SCNC: 99 MMOL/L (ref 95–110)
CO2 SERPL-SCNC: 27 MMOL/L (ref 23–29)
CREAT SERPL-MCNC: 1.2 MG/DL (ref 0.5–1.4)
DIFFERENTIAL METHOD: ABNORMAL
EOSINOPHIL # BLD AUTO: 0.2 K/UL (ref 0–0.5)
EOSINOPHIL NFR BLD: 2.7 % (ref 0–8)
ERYTHROCYTE [DISTWIDTH] IN BLOOD BY AUTOMATED COUNT: 15.1 % (ref 11.5–14.5)
EST. GFR  (NO RACE VARIABLE): >60 ML/MIN/1.73 M^2
GLUCOSE SERPL-MCNC: 103 MG/DL (ref 70–110)
HCT VFR BLD AUTO: 38.5 % (ref 40–54)
HGB BLD-MCNC: 12.9 G/DL (ref 14–18)
IMM GRANULOCYTES # BLD AUTO: 0.03 K/UL (ref 0–0.04)
IMM GRANULOCYTES NFR BLD AUTO: 0.3 % (ref 0–0.5)
LYMPHOCYTES # BLD AUTO: 3.5 K/UL (ref 1–4.8)
LYMPHOCYTES NFR BLD: 39.7 % (ref 18–48)
MAGNESIUM SERPL-MCNC: 2.2 MG/DL (ref 1.6–2.6)
MCH RBC QN AUTO: 26.2 PG (ref 27–31)
MCHC RBC AUTO-ENTMCNC: 33.5 G/DL (ref 32–36)
MCV RBC AUTO: 78 FL (ref 82–98)
MONOCYTES # BLD AUTO: 0.7 K/UL (ref 0.3–1)
MONOCYTES NFR BLD: 8.1 % (ref 4–15)
NEUTROPHILS # BLD AUTO: 4.3 K/UL (ref 1.8–7.7)
NEUTROPHILS NFR BLD: 48.4 % (ref 38–73)
NRBC BLD-RTO: 0 /100 WBC
PHOSPHATE SERPL-MCNC: 4.8 MG/DL (ref 2.7–4.5)
PLATELET # BLD AUTO: 383 K/UL (ref 150–450)
PMV BLD AUTO: 11.4 FL (ref 9.2–12.9)
POTASSIUM SERPL-SCNC: 4 MMOL/L (ref 3.5–5.1)
PROT SERPL-MCNC: 7.8 G/DL (ref 6–8.4)
RBC # BLD AUTO: 4.93 M/UL (ref 4.6–6.2)
SODIUM SERPL-SCNC: 136 MMOL/L (ref 136–145)
WBC # BLD AUTO: 8.86 K/UL (ref 3.9–12.7)

## 2023-03-11 PROCEDURE — 99900035 HC TECH TIME PER 15 MIN (STAT)

## 2023-03-11 PROCEDURE — 94640 AIRWAY INHALATION TREATMENT: CPT

## 2023-03-11 PROCEDURE — 94761 N-INVAS EAR/PLS OXIMETRY MLT: CPT

## 2023-03-11 PROCEDURE — 25000003 PHARM REV CODE 250: Performed by: NURSE PRACTITIONER

## 2023-03-11 PROCEDURE — 36415 COLL VENOUS BLD VENIPUNCTURE: CPT | Performed by: NURSE PRACTITIONER

## 2023-03-11 PROCEDURE — 84100 ASSAY OF PHOSPHORUS: CPT | Performed by: NURSE PRACTITIONER

## 2023-03-11 PROCEDURE — 94660 CPAP INITIATION&MGMT: CPT

## 2023-03-11 PROCEDURE — 63600175 PHARM REV CODE 636 W HCPCS: Performed by: FAMILY MEDICINE

## 2023-03-11 PROCEDURE — 94799 UNLISTED PULMONARY SVC/PX: CPT

## 2023-03-11 PROCEDURE — 99900031 HC PATIENT EDUCATION (STAT)

## 2023-03-11 PROCEDURE — 25000003 PHARM REV CODE 250: Performed by: STUDENT IN AN ORGANIZED HEALTH CARE EDUCATION/TRAINING PROGRAM

## 2023-03-11 PROCEDURE — 21400001 HC TELEMETRY ROOM

## 2023-03-11 PROCEDURE — 27000221 HC OXYGEN, UP TO 24 HOURS

## 2023-03-11 PROCEDURE — 99233 PR SUBSEQUENT HOSPITAL CARE,LEVL III: ICD-10-PCS | Mod: ,,, | Performed by: INTERNAL MEDICINE

## 2023-03-11 PROCEDURE — 80053 COMPREHEN METABOLIC PANEL: CPT | Performed by: NURSE PRACTITIONER

## 2023-03-11 PROCEDURE — 85025 COMPLETE CBC W/AUTO DIFF WBC: CPT | Performed by: NURSE PRACTITIONER

## 2023-03-11 PROCEDURE — 83735 ASSAY OF MAGNESIUM: CPT | Performed by: NURSE PRACTITIONER

## 2023-03-11 PROCEDURE — 25000003 PHARM REV CODE 250: Performed by: HOSPITALIST

## 2023-03-11 PROCEDURE — 25000242 PHARM REV CODE 250 ALT 637 W/ HCPCS: Performed by: NURSE PRACTITIONER

## 2023-03-11 PROCEDURE — 99233 SBSQ HOSP IP/OBS HIGH 50: CPT | Mod: ,,, | Performed by: INTERNAL MEDICINE

## 2023-03-11 RX ORDER — BENZONATATE 100 MG/1
100 CAPSULE ORAL 3 TIMES DAILY PRN
Status: DISCONTINUED | OUTPATIENT
Start: 2023-03-11 | End: 2023-03-16 | Stop reason: HOSPADM

## 2023-03-11 RX ADMIN — CALCIUM CARBONATE (ANTACID) CHEW TAB 500 MG 500 MG: 500 CHEW TAB at 09:03

## 2023-03-11 RX ADMIN — LEVALBUTEROL HYDROCHLORIDE 0.63 MG: 0.63 SOLUTION RESPIRATORY (INHALATION) at 08:03

## 2023-03-11 RX ADMIN — BENZONATATE 100 MG: 100 CAPSULE ORAL at 02:03

## 2023-03-11 RX ADMIN — FAMOTIDINE 20 MG: 20 TABLET ORAL at 08:03

## 2023-03-11 RX ADMIN — METOPROLOL TARTRATE 25 MG: 25 TABLET, FILM COATED ORAL at 09:03

## 2023-03-11 RX ADMIN — POTASSIUM CHLORIDE 20 MEQ: 1500 TABLET, EXTENDED RELEASE ORAL at 05:03

## 2023-03-11 RX ADMIN — LEVALBUTEROL HYDROCHLORIDE 0.63 MG: 0.63 SOLUTION RESPIRATORY (INHALATION) at 02:03

## 2023-03-11 RX ADMIN — FAMOTIDINE 20 MG: 20 TABLET ORAL at 09:03

## 2023-03-11 RX ADMIN — MAGNESIUM OXIDE 400 MG (241.3 MG MAGNESIUM) TABLET 400 MG: at 04:03

## 2023-03-11 RX ADMIN — POTASSIUM CHLORIDE 20 MEQ: 1500 TABLET, EXTENDED RELEASE ORAL at 04:03

## 2023-03-11 RX ADMIN — SACUBITRIL AND VALSARTAN 1 TABLET: 24; 26 TABLET, FILM COATED ORAL at 08:03

## 2023-03-11 RX ADMIN — ENOXAPARIN SODIUM 40 MG: 100 INJECTION SUBCUTANEOUS at 09:03

## 2023-03-11 RX ADMIN — CALCIUM CARBONATE (ANTACID) CHEW TAB 500 MG 500 MG: 500 CHEW TAB at 04:03

## 2023-03-11 RX ADMIN — BENZONATATE 100 MG: 100 CAPSULE ORAL at 09:03

## 2023-03-11 RX ADMIN — CALCIUM CARBONATE (ANTACID) CHEW TAB 500 MG 500 MG: 500 CHEW TAB at 12:03

## 2023-03-11 RX ADMIN — MAGNESIUM OXIDE 400 MG (241.3 MG MAGNESIUM) TABLET 400 MG: at 05:03

## 2023-03-11 RX ADMIN — LEVALBUTEROL HYDROCHLORIDE 0.63 MG: 0.63 SOLUTION RESPIRATORY (INHALATION) at 01:03

## 2023-03-11 RX ADMIN — METOPROLOL TARTRATE 25 MG: 25 TABLET, FILM COATED ORAL at 08:03

## 2023-03-11 RX ADMIN — SPIRONOLACTONE 25 MG: 25 TABLET, FILM COATED ORAL at 09:03

## 2023-03-11 RX ADMIN — ISOSORBIDE MONONITRATE 30 MG: 30 TABLET, EXTENDED RELEASE ORAL at 09:03

## 2023-03-11 RX ADMIN — ENOXAPARIN SODIUM 40 MG: 100 INJECTION SUBCUTANEOUS at 08:03

## 2023-03-11 RX ADMIN — ATORVASTATIN CALCIUM 20 MG: 20 TABLET, FILM COATED ORAL at 08:03

## 2023-03-11 RX ADMIN — ASPIRIN 81 MG CHEWABLE TABLET 324 MG: 81 TABLET CHEWABLE at 09:03

## 2023-03-11 NOTE — ASSESSMENT & PLAN NOTE
Echo    Interpretation Summary  · The left ventricle is normal in size with mild eccentric hypertrophy and severely decreased systolic function.  · The estimated ejection fraction is 25%.  · Grade III left ventricular diastolic dysfunction.  · Normal right ventricular size with normal right ventricular systolic function.  · Mild right atrial enlargement.  · Moderate mitral regurgitation.  · Mild to moderate tricuspid regurgitation.  · Mild pulmonic regurgitation.  · Moderate left atrial enlargement.   Creatinine level today a bit higher.. So IV diuretics stopped. Monitor on telemetry.   Monitor strict Is&Os and daily weights.  . Continue to stress to patient importance of self efficacy and  on diet for CHF.  Cardiology consulting.  The plan is [facility] to rule out significant coronary artery disease.  Last BNP reviewed- and noted below   Recent Labs   Lab 03/10/23  0627   *   .

## 2023-03-11 NOTE — PLAN OF CARE
Problem: Adult Inpatient Plan of Care  Goal: Plan of Care Review  Outcome: Ongoing, Progressing  Goal: Readiness for Transition of Care  Outcome: Ongoing, Progressing     Problem: Bariatric Environmental Safety  Goal: Safety Maintained with Care  Outcome: Ongoing, Progressing     Problem: Infection (Pneumonia)  Goal: Resolution of Infection Signs and Symptoms  Outcome: Ongoing, Progressing

## 2023-03-11 NOTE — CARE UPDATE
03/11/23 0822   Patient Assessment/Suction   Level of Consciousness (AVPU) alert   Respiratory Effort Unlabored   Expansion/Accessory Muscles/Retractions no use of accessory muscles   All Lung Fields Breath Sounds diminished   Skin Integrity   $ Wound Care Tech Time 15 min   Area Observed Bridge of nose   Skin Appearance without discoloration   PRE-TX-O2   Device (Oxygen Therapy) room air   SpO2 98 %   Pulse Oximetry Type Intermittent   $ Pulse Oximetry - Multiple Charge Pulse Oximetry - Multiple   Aerosol Therapy   $ Aerosol Therapy Charges Aerosol Treatment   Daily Review of Necessity (SVN) completed   Respiratory Treatment Status (SVN) given   Treatment Route (SVN) mouthpiece;oxygen   Patient Position (SVN) HOB elevated   Post Treatment Assessment (SVN) increased aeration   Signs of Intolerance (SVN) none   Preset CPAP/BiPAP Settings   Mode Of Delivery BiPAP S/T   $ CPAP/BiPAP Daily Charge BiPAP/CPAP Daily   $ Initial CPAP/BiPAP Setup? No   $ Is patient using? No/refused   Education   $ Education Bronchodilator;15 min   Respiratory Evaluation   $ Care Plan Tech Time 15 min   $ Eval/Re-eval Charges Re-evaluation

## 2023-03-11 NOTE — SUBJECTIVE & OBJECTIVE
Interval History:  doing well today.  Angiogram was planned for today, but creatinine bumped up a bit.  He has no new complaints.    Review of Systems   Constitutional:  Negative for chills and fever.   Respiratory:  Negative for cough, shortness of breath and wheezing.    Cardiovascular:  Negative for chest pain and leg swelling.   Gastrointestinal:  Negative for abdominal pain and nausea.   Objective:     Vital Signs (Most Recent):  Temp: 98.1 °F (36.7 °C) (03/10/23 1909)  Pulse: 97 (03/10/23 2020)  Resp: 18 (03/10/23 1909)  BP: 119/62 (03/10/23 2021)  SpO2: 95 % (03/10/23 1909) Vital Signs (24h Range):  Temp:  [97.8 °F (36.6 °C)-98.6 °F (37 °C)] 98.1 °F (36.7 °C)  Pulse:  [78-97] 97  Resp:  [16-18] 18  SpO2:  [95 %-97 %] 95 %  BP: (109-120)/(56-70) 119/62     Weight: 129.9 kg (286 lb 6 oz)  Body mass index is 43.55 kg/m².    Intake/Output Summary (Last 24 hours) at 3/10/2023 2129  Last data filed at 3/10/2023 1815  Gross per 24 hour   Intake 720 ml   Output 800 ml   Net -80 ml      Physical Exam  Vitals reviewed.   Constitutional:       General: He is not in acute distress.     Appearance: He is not diaphoretic.   HENT:      Mouth/Throat:      Mouth: Mucous membranes are moist.   Eyes:      General: No scleral icterus.        Right eye: No discharge.         Left eye: No discharge.   Neck:      Vascular: No JVD.   Cardiovascular:      Rate and Rhythm: Normal rate and regular rhythm.   Pulmonary:      Effort: Pulmonary effort is normal.      Breath sounds: Normal breath sounds.   Abdominal:      General: Bowel sounds are normal. There is no distension.      Palpations: Abdomen is soft.      Tenderness: There is no abdominal tenderness.   Musculoskeletal:      Right lower leg: Edema present.      Left lower leg: Edema present.   Skin:     General: Skin is warm.      Findings: No rash.   Neurological:      Mental Status: He is alert.       Significant Labs: All pertinent labs within the past 24 hours have been  reviewed.    Significant Imaging:  None new

## 2023-03-11 NOTE — ASSESSMENT & PLAN NOTE
Due to CHF.  Will stop IV diuretics in light of the bump in creatinine level.  Monitor volume status.

## 2023-03-11 NOTE — PROGRESS NOTES
[facility]  Department of Cardiology  Progress Note      PATIENT NAME: Wenceslao Jeong  MRN: [id number]  TODAY'S DATE: 03/11/2023  ADMIT DATE: 3/7/2023                          CONSULT REQUESTED BY: Andrea Corrales MD    SUBJECTIVE     PRINCIPAL PROBLEM: Acute combined systolic and diastolic congestive heart failure      REASON FOR CONSULT:    Newly diagnosed systolic HF      INTERVAL HISTORY:  3/11/23:  Pt OOB in chair; denies dyspnea or cough; edema improved;   Creatinine improved today, 1.2; VSS, NSR on telemetry w/o any acute events overnight    3/10/23  Patient is resting comfortably during examination.  No acute distress.  Alert and oriented.  Vital signs stable.  Patient was supposed to have angiogram this morning.  However, creatinine increased to 1.5 overnight.  At this time we will postpone angiogram to Monday morning in view of LISA.      HPI:     Patient is a 27-year-old male with past medical history of hypertension who presented to the ED with acute onset of chest pain and shortness of breath with exertion, alleviated with rest for the past 3 weeks, worsening over the past 2-3 days.  Patient also reports a cough with pink foamy sputum.  Patient was recently started on losartan 50 mg daily on February 1, 2023 by his PCP.  Patient denies fever, chills, diaphoresis, lightheadedness, dizziness, palpitations, edema or bleeding.  Chest x-ray shows right middle and lower lung pneumonia.  CT of the chest also shows multifocal pneumonia.  Blood pressure significantly elevated in ED and patient was started on a Tridil drip.  , echocardiogram performed with EF of 25% and grade 3 diastolic dysfunction, moderate MR/TR.  Patient was also found to be hypoxic and placed on 5 L and subsequently placed on BiPAP for worsening dyspnea.    During examination, patient is alert and oriented, on room air, no acute distress, sinus rhythm on telemetry.  Patient states he is feeling much better and denies  "active chest pain or shortness of breath.      FROM H&P:  HPI: Wenceslao Jeong is a 27 y.o. male with a history as  has no past medical history on file. who presented to the ED with a Chest Pain (Acute onset of chest pain during exertion. Allevaited with rest. Pt reports hx of MEJIA. Hx of HTN. )     Patient presents to the ED with a c/o SOB for about 3 weeks worsening on last 2-3 days and now with cough with pink foamy sputum. Patient reports he was seen by his PCP because he had been SOB and was told he did not have pneumonia, however was started on BP medications (losartan 50 mg daily) February 1, 2023.  Patient further states he was supposed to follow up with PCP to see if the losartan was working but he missed the appointment because his job was short and he had to fill in.      Denies fever, chills, diaphoresis, dizziness, HA, chest pain, palpitations, NVD, recent trauma or any other associated symptoms. No aggravating and alleviating factor.  Does not smoke cigarettes, drink or do illegal drugs.      Lab and imaging obtained and reviewed. CBC shows H/H 12.6/37.1 MCV 77 MCH 26.3 RDW 14.9. CMP shows Na+ 133 AG 7 glucose 112 Ca+ 8.4 T. Bili 1.7. . Initial Troponin 25.9. EKG shows sinus tachycardia; with possible left atrial enlargement. CXR shows right mid and lower lung pneumonia. On admit, temp 99.3 Hr 114 RR 16 /110 sats 95% on RA           Per ED provider, patient presented with SOB and productive cough "pink frothy sputum". CXR was completed and showed right mid/lower PNA. Started on IV ABX. However, BNP was elevated and given symptom, a bedside Echo was completed which per ED provider more consistent with HF with EF approx 50%. BP significantly elevated tridil gtt initiated, with good BP controlled, tridil gtt discontinued. Patient was also noted to have a decrease in sats to 91% on 5 liters O2, placed on Bipap with O2 sats improving, now currently on 2L o2 per NC with O2 sats 96% on assessment. "         Review of patient's allergies indicates:  No Known Allergies    No past medical history on file.  No past surgical history on file.        REVIEW OF SYSTEMS  CONSTITUTIONAL: Negative for chills, fatigue and fever.   EYES: No double vision, No blurred vision  NEURO: No headaches, No dizziness  RESPIRATORY:  Improvement in SOB and cough;    CARDIOVASCULAR:  No recent CP. Negative for palpitations and leg swelling.   GI: Negative for abdominal pain, No melena, diarrhea, nausea and vomiting.   : Negative for dysuria and frequency, Negative for hematuria  SKIN: Negative for bruising, Negative for edema or discoloration noted.   ENDOCRINE: Negative for polyphagia, Negative for heat intolerance, Negative for cold intolerance  PSYCHIATRIC: Negative for depression, Negative for anxiety, Negative for memory loss  MUSCULOSKELETAL: Negative for neck pain, Negative for muscle weakness, Negative for back pain     OBJECTIVE     VITAL SIGNS (Most Recent)  Temp: 98 °F (36.7 °C) (03/11/23 0732)  Pulse: 96 (03/11/23 0821)  Resp: 18 (03/11/23 0821)  BP: 119/69 (03/11/23 0732)  SpO2: 98 % (03/11/23 0822)    VENTILATION STATUS  Resp: 18 (03/11/23 0821)  SpO2: 98 % (03/11/23 0822)  Oxygen Concentration (%):  [21] 21    I & O (Last 24H):  Intake/Output Summary (Last 24 hours) at 3/11/2023 0953  Last data filed at 3/11/2023 0946  Gross per 24 hour   Intake 1230 ml   Output 800 ml   Net 430 ml         WEIGHTS  Wt Readings from Last 3 Encounters:   03/11/23 0332 131.5 kg (289 lb 14.5 oz)   03/10/23 0555 129.9 kg (286 lb 6 oz)   03/09/23 0417 130.6 kg (287 lb 14.7 oz)   03/07/23 1458 (!) 138.3 kg (305 lb)   03/08/23 1353 (!) 138.3 kg (305 lb)       PHYSICAL EXAM  GENERAL: well built, obese male OOB in chair, breathing comfortably in no apparent distress alert and oriented.   HEENT: Normocephalic. Pupils normal and conjunctivae normal.    NECK: No JVD. No bruit..   THYROID: Thyroid not examined  CARDIAC: Regular rate and rhythm. S1 is  normal.S2 is normal.No gallops, clicks or murmurs noted at this time.  CHEST ANATOMY: normal.   LUNGS: Clear to auscultation. No wheezing or rhonchi..   ABDOMEN: Soft. Normal bowel sounds.  Nontender  URINARY: No serna catheter   EXTREMITIES: No cyanosis, clubbing or edema noted at this time.  PERIPHERAL VASCULAR SYSTEM: Good palpable distal pulses.   CENTRAL NERVOUS SYSTEM: No focal motor or sensory deficits noted.   SKIN: Skin without lesions, moist, well perfused.   MUSCLE STRENGTH & TONE: No noteable weakness, atrophy or abnormal movement.     HOME MEDICATIONS:  No current facility-administered medications on file prior to encounter.     Current Outpatient Medications on File Prior to Encounter   Medication Sig Dispense Refill    albuterol (PROVENTIL/VENTOLIN HFA) 90 mcg/actuation inhaler Inhale 2 puffs into the lungs every 4 (four) hours as needed.      losartan (COZAAR) 50 MG tablet Take 50 mg by mouth once daily.         SCHEDULED MEDS:   aspirin  324 mg Oral Daily    atorvastatin  20 mg Oral QHS    benzonatate  100 mg Oral TID    calcium carbonate  500 mg Oral TID WM    enoxaparin  40 mg Subcutaneous Q12H    famotidine  20 mg Oral BID    isosorbide mononitrate  30 mg Oral Daily    levalbuterol  0.63 mg Nebulization Q8H    losartan  50 mg Oral QHS    magnesium oxide  400 mg Oral BID AC    metoprolol tartrate  25 mg Oral BID    potassium chloride  20 mEq Oral BID AC    spironolactone  25 mg Oral Daily       CONTINUOUS INFUSIONS:        PRN MEDS:dextrose 10%, dextrose 10%, glucagon (human recombinant), glucose, glucose, hydrALAZINE, ibuprofen, magnesium oxide, magnesium oxide, melatonin, naloxone, potassium bicarbonate, potassium bicarbonate, potassium bicarbonate, potassium, sodium phosphates, potassium, sodium phosphates, potassium, sodium phosphates, sodium chloride 0.9%, sodium chloride 0.9%    LABS AND DIAGNOSTICS     CBC LAST 3 DAYS  Recent Labs   Lab 03/09/23  0407 03/10/23  0427 03/11/23  0424   WBC  9.54 7.88 8.86   RBC 4.65 4.77 4.93   HGB 12.2* 12.5* 12.9*   HCT 35.7* 36.8* 38.5*   MCV 77* 77* 78*   MCH 26.2* 26.2* 26.2*   MCHC 34.2 34.0 33.5   RDW 15.2* 15.1* 15.1*    345 383   MPV 12.0 11.3 11.4   GRAN 53.0  5.1 50.0  4.0 48.4  4.3   LYMPH 35.5  3.4 37.2  2.9 39.7  3.5   MONO 8.7  0.8 9.4  0.7 8.1  0.7   BASO 0.05 0.06 0.07   NRBC 0 0 0         COAGULATION LAST 3 DAYS  No results for input(s): LABPT, INR, APTT in the last 168 hours.    CHEMISTRY LAST 3 DAYS  Recent Labs   Lab 03/07/23  1734 03/08/23  0446 03/09/23  0407 03/10/23  0427 03/11/23  0424   NA  --    < > 136 139  138 136   K  --    < > 3.9 4.0  4.0 4.0   CL  --    < > 99 101  99 99   CO2  --    < > 28 29  28 27   ANIONGAP  --    < > 9 9  11 10   BUN  --    < > 23* 23*  22* 21*   CREATININE  --    < > 1.4 1.4  1.5* 1.2   GLU  --    < > 117* 119*  121* 103   CALCIUM  --    < > 8.5* 8.9  8.8 8.9   PH 7.393  --   --   --   --    MG  --    < > 2.2 2.3 2.2   ALBUMIN  --    < > 3.3* 3.4* 3.7   PROT  --    < > 7.0 7.1 7.8   ALKPHOS  --    < > 51* 59 57   ALT  --    < > 29 24 26   AST  --    < > 26 18 21   BILITOT  --    < > 1.2* 0.7 0.9    < > = values in this interval not displayed.         CARDIAC PROFILE LAST 3 DAYS  Recent Labs   Lab 03/07/23  1528 03/07/23  1941 03/08/23  0446 03/10/23  0427   *  --   --  135*   *  --   --   --    TROPONINIHS 25.9* 25.3* 27.1*  --          ENDOCRINE LAST 3 DAYS  Recent Labs   Lab 03/07/23  1941 03/08/23  0446   TSH  --  1.300   PROCAL 0.05  --          LAST ARTERIAL BLOOD GAS  ABG  Recent Labs   Lab 03/07/23  1734   PH 7.393   PO2 50*   PCO2 39.8   HCO3 24.3   BE -1         LAST 7 DAYS MICROBIOLOGY   Microbiology Results (last 7 days)       ** No results found for the last 168 hours. **            MOST RECENT IMAGING  Echo  Addendum: · The left ventricle is normal in size with mild eccentric hypertrophy and  severely decreased systolic function.   · The estimated ejection  fraction is 25%.   · Grade III left ventricular diastolic dysfunction.   · Normal right ventricular size with normal right ventricular systolic  function.   · Mild right atrial enlargement.   · Moderate mitral regurgitation.   · Mild to moderate tricuspid regurgitation.   · Mild pulmonic regurgitation.   · Moderate left atrial enlargement.  Narrative: · The left ventricle is normal in size with mild eccentric hypertrophy and   mildly decreased systolic function.  · The estimated ejection fraction is 45%.  · Grade III left ventricular diastolic dysfunction.  · Normal right ventricular size with normal right ventricular systolic   function.  · Moderate left atrial enlargement.  · Mild right atrial enlargement.  · Moderate mitral regurgitation.  · Mild to moderate tricuspid regurgitation.  · Mild pulmonic regurgitation.     CTA Chest Non-Coronary (PE Studies)  CMS MANDATED QUALITY DATA-CT RADIATION DOSE-[id number]  All CT scans at this facility use dose modulation, iterative reconstruction, and or weight-based dosing when appropriate to reduce radiation dose to as low as reasonably achievable.    HISTORY: Pulmonary embolism (PE) suspected, unknown D-dimer    FINDINGS: Thin axial imaging through the chest was performed with 100 mL Omnipaque 350 IV contrast, with sagittal and coronal reformatted images and MIP reconstructions performed, and images stored in the patient's permanent electronic medical record.    Comparison to the chest radiograph of the prior day. There are no pulmonary arterial filling defects to suggest pulmonary thromboembolism. The central pulmonary arteries are normal in caliber.    The aorta is normal in caliber and tapers appropriately. The heart is enlarged, with dilated left ventricle. There is no pericardial effusion, with no enlarged mediastinal or hilar lymph nodes.    There are patchy airspace opacities and groundglass densities in both lungs, most pronounced in the right lower lobe with some air  bronchograms, suspicious for multifocal pneumonia. There is no evidence of interstitial pulmonary edema, with no pleural effusion. The central airways are patent, with no pneumothorax.    Images of the upper abdomen are unremarkable. There are no acute fractures or destructive osseous lesions.    IMPRESSION:  1. Negative for pulmonary thromboembolism.  2. Scattered interstitial and airspace opacities in both lungs, suspicious for multifocal pneumonia. Viral or atypical pneumonia could have this appearance.  3. Cardiomegaly with dilated left ventricle.    Electronically signed by:  Darrin Roland MD  3/8/2023 11:38 AM [facility] Workstation: [id number]      ECHOCARDIOGRAM RESULTS (last 5)  Results for orders placed during the hospital encounter of 03/07/23    Echo    Interpretation Summary  · The left ventricle is normal in size with mild eccentric hypertrophy and severely decreased systolic function.  · The estimated ejection fraction is 25%.  · Grade III left ventricular diastolic dysfunction.  · Normal right ventricular size with normal right ventricular systolic function.  · Mild right atrial enlargement.  · Moderate mitral regurgitation.  · Mild to moderate tricuspid regurgitation.  · Mild pulmonic regurgitation.  · Moderate left atrial enlargement.      CURRENT/PREVIOUS VISIT EKG  Results for orders placed or performed during the hospital encounter of 03/07/23   EKG 12-lead    Collection Time: 03/08/23  7:46 AM    Narrative    Test Reason : R94.31,    Vent. Rate : 104 BPM     Atrial Rate : 104 BPM     P-R Int : 162 ms          QRS Dur : 088 ms      QT Int : 356 ms       P-R-T Axes : 048 026 066 degrees     QTc Int : 468 ms    Sinus tachycardia  Possible Left atrial enlargement  Nonspecific T wave abnormality  Abnormal ECG  When compared with ECG of 07-MAR-2023 15:08,  Nonspecific T wave abnormality now evident in Inferior leads    Referred By: [email or web address]   SELF           Confirmed By:            ASSESSMENT/PLAN:      Active Hospital Problems    Diagnosis    *Acute combined systolic and diastolic congestive heart failure    Pneumonia    Acute pulmonary edema    Primary hypertension       ASSESSMENT & PLAN:     New onset combined systolic and diastolic heart failure  Acute pulmonary edema  Hypertensive urgency  Acute respiratory failure with hypoxia  Right middle/lower lobe pneumonia  Elevated troponin    RECOMMENDATIONS:    Patient presented with hypertensive emergency and new onset combined systolic and diastolic heart failure- EF of 25% with grade 3 diastolic dysfunction as well as moderate MR/TR.      Patient had downtrend in BNP and weight loss of 16 lbs since 3/8/23. Diuretics on hold. Stopped Losartan and will start Entresto 24-26 mg po BID starting this evening.    Continue daily weights, strict I&Os, 1.5 L fluid restriction.  Low-sodium heart healthy diet.    Blood pressure has remained normal in past 24 hrs.  Continue metoprolol tartrate 25 mg b.i.d., isosorbide mononitrate 30 mg daily, and spironolactone 25 mg daily. Stopped Losartan today and will start Entresto tonight.  Hold antihypertensives for systolic BP less than 100.    Angiogram planned for Monday to evaluate for myocardial ischemia.            Serena Bird NP  [facility]  Department of Cardiology  Date of Service: 03/11/2023      I have personally interviewed and examined the patient, I have reviewed the Nurse Practitioner's history and physical, assessment, and plan. I have personally evaluated the patient at bedside and agree with the findings and made appropriate changes as necessary in recommendations.    Barrett Fisher MD  Department of Cardiology  [facility]  3/11/23

## 2023-03-11 NOTE — ASSESSMENT & PLAN NOTE
Patient has a current diagnosis of hypertensive urgency (without evidence of end organ damage) which is controlled.  Latest blood pressure and vitals reviewed-   Temp:  [97.8 °F (36.6 °C)-98.6 °F (37 °C)]   Pulse:  [78-97]   Resp:  [16-18]   BP: (109-120)/(56-70)   SpO2:  [95 %-97 %] .   Patient currently off IV antihypertensives.   Home meds for hypertension were reviewed and noted below.   Hypertension Medications             losartan (COZAAR) 50 MG tablet Take 50 mg by mouth once daily.          Medication adjustment for hospital antihypertensives is as follows- Losartan discontinued (see above), started on CCB and diuretic.     Will aim for controlled BP reduction by medications noted above. Monitor and mitigate end organ damage as indicated.

## 2023-03-11 NOTE — ASSESSMENT & PLAN NOTE
Controlled.  On multiple meds to control it.  ISMN 30 daily.  Losartan 50 nightly.  Lopressor 25 BID.    Monitor pressures.x

## 2023-03-11 NOTE — PROGRESS NOTES
"[facility]  Progress Note    Patient Name: Wenceslao Jeong  MRN: [id number]  Patient Class: IP- Inpatient   Admission Date: 3/7/2023  Length of Stay: 3 days  Attending Physician: Nestor Mosley MD  Primary Care Provider: BELKYS Wheeler        Subjective:     Principal Problem:Acute combined systolic and diastolic congestive heart failure        HPI:  Wenceslao Jeong is a 27 y.o. male with a history as  has no past medical history on file. who presented to the ED with a Chest Pain (Acute onset of chest pain during exertion. Allevaited with rest. Pt reports hx of MEJIA. Hx of HTN. )    Patient presents to the ED with a c/o SOB for about 3 weeks worsening on last 2-3 days and now with cough with pink foamy sputum. Patient reports he was seen by his PCP because he had been SOB and was told he did not have pneumonia, however was started on BP medications (losartan 50 mg daily) February 1, 2023.  Patient further states he was supposed to follow up with PCP to see if the losartan was working but he missed the appointment because his job was short and he had to fill in.     Denies fever, chills, diaphoresis, dizziness, HA, chest pain, palpitations, NVD, recent trauma or any other associated symptoms. No aggravating and alleviating factor.  Does not smoke cigarettes, drink or do illegal drugs.     Lab and imaging obtained and reviewed. CBC shows H/H 12.6/37.1 MCV 77 MCH 26.3 RDW 14.9. CMP shows Na+ 133 AG 7 glucose 112 Ca+ 8.4 T. Bili 1.7. . Initial Troponin 25.9. EKG shows sinus tachycardia; with possible left atrial enlargement. CXR shows right mid and lower lung pneumonia. On admit, temp 99.3 Hr 114 RR 16 /110 sats 95% on RA         Per ED provider, patient presented with SOB and productive cough "pink frothy sputum". CXR was completed and showed right mid/lower PNA. Started on IV ABX. However, BNP was elevated and given symptom, a bedside Echo was completed which per ED " provider more consistent with HF with EF approx 50%. BP significantly elevated tridil gtt initiated, with good BP controlled, tridil gtt discontinued. Patient was also noted to have a decrease in sats to 91% on 5 liters O2, placed on Bipap with O2 sats improving, now currently on 2L o2 per NC with O2 sats 96% on assessment.                Overview/Hospital Course:  No notes on file    Interval History:  doing well today.  Angiogram was planned for today, but creatinine bumped up a bit.  He has no new complaints.    Review of Systems   Constitutional:  Negative for chills and fever.   Respiratory:  Negative for cough, shortness of breath and wheezing.    Cardiovascular:  Negative for chest pain and leg swelling.   Gastrointestinal:  Negative for abdominal pain and nausea.   Objective:     Vital Signs (Most Recent):  Temp: 98.1 °F (36.7 °C) (03/10/23 1909)  Pulse: 97 (03/10/23 2020)  Resp: 18 (03/10/23 1909)  BP: 119/62 (03/10/23 2021)  SpO2: 95 % (03/10/23 1909) Vital Signs (24h Range):  Temp:  [97.8 °F (36.6 °C)-98.6 °F (37 °C)] 98.1 °F (36.7 °C)  Pulse:  [78-97] 97  Resp:  [16-18] 18  SpO2:  [95 %-97 %] 95 %  BP: (109-120)/(56-70) 119/62     Weight: 129.9 kg (286 lb 6 oz)  Body mass index is 43.55 kg/m².    Intake/Output Summary (Last 24 hours) at 3/10/2023 2129  Last data filed at 3/10/2023 1815  Gross per 24 hour   Intake 720 ml   Output 800 ml   Net -80 ml      Physical Exam  Vitals reviewed.   Constitutional:       General: He is not in acute distress.     Appearance: He is not diaphoretic.   HENT:      Mouth/Throat:      Mouth: Mucous membranes are moist.   Eyes:      General: No scleral icterus.        Right eye: No discharge.         Left eye: No discharge.   Neck:      Vascular: No JVD.   Cardiovascular:      Rate and Rhythm: Normal rate and regular rhythm.   Pulmonary:      Effort: Pulmonary effort is normal.      Breath sounds: Normal breath sounds.   Abdominal:      General: Bowel sounds are normal.  There is no distension.      Palpations: Abdomen is soft.      Tenderness: There is no abdominal tenderness.   Musculoskeletal:      Right lower leg: Edema present.      Left lower leg: Edema present.   Skin:     General: Skin is warm.      Findings: No rash.   Neurological:      Mental Status: He is alert.       Significant Labs: All pertinent labs within the past 24 hours have been reviewed.    Significant Imaging:  None new      Assessment/Plan:      * Acute combined systolic and diastolic congestive heart failure    Echo    Interpretation Summary  · The left ventricle is normal in size with mild eccentric hypertrophy and severely decreased systolic function.  · The estimated ejection fraction is 25%.  · Grade III left ventricular diastolic dysfunction.  · Normal right ventricular size with normal right ventricular systolic function.  · Mild right atrial enlargement.  · Moderate mitral regurgitation.  · Mild to moderate tricuspid regurgitation.  · Mild pulmonic regurgitation.  · Moderate left atrial enlargement.   Creatinine level today a bit higher.. So IV diuretics stopped. Monitor on telemetry.   Monitor strict Is&Os and daily weights.  . Continue to stress to patient importance of self efficacy and  on diet for CHF.  Cardiology consulting.  The plan is [facility] to rule out significant coronary artery disease.  Last BNP reviewed- and noted below   Recent Labs   Lab 03/10/23  0427   *   .      Pneumonia  Was present on hospital day 1.  On day 4 of 5 of azithromycin.  Has improved.      Hyponatremia  Resolved.      Primary hypertension  Controlled.  On multiple meds to control it.  ISMN 30 daily.  Losartan 50 nightly.  Lopressor 25 BID.    Monitor pressures.x      Acute pulmonary edema  Due to CHF.  Will stop IV diuretics in light of the bump in creatinine level.  Monitor volume status.        VTE Risk Mitigation (From admission, onward)         Ordered     enoxaparin injection 40 mg  Every 12 hours          03/07/23 1938     Place BRANDIE hose  Until discontinued         03/07/23 1930     IP VTE HIGH RISK PATIENT  Once         03/07/23 1930     Place sequential compression device  Until discontinued         03/07/23 1930                Discharge Planning   CAROLINE: 3/12/2023     Code Status: Full Code   Is the patient medically ready for discharge?:     Reason for patient still in hospital (select all that apply): Patient trending condition, Laboratory test, Treatment, Imaging and Consult recommendations  Discharge Plan A: Home with family                  Nestor Mosley MD  Department of Hospital Medicine   [facility]

## 2023-03-12 LAB
ALBUMIN SERPL BCP-MCNC: 3.6 G/DL (ref 3.5–5.2)
ALP SERPL-CCNC: 52 U/L (ref 55–135)
ALT SERPL W/O P-5'-P-CCNC: 26 U/L (ref 10–44)
ANION GAP SERPL CALC-SCNC: 10 MMOL/L (ref 8–16)
AST SERPL-CCNC: 21 U/L (ref 10–40)
BASOPHILS # BLD AUTO: 0.05 K/UL (ref 0–0.2)
BASOPHILS NFR BLD: 0.6 % (ref 0–1.9)
BILIRUB SERPL-MCNC: 0.5 MG/DL (ref 0.1–1)
BUN SERPL-MCNC: 19 MG/DL (ref 6–20)
CALCIUM SERPL-MCNC: 9 MG/DL (ref 8.7–10.5)
CHLORIDE SERPL-SCNC: 99 MMOL/L (ref 95–110)
CO2 SERPL-SCNC: 27 MMOL/L (ref 23–29)
CREAT SERPL-MCNC: 1.1 MG/DL (ref 0.5–1.4)
DIFFERENTIAL METHOD: ABNORMAL
EOSINOPHIL # BLD AUTO: 0.2 K/UL (ref 0–0.5)
EOSINOPHIL NFR BLD: 2.5 % (ref 0–8)
ERYTHROCYTE [DISTWIDTH] IN BLOOD BY AUTOMATED COUNT: 14.8 % (ref 11.5–14.5)
EST. GFR  (NO RACE VARIABLE): >60 ML/MIN/1.73 M^2
FERRITIN SERPL-MCNC: 182 NG/ML (ref 20–300)
GLUCOSE SERPL-MCNC: 113 MG/DL (ref 70–110)
HCT VFR BLD AUTO: 37.4 % (ref 40–54)
HGB BLD-MCNC: 12.2 G/DL (ref 14–18)
IMM GRANULOCYTES # BLD AUTO: 0.02 K/UL (ref 0–0.04)
IMM GRANULOCYTES NFR BLD AUTO: 0.2 % (ref 0–0.5)
IRON SERPL-MCNC: 34 UG/DL (ref 45–160)
LYMPHOCYTES # BLD AUTO: 4 K/UL (ref 1–4.8)
LYMPHOCYTES NFR BLD: 44.4 % (ref 18–48)
MAGNESIUM SERPL-MCNC: 2.2 MG/DL (ref 1.6–2.6)
MCH RBC QN AUTO: 25.7 PG (ref 27–31)
MCHC RBC AUTO-ENTMCNC: 32.6 G/DL (ref 32–36)
MCV RBC AUTO: 79 FL (ref 82–98)
MONOCYTES # BLD AUTO: 0.8 K/UL (ref 0.3–1)
MONOCYTES NFR BLD: 9.4 % (ref 4–15)
NEUTROPHILS # BLD AUTO: 3.8 K/UL (ref 1.8–7.7)
NEUTROPHILS NFR BLD: 42.9 % (ref 38–73)
NRBC BLD-RTO: 0 /100 WBC
PHOSPHATE SERPL-MCNC: 4.8 MG/DL (ref 2.7–4.5)
PLATELET # BLD AUTO: 382 K/UL (ref 150–450)
PMV BLD AUTO: 11.5 FL (ref 9.2–12.9)
POTASSIUM SERPL-SCNC: 4.5 MMOL/L (ref 3.5–5.1)
PROT SERPL-MCNC: 7.1 G/DL (ref 6–8.4)
RBC # BLD AUTO: 4.75 M/UL (ref 4.6–6.2)
SATURATED IRON: 15 % (ref 20–50)
SODIUM SERPL-SCNC: 136 MMOL/L (ref 136–145)
TOTAL IRON BINDING CAPACITY: 225 UG/DL (ref 250–450)
TRANSFERRIN SERPL-MCNC: 161 MG/DL (ref 200–375)
WBC # BLD AUTO: 8.9 K/UL (ref 3.9–12.7)

## 2023-03-12 PROCEDURE — 85025 COMPLETE CBC W/AUTO DIFF WBC: CPT | Performed by: NURSE PRACTITIONER

## 2023-03-12 PROCEDURE — 21400001 HC TELEMETRY ROOM

## 2023-03-12 PROCEDURE — 80053 COMPREHEN METABOLIC PANEL: CPT | Performed by: NURSE PRACTITIONER

## 2023-03-12 PROCEDURE — 99233 SBSQ HOSP IP/OBS HIGH 50: CPT | Mod: ,,, | Performed by: INTERNAL MEDICINE

## 2023-03-12 PROCEDURE — 82728 ASSAY OF FERRITIN: CPT | Performed by: STUDENT IN AN ORGANIZED HEALTH CARE EDUCATION/TRAINING PROGRAM

## 2023-03-12 PROCEDURE — 63600175 PHARM REV CODE 636 W HCPCS: Performed by: FAMILY MEDICINE

## 2023-03-12 PROCEDURE — 94761 N-INVAS EAR/PLS OXIMETRY MLT: CPT

## 2023-03-12 PROCEDURE — 27000221 HC OXYGEN, UP TO 24 HOURS

## 2023-03-12 PROCEDURE — 25000242 PHARM REV CODE 250 ALT 637 W/ HCPCS: Performed by: NURSE PRACTITIONER

## 2023-03-12 PROCEDURE — 25000003 PHARM REV CODE 250: Performed by: STUDENT IN AN ORGANIZED HEALTH CARE EDUCATION/TRAINING PROGRAM

## 2023-03-12 PROCEDURE — 99900035 HC TECH TIME PER 15 MIN (STAT)

## 2023-03-12 PROCEDURE — 36415 COLL VENOUS BLD VENIPUNCTURE: CPT | Performed by: NURSE PRACTITIONER

## 2023-03-12 PROCEDURE — 84100 ASSAY OF PHOSPHORUS: CPT | Performed by: NURSE PRACTITIONER

## 2023-03-12 PROCEDURE — 83735 ASSAY OF MAGNESIUM: CPT | Performed by: NURSE PRACTITIONER

## 2023-03-12 PROCEDURE — 25000003 PHARM REV CODE 250: Performed by: NURSE PRACTITIONER

## 2023-03-12 PROCEDURE — 94799 UNLISTED PULMONARY SVC/PX: CPT

## 2023-03-12 PROCEDURE — 99233 PR SUBSEQUENT HOSPITAL CARE,LEVL III: ICD-10-PCS | Mod: ,,, | Performed by: INTERNAL MEDICINE

## 2023-03-12 PROCEDURE — 99900031 HC PATIENT EDUCATION (STAT)

## 2023-03-12 PROCEDURE — 25000003 PHARM REV CODE 250: Performed by: HOSPITALIST

## 2023-03-12 PROCEDURE — 84466 ASSAY OF TRANSFERRIN: CPT | Performed by: STUDENT IN AN ORGANIZED HEALTH CARE EDUCATION/TRAINING PROGRAM

## 2023-03-12 PROCEDURE — 94640 AIRWAY INHALATION TREATMENT: CPT

## 2023-03-12 RX ORDER — LEVALBUTEROL INHALATION SOLUTION 0.63 MG/3ML
0.63 SOLUTION RESPIRATORY (INHALATION) EVERY 8 HOURS PRN
Status: DISCONTINUED | OUTPATIENT
Start: 2023-03-12 | End: 2023-03-16 | Stop reason: HOSPADM

## 2023-03-12 RX ADMIN — CALCIUM CARBONATE (ANTACID) CHEW TAB 500 MG 500 MG: 500 CHEW TAB at 05:03

## 2023-03-12 RX ADMIN — MAGNESIUM OXIDE 400 MG (241.3 MG MAGNESIUM) TABLET 400 MG: at 05:03

## 2023-03-12 RX ADMIN — ENOXAPARIN SODIUM 40 MG: 100 INJECTION SUBCUTANEOUS at 09:03

## 2023-03-12 RX ADMIN — SACUBITRIL AND VALSARTAN 1 TABLET: 24; 26 TABLET, FILM COATED ORAL at 09:03

## 2023-03-12 RX ADMIN — ISOSORBIDE MONONITRATE 30 MG: 30 TABLET, EXTENDED RELEASE ORAL at 09:03

## 2023-03-12 RX ADMIN — METOPROLOL TARTRATE 25 MG: 25 TABLET, FILM COATED ORAL at 08:03

## 2023-03-12 RX ADMIN — FAMOTIDINE 20 MG: 20 TABLET ORAL at 08:03

## 2023-03-12 RX ADMIN — SPIRONOLACTONE 25 MG: 25 TABLET, FILM COATED ORAL at 09:03

## 2023-03-12 RX ADMIN — SACUBITRIL AND VALSARTAN 1 TABLET: 24; 26 TABLET, FILM COATED ORAL at 08:03

## 2023-03-12 RX ADMIN — FAMOTIDINE 20 MG: 20 TABLET ORAL at 09:03

## 2023-03-12 RX ADMIN — CALCIUM CARBONATE (ANTACID) CHEW TAB 500 MG 500 MG: 500 CHEW TAB at 09:03

## 2023-03-12 RX ADMIN — METOPROLOL TARTRATE 25 MG: 25 TABLET, FILM COATED ORAL at 09:03

## 2023-03-12 RX ADMIN — POTASSIUM CHLORIDE 20 MEQ: 1500 TABLET, EXTENDED RELEASE ORAL at 06:03

## 2023-03-12 RX ADMIN — POTASSIUM CHLORIDE 20 MEQ: 1500 TABLET, EXTENDED RELEASE ORAL at 05:03

## 2023-03-12 RX ADMIN — LEVALBUTEROL HYDROCHLORIDE 0.63 MG: 0.63 SOLUTION RESPIRATORY (INHALATION) at 01:03

## 2023-03-12 RX ADMIN — CALCIUM CARBONATE (ANTACID) CHEW TAB 500 MG 500 MG: 500 CHEW TAB at 12:03

## 2023-03-12 RX ADMIN — ATORVASTATIN CALCIUM 20 MG: 20 TABLET, FILM COATED ORAL at 08:03

## 2023-03-12 RX ADMIN — LEVALBUTEROL HYDROCHLORIDE 0.63 MG: 0.63 SOLUTION RESPIRATORY (INHALATION) at 09:03

## 2023-03-12 RX ADMIN — MAGNESIUM OXIDE 400 MG (241.3 MG MAGNESIUM) TABLET 400 MG: at 06:03

## 2023-03-12 RX ADMIN — ASPIRIN 81 MG CHEWABLE TABLET 324 MG: 81 TABLET CHEWABLE at 09:03

## 2023-03-12 NOTE — PLAN OF CARE
Problem: Adult Inpatient Plan of Care  Goal: Plan of Care Review  Outcome: Ongoing, Progressing  Goal: Optimal Comfort and Wellbeing  Outcome: Ongoing, Progressing     POC reviewed with pt.  Pt aware of NPO status at midnight.  Pt denies needs.  Call light within reach.

## 2023-03-12 NOTE — PROGRESS NOTES
"[facility]  Progress Note    Patient Name: Wenceslao Jeong  MRN: [id number]  Patient Class: IP- Inpatient   Admission Date: 3/7/2023  Length of Stay: 4 days  Attending Physician: Andrea Corrales MD  Primary Care Provider: BELKYS Wheeler        Subjective:     Principal Problem:Acute combined systolic and diastolic congestive heart failure        HPI:  Wecneslao Jeong is a 27 y.o. male with a history as  has no past medical history on file. who presented to the ED with a Chest Pain (Acute onset of chest pain during exertion. Allevaited with rest. Pt reports hx of MEJIA. Hx of HTN. )    Patient presents to the ED with a c/o SOB for about 3 weeks worsening on last 2-3 days and now with cough with pink foamy sputum. Patient reports he was seen by his PCP because he had been SOB and was told he did not have pneumonia, however was started on BP medications (losartan 50 mg daily) February 1, 2023.  Patient further states he was supposed to follow up with PCP to see if the losartan was working but he missed the appointment because his job was short and he had to fill in.     Denies fever, chills, diaphoresis, dizziness, HA, chest pain, palpitations, NVD, recent trauma or any other associated symptoms. No aggravating and alleviating factor.  Does not smoke cigarettes, drink or do illegal drugs.     Lab and imaging obtained and reviewed. CBC shows H/H 12.6/37.1 MCV 77 MCH 26.3 RDW 14.9. CMP shows Na+ 133 AG 7 glucose 112 Ca+ 8.4 T. Bili 1.7. . Initial Troponin 25.9. EKG shows sinus tachycardia; with possible left atrial enlargement. CXR shows right mid and lower lung pneumonia. On admit, temp 99.3 Hr 114 RR 16 /110 sats 95% on RA         Per ED provider, patient presented with SOB and productive cough "pink frothy sputum". CXR was completed and showed right mid/lower PNA. Started on IV ABX. However, BNP was elevated and given symptom, a bedside Echo was completed which per ED " "provider more consistent with HF with EF approx 50%. BP significantly elevated tridil gtt initiated, with good BP controlled, tridil gtt discontinued. Patient was also noted to have a decrease in sats to 91% on 5 liters O2, placed on Bipap with O2 sats improving, now currently on 2L o2 per NC with O2 sats 96% on assessment.                Overview/Hospital Course:  Wenceslao Jeong is a 27 year old male with a past medical history of obesity and HTN who presented with shortness of breath, chest pain, and productive cough secondary to a new onset combined congestive heart failure in the setting of hypertensive emergency with demand ischemia. His blood pressure was able to be controlled with a nitrate infusion transitioned to PO medications. He symptoms also improved with IV diuretics. Cardiology was consulted. TTE showed EF of 25% with grade III diastolic dysfunction as well as MR and TR. He is currently stable on room air and off diuretics. Entresto, Aldactone, isosorbide mononitrate and metoprolol have been started. He is pending coronary angiogram 3/13.      Interval History: see "Hospital Course"    Review of Systems  Objective:     Vital Signs (Most Recent):  Temp: 98 °F (36.7 °C) (03/11/23 1600)  Pulse: 92 (03/11/23 1600)  Resp: 18 (03/11/23 1600)  BP: 109/72 (03/11/23 1600)  SpO2: 97 % (03/11/23 1600) Vital Signs (24h Range):  Temp:  [97.3 °F (36.3 °C)-98.5 °F (36.9 °C)] 98 °F (36.7 °C)  Pulse:  [79-97] 92  Resp:  [15-18] 18  SpO2:  [95 %-98 %] 97 %  BP: (109-122)/(62-74) 109/72     Weight: 131.5 kg (289 lb 14.5 oz)  Body mass index is 44.09 kg/m².    Intake/Output Summary (Last 24 hours) at 3/11/2023 1839  Last data filed at 3/11/2023 1742  Gross per 24 hour   Intake 1010 ml   Output 550 ml   Net 460 ml      Physical Exam  Vitals and nursing note reviewed.   Constitutional:       Appearance: He is obese.   HENT:      Head: Normocephalic and atraumatic.      Right Ear: External ear normal.      Left Ear: " External ear normal.      Nose: Nose normal.      Mouth/Throat:      Mouth: Mucous membranes are moist.      Pharynx: Oropharynx is clear.   Eyes:      Extraocular Movements: Extraocular movements intact.      Conjunctiva/sclera: Conjunctivae normal.   Cardiovascular:      Rate and Rhythm: Normal rate and regular rhythm.      Pulses: Normal pulses.      Heart sounds: Normal heart sounds. No murmur heard.  Pulmonary:      Effort: Pulmonary effort is normal.      Breath sounds: Normal breath sounds.   Abdominal:      General: Bowel sounds are normal.      Palpations: Abdomen is soft.   Musculoskeletal:         General: Normal range of motion.      Cervical back: Normal range of motion and neck supple.      Right lower leg: No edema.      Left lower leg: No edema.   Skin:     General: Skin is warm and dry.   Neurological:      General: No focal deficit present.      Mental Status: He is oriented to person, place, and time. Mental status is at baseline.   Psychiatric:         Mood and Affect: Mood normal.         Behavior: Behavior normal.       Significant Labs: All pertinent labs within the past 24 hours have been reviewed.    Significant Imaging: I have reviewed all pertinent imaging results/findings within the past 24 hours.      Assessment/Plan:      * Acute combined systolic and diastolic congestive heart failure  Unclear etiology. May be related to patient's uncontrolled HTN.  -Continue Entresto, metoprolol and Aldcatone  -Telemetry  -Cardiology consulted  -Strict I's and O's  -Keep K > 4 and Mg > 2  -Angiogram planned for 3/13      Microcytic anemia  -Check iron studies  -Trend Hgb with CBC      Severe obesity (BMI >= 40)  Body mass index is 44.09 kg/m². Morbid obesity complicates all aspects of disease management from diagnostic modalities to treatment.        HTN (hypertension)  Controlled with nitrate, BB, Entresto and Aldactone.  -Patient may benefit from secondary HTN workup; consider renal artery  ultrasound with Doppler as well as renin/angiotensin ration off Aldactone  -Continue to monitor        VTE Risk Mitigation (From admission, onward)         Ordered     enoxaparin injection 40 mg  Every 12 hours         03/07/23 1938     Place BRANDIE hose  Until discontinued         03/07/23 1930     IP VTE HIGH RISK PATIENT  Once         03/07/23 1930     Place sequential compression device  Until discontinued         03/07/23 1930                Discharge Planning   CAROLINE: 3/12/2023     Code Status: Full Code   Is the patient medically ready for discharge?:     Reason for patient still in hospital (select all that apply): Patient trending condition, Imaging and Consult recommendations  Discharge Plan A: Home with family                  Andrea Corrales MD  Department of Hospital Medicine   [facility]

## 2023-03-12 NOTE — ASSESSMENT & PLAN NOTE
Unclear etiology. May be related to patient's uncontrolled HTN.  -Continue Entresto, metoprolol and Aldcatone  -Telemetry  -Cardiology consulted  -Strict I's and O's  -Keep K > 4 and Mg > 2  -Angiogram planned for 3/13

## 2023-03-12 NOTE — ASSESSMENT & PLAN NOTE
Ferritin within normal limits  -Trend Hgb with CBC  -May need Hgb electrophoresis in the outpatient setting

## 2023-03-12 NOTE — ASSESSMENT & PLAN NOTE
Body mass index is 44.09 kg/m². Morbid obesity complicates all aspects of disease management from diagnostic modalities to treatment.

## 2023-03-12 NOTE — CARE UPDATE
03/12/23 0137   Patient Assessment/Suction   Level of Consciousness (AVPU) alert   Respiratory Effort Unlabored   Expansion/Accessory Muscles/Retractions no use of accessory muscles   All Lung Fields Breath Sounds clear;diminished   Rhythm/Pattern, Respiratory assisted mechanically   Cough Frequency no cough   PRE-TX-O2   Device (Oxygen Therapy) BIPAP   $ Is the patient on Low Flow Oxygen? Yes   Oxygen Concentration (%) 21   SpO2 96 %   Pulse Oximetry Type Intermittent   $ Pulse Oximetry - Multiple Charge Pulse Oximetry - Multiple   Pulse 85   Resp 18   Positioning   Head of Bed (HOB) Positioning HOB elevated;HOB at 30 degrees   Aerosol Therapy   $ Aerosol Therapy Charges Aerosol Treatment   Daily Review of Necessity (SVN) completed   Respiratory Treatment Status (SVN) given   Treatment Route (SVN) in-line   Patient Position (SVN) semi-Garner's   Post Treatment Assessment (SVN) breath sounds improved   Signs of Intolerance (SVN) none   Breath Sounds Post-Respiratory Treatment   Throughout All Fields Post-Treatment All Fields   Throughout All Fields Post-Treatment aeration increased   Post-treatment Heart Rate (beats/min) 78   Post-treatment Resp Rate (breaths/min) 20   Ready to Wean/Extubation Screen   FIO2<=50 (chart decimal) 0.21   Preset CPAP/BiPAP Settings   Mode Of Delivery BiPAP S/T   $ Is patient using? Yes   Size of Mask Medium/Large   Sized Appropriately? Yes   Equipment Type V60   Airway Device Type medium full face mask   Humidifier not applicable   Ipap 16   EPAP (cm H2O) 10   Pressure Support (cm H2O) 6   Set Rate (Breaths/Min) 18   ITime (sec) 0.95   Rise Time (sec) 3   Patient CPAP/BiPAP Settings   FiO2 Auto Set yes   RR Total (Breaths/Min) 20   Tidal Volume (mL) 489   VE Minute Ventilation (L/min) 10 L/min   Peak Inspiratory Pressure (cm H2O) 15   TiTOT (%) 30   Total Leak (L/Min) 19   Patient Trigger - ST Mode Only (%) 96

## 2023-03-12 NOTE — ASSESSMENT & PLAN NOTE
Patient has a current diagnosis of hypertensive urgency (without evidence of end organ damage) which is controlled.  Latest blood pressure and vitals reviewed-   Temp:  [97.3 °F (36.3 °C)-98.1 °F (36.7 °C)]   Pulse:  []   Resp:  [12-20]   BP: (109-143)/(72-89)   SpO2:  [96 %-99 %] .   Patient currently off IV antihypertensives.   Home meds for hypertension were reviewed and noted below.   Hypertension Medications             losartan (COZAAR) 50 MG tablet Take 50 mg by mouth once daily.          Medication adjustment for hospital antihypertensives is as follows- Losartan discontinued (see above), started on CCB and diuretic.     Will aim for controlled BP reduction by medications noted above. Monitor and mitigate end organ damage as indicated.

## 2023-03-12 NOTE — ASSESSMENT & PLAN NOTE
Patient with Hypoxic Respiratory failure which is Acute.  he is not on home oxygen. Supplemental oxygen was provided and noted- Oxygen Concentration (%):  [21] 21.   Signs/symptoms of respiratory failure include- respiratory distress. Contributing diagnoses includes - Pneumonia +/- acute pulmonary edema.  Labs and images were reviewed. Patient Has recent ABG, which has been reviewed. Will treat underlying causes and adjust management of respiratory failure as follows- Bipap PRN  O2 PRN - keep sats >93%, Pulse oximetry q 4 with vital signs  Nebulizer treatments q8 hours   IS q2 while awake

## 2023-03-12 NOTE — SUBJECTIVE & OBJECTIVE
"Interval History: see "Hospital Course"    Review of Systems   Respiratory:  Negative for shortness of breath.    Cardiovascular:  Negative for chest pain, palpitations and leg swelling.   Objective:     Vital Signs (Most Recent):  Temp: 97.3 °F (36.3 °C) (03/12/23 1100)  Pulse: 88 (03/12/23 1100)  Resp: 18 (03/12/23 1100)  BP: 139/89 (03/12/23 1100)  SpO2: 99 % (03/12/23 1100) Vital Signs (24h Range):  Temp:  [97.3 °F (36.3 °C)-98.1 °F (36.7 °C)] 97.3 °F (36.3 °C)  Pulse:  [] 88  Resp:  [12-20] 18  SpO2:  [96 %-99 %] 99 %  BP: (109-143)/(72-89) 139/89     Weight: 131.5 kg (289 lb 14.5 oz)  Body mass index is 44.09 kg/m².    Intake/Output Summary (Last 24 hours) at 3/12/2023 1414  Last data filed at 3/12/2023 0830  Gross per 24 hour   Intake 740 ml   Output 550 ml   Net 190 ml      Physical Exam  Vitals and nursing note reviewed.   Constitutional:       Appearance: He is obese.   HENT:      Head: Normocephalic and atraumatic.      Right Ear: External ear normal.      Left Ear: External ear normal.      Nose: Nose normal.      Mouth/Throat:      Mouth: Mucous membranes are moist.      Pharynx: Oropharynx is clear.   Eyes:      Extraocular Movements: Extraocular movements intact.      Conjunctiva/sclera: Conjunctivae normal.   Cardiovascular:      Rate and Rhythm: Normal rate and regular rhythm.      Pulses: Normal pulses.      Heart sounds: Normal heart sounds. No murmur heard.  Pulmonary:      Effort: Pulmonary effort is normal.      Breath sounds: Normal breath sounds.   Abdominal:      General: Bowel sounds are normal.      Palpations: Abdomen is soft.   Musculoskeletal:         General: Normal range of motion.      Cervical back: Normal range of motion and neck supple.      Right lower leg: No edema.      Left lower leg: No edema.   Skin:     General: Skin is warm and dry.   Neurological:      General: No focal deficit present.      Mental Status: He is oriented to person, place, and time. Mental status is " at baseline.   Psychiatric:         Mood and Affect: Mood normal.         Behavior: Behavior normal.       Significant Labs: All pertinent labs within the past 24 hours have been reviewed.    Significant Imaging: I have reviewed all pertinent imaging results/findings within the past 24 hours.

## 2023-03-12 NOTE — SUBJECTIVE & OBJECTIVE
"Interval History: see "Hospital Course"    Review of Systems  Objective:     Vital Signs (Most Recent):  Temp: 98 °F (36.7 °C) (03/11/23 1600)  Pulse: 92 (03/11/23 1600)  Resp: 18 (03/11/23 1600)  BP: 109/72 (03/11/23 1600)  SpO2: 97 % (03/11/23 1600) Vital Signs (24h Range):  Temp:  [97.3 °F (36.3 °C)-98.5 °F (36.9 °C)] 98 °F (36.7 °C)  Pulse:  [79-97] 92  Resp:  [15-18] 18  SpO2:  [95 %-98 %] 97 %  BP: (109-122)/(62-74) 109/72     Weight: 131.5 kg (289 lb 14.5 oz)  Body mass index is 44.09 kg/m².    Intake/Output Summary (Last 24 hours) at 3/11/2023 1839  Last data filed at 3/11/2023 1742  Gross per 24 hour   Intake 1010 ml   Output 550 ml   Net 460 ml      Physical Exam  Vitals and nursing note reviewed.   Constitutional:       Appearance: He is obese.   HENT:      Head: Normocephalic and atraumatic.      Right Ear: External ear normal.      Left Ear: External ear normal.      Nose: Nose normal.      Mouth/Throat:      Mouth: Mucous membranes are moist.      Pharynx: Oropharynx is clear.   Eyes:      Extraocular Movements: Extraocular movements intact.      Conjunctiva/sclera: Conjunctivae normal.   Cardiovascular:      Rate and Rhythm: Normal rate and regular rhythm.      Pulses: Normal pulses.      Heart sounds: Normal heart sounds. No murmur heard.  Pulmonary:      Effort: Pulmonary effort is normal.      Breath sounds: Normal breath sounds.   Abdominal:      General: Bowel sounds are normal.      Palpations: Abdomen is soft.   Musculoskeletal:         General: Normal range of motion.      Cervical back: Normal range of motion and neck supple.      Right lower leg: No edema.      Left lower leg: No edema.   Skin:     General: Skin is warm and dry.   Neurological:      General: No focal deficit present.      Mental Status: He is oriented to person, place, and time. Mental status is at baseline.   Psychiatric:         Mood and Affect: Mood normal.         Behavior: Behavior normal.       Significant Labs: All " pertinent labs within the past 24 hours have been reviewed.    Significant Imaging: I have reviewed all pertinent imaging results/findings within the past 24 hours.

## 2023-03-12 NOTE — ASSESSMENT & PLAN NOTE
Controlled with nitrate, BB, Entresto and Aldactone.  -Patient may benefit from secondary HTN workup; consider renal artery ultrasound with Doppler as well as renin/angiotensin ration off Aldactone  -Continue to monitor

## 2023-03-12 NOTE — PROGRESS NOTES
[facility]  Department of Cardiology  Progress Note      PATIENT NAME: Wenceslao Jeong  MRN: [id number]  TODAY'S DATE: 03/12/2023  ADMIT DATE: 3/7/2023                          CONSULT REQUESTED BY: Andrea Corrales MD    SUBJECTIVE     PRINCIPAL PROBLEM: Acute combined systolic and diastolic congestive heart failure      REASON FOR CONSULT:    Newly diagnosed systolic HF      INTERVAL HISTORY:  3/12/23:  Pt resting in chair, ambulatory in room; denies dyspnea, chest pain, fluid retention; Breathing comfortably on room air; NSR on tele w/o acute events overnight; Creatinine 1.1    3/11/23:  Pt OOB in chair; denies dyspnea or cough; edema improved;   Creatinine improved today, 1.2; VSS, NSR on telemetry w/o any acute events overnight    3/10/23  Patient is resting comfortably during examination.  No acute distress.  Alert and oriented.  Vital signs stable.  Patient was supposed to have angiogram this morning.  However, creatinine increased to 1.5 overnight.  At this time we will postpone angiogram to Monday morning in view of LISA.      HPI:     Patient is a 27-year-old male with past medical history of hypertension who presented to the ED with acute onset of chest pain and shortness of breath with exertion, alleviated with rest for the past 3 weeks, worsening over the past 2-3 days.  Patient also reports a cough with pink foamy sputum.  Patient was recently started on losartan 50 mg daily on February 1, 2023 by his PCP.  Patient denies fever, chills, diaphoresis, lightheadedness, dizziness, palpitations, edema or bleeding.  Chest x-ray shows right middle and lower lung pneumonia.  CT of the chest also shows multifocal pneumonia.  Blood pressure significantly elevated in ED and patient was started on a Tridil drip.  , echocardiogram performed with EF of 25% and grade 3 diastolic dysfunction, moderate MR/TR.  Patient was also found to be hypoxic and placed on 5 L and subsequently placed on BiPAP for  "worsening dyspnea.    During examination, patient is alert and oriented, on room air, no acute distress, sinus rhythm on telemetry.  Patient states he is feeling much better and denies active chest pain or shortness of breath.      FROM H&P:  HPI: Wenceslao Jeong is a 27 y.o. male with a history as  has no past medical history on file. who presented to the ED with a Chest Pain (Acute onset of chest pain during exertion. Allevaited with rest. Pt reports hx of MEJIA. Hx of HTN. )     Patient presents to the ED with a c/o SOB for about 3 weeks worsening on last 2-3 days and now with cough with pink foamy sputum. Patient reports he was seen by his PCP because he had been SOB and was told he did not have pneumonia, however was started on BP medications (losartan 50 mg daily) February 1, 2023.  Patient further states he was supposed to follow up with PCP to see if the losartan was working but he missed the appointment because his job was short and he had to fill in.      Denies fever, chills, diaphoresis, dizziness, HA, chest pain, palpitations, NVD, recent trauma or any other associated symptoms. No aggravating and alleviating factor.  Does not smoke cigarettes, drink or do illegal drugs.      Lab and imaging obtained and reviewed. CBC shows H/H 12.6/37.1 MCV 77 MCH 26.3 RDW 14.9. CMP shows Na+ 133 AG 7 glucose 112 Ca+ 8.4 T. Bili 1.7. . Initial Troponin 25.9. EKG shows sinus tachycardia; with possible left atrial enlargement. CXR shows right mid and lower lung pneumonia. On admit, temp 99.3 Hr 114 RR 16 /110 sats 95% on RA           Per ED provider, patient presented with SOB and productive cough "pink frothy sputum". CXR was completed and showed right mid/lower PNA. Started on IV ABX. However, BNP was elevated and given symptom, a bedside Echo was completed which per ED provider more consistent with HF with EF approx 50%. BP significantly elevated tridil gtt initiated, with good BP controlled, tridil gtt " discontinued. Patient was also noted to have a decrease in sats to 91% on 5 liters O2, placed on Bipap with O2 sats improving, now currently on 2L o2 per NC with O2 sats 96% on assessment.         Review of patient's allergies indicates:  No Known Allergies    No past medical history on file.  No past surgical history on file.        REVIEW OF SYSTEMS  Negative except as mentioned above  OBJECTIVE     VITAL SIGNS (Most Recent)  Temp: 97.9 °F (36.6 °C) (03/12/23 0740)  Pulse: 80 (03/12/23 0430)  Resp: 18 (03/12/23 0740)  BP: (!) 143/88 (03/12/23 0740)  SpO2: 99 % (03/12/23 0740)    VENTILATION STATUS  Resp: 18 (03/12/23 0740)  SpO2: 99 % (03/12/23 0740)  Oxygen Concentration (%):  [21] 21    I & O (Last 24H):  Intake/Output Summary (Last 24 hours) at 3/12/2023 0928  Last data filed at 3/11/2023 1742  Gross per 24 hour   Intake 860 ml   Output 550 ml   Net 310 ml         WEIGHTS  Wt Readings from Last 3 Encounters:   03/11/23 0332 131.5 kg (289 lb 14.5 oz)   03/10/23 0555 129.9 kg (286 lb 6 oz)   03/09/23 0417 130.6 kg (287 lb 14.7 oz)   03/07/23 1458 (!) 138.3 kg (305 lb)   03/08/23 1353 (!) 138.3 kg (305 lb)       PHYSICAL EXAM  GENERAL: well built, obese male OOB in chair, breathing comfortably in no apparent distress alert and oriented.   HEENT: Normocephalic. Pupils normal and conjunctivae normal.    NECK: No JVD. No bruit..   THYROID: Thyroid not examined  CARDIAC: Regular rate and rhythm. S1 is normal.S2 is normal.No gallops, clicks or murmurs noted at this time.  CHEST ANATOMY: normal.   LUNGS: Clear to auscultation. No wheezing or rhonchi..   ABDOMEN: Soft. Normal bowel sounds.  Nontender  URINARY: No serna catheter   EXTREMITIES: No cyanosis, clubbing or edema noted at this time.  CENTRAL NERVOUS SYSTEM: No focal motor or sensory deficits noted.   SKIN: Skin without lesions, moist, well perfused.   MUSCLE STRENGTH & TONE: No noteable weakness, atrophy or abnormal movement.     HOME MEDICATIONS:  No current  facility-administered medications on file prior to encounter.     Current Outpatient Medications on File Prior to Encounter   Medication Sig Dispense Refill    albuterol (PROVENTIL/VENTOLIN HFA) 90 mcg/actuation inhaler Inhale 2 puffs into the lungs every 4 (four) hours as needed.      losartan (COZAAR) 50 MG tablet Take 50 mg by mouth once daily.         SCHEDULED MEDS:   aspirin  324 mg Oral Daily    atorvastatin  20 mg Oral QHS    calcium carbonate  500 mg Oral TID WM    enoxaparin  40 mg Subcutaneous Q12H    famotidine  20 mg Oral BID    isosorbide mononitrate  30 mg Oral Daily    magnesium oxide  400 mg Oral BID AC    metoprolol tartrate  25 mg Oral BID    potassium chloride  20 mEq Oral BID AC    sacubitriL-valsartan  1 tablet Oral BID    spironolactone  25 mg Oral Daily       CONTINUOUS INFUSIONS:        PRN MEDS:benzonatate, dextrose 10%, dextrose 10%, glucagon (human recombinant), glucose, glucose, hydrALAZINE, ibuprofen, levalbuterol, magnesium oxide, magnesium oxide, melatonin, naloxone, potassium bicarbonate, potassium bicarbonate, potassium bicarbonate, potassium, sodium phosphates, potassium, sodium phosphates, potassium, sodium phosphates, sodium chloride 0.9%, sodium chloride 0.9%    LABS AND DIAGNOSTICS     CBC LAST 3 DAYS  Recent Labs   Lab 03/10/23  0427 03/11/23  0424 03/12/23  0439   WBC 7.88 8.86 8.90   RBC 4.77 4.93 4.75   HGB 12.5* 12.9* 12.2*   HCT 36.8* 38.5* 37.4*   MCV 77* 78* 79*   MCH 26.2* 26.2* 25.7*   MCHC 34.0 33.5 32.6   RDW 15.1* 15.1* 14.8*    383 382   MPV 11.3 11.4 11.5   GRAN 50.0  4.0 48.4  4.3 42.9  3.8   LYMPH 37.2  2.9 39.7  3.5 44.4  4.0   MONO 9.4  0.7 8.1  0.7 9.4  0.8   BASO 0.06 0.07 0.05   NRBC 0 0 0         COAGULATION LAST 3 DAYS  No results for input(s): LABPT, INR, APTT in the last 168 hours.    CHEMISTRY LAST 3 DAYS  Recent Labs   Lab 03/07/23  1734 03/08/23  0446 03/10/23  0427 03/11/23  0424 03/12/23  0439   NA  --    < > 139  138 136 136   K   --    < > 4.0  4.0 4.0 4.5   CL  --    < > 101  99 99 99   CO2  --    < > 29  28 27 27   ANIONGAP  --    < > 9  11 10 10   BUN  --    < > 23*  22* 21* 19   CREATININE  --    < > 1.4  1.5* 1.2 1.1   GLU  --    < > 119*  121* 103 113*   CALCIUM  --    < > 8.9  8.8 8.9 9.0   PH 7.393  --   --   --   --    MG  --    < > 2.3 2.2 2.2   ALBUMIN  --    < > 3.4* 3.7 3.6   PROT  --    < > 7.1 7.8 7.1   ALKPHOS  --    < > 59 57 52*   ALT  --    < > 24 26 26   AST  --    < > 18 21 21   BILITOT  --    < > 0.7 0.9 0.5    < > = values in this interval not displayed.         CARDIAC PROFILE LAST 3 DAYS  Recent Labs   Lab 03/07/23  1528 03/07/23 1941 03/08/23  0446 03/10/23  0427   *  --   --  135*   *  --   --   --    TROPONINIHS 25.9* 25.3* 27.1*  --          ENDOCRINE LAST 3 DAYS  Recent Labs   Lab 03/07/23 1941 03/08/23  0446   TSH  --  1.300   PROCAL 0.05  --          LAST ARTERIAL BLOOD GAS  ABG  Recent Labs   Lab 03/07/23  1734   PH 7.393   PO2 50*   PCO2 39.8   HCO3 24.3   BE -1         LAST 7 DAYS MICROBIOLOGY   Microbiology Results (last 7 days)       ** No results found for the last 168 hours. **            MOST RECENT IMAGING  Echo  Addendum: · The left ventricle is normal in size with mild eccentric hypertrophy and  severely decreased systolic function.   · The estimated ejection fraction is 25%.   · Grade III left ventricular diastolic dysfunction.   · Normal right ventricular size with normal right ventricular systolic  function.   · Mild right atrial enlargement.   · Moderate mitral regurgitation.   · Mild to moderate tricuspid regurgitation.   · Mild pulmonic regurgitation.   · Moderate left atrial enlargement.  Narrative: · The left ventricle is normal in size with mild eccentric hypertrophy and   mildly decreased systolic function.  · The estimated ejection fraction is 45%.  · Grade III left ventricular diastolic dysfunction.  · Normal right ventricular size with normal right ventricular  systolic   function.  · Moderate left atrial enlargement.  · Mild right atrial enlargement.  · Moderate mitral regurgitation.  · Mild to moderate tricuspid regurgitation.  · Mild pulmonic regurgitation.     CTA Chest Non-Coronary (PE Studies)  CMS MANDATED QUALITY DATA-CT RADIATION DOSE-436  All CT scans at this facility use dose modulation, iterative reconstruction, and or weight-based dosing when appropriate to reduce radiation dose to as low as reasonably achievable.    HISTORY: Pulmonary embolism (PE) suspected, unknown D-dimer    FINDINGS: Thin axial imaging through the chest was performed with 100 mL Omnipaque 350 IV contrast, with sagittal and coronal reformatted images and MIP reconstructions performed, and images stored in the patient's permanent electronic medical record.    Comparison to the chest radiograph of the prior day. There are no pulmonary arterial filling defects to suggest pulmonary thromboembolism. The central pulmonary arteries are normal in caliber.    The aorta is normal in caliber and tapers appropriately. The heart is enlarged, with dilated left ventricle. There is no pericardial effusion, with no enlarged mediastinal or hilar lymph nodes.    There are patchy airspace opacities and groundglass densities in both lungs, most pronounced in the right lower lobe with some air bronchograms, suspicious for multifocal pneumonia. There is no evidence of interstitial pulmonary edema, with no pleural effusion. The central airways are patent, with no pneumothorax.    Images of the upper abdomen are unremarkable. There are no acute fractures or destructive osseous lesions.    IMPRESSION:  1. Negative for pulmonary thromboembolism.  2. Scattered interstitial and airspace opacities in both lungs, suspicious for multifocal pneumonia. Viral or atypical pneumonia could have this appearance.  3. Cardiomegaly with dilated left ventricle.    Electronically signed by:  Darrin Roland MD  3/8/2023 11:38 AM CST  Workstation: [id number]      ECHOCARDIOGRAM RESULTS (last 5)  Results for orders placed during the hospital encounter of 03/07/23    Echo    Interpretation Summary  · The left ventricle is normal in size with mild eccentric hypertrophy and severely decreased systolic function.  · The estimated ejection fraction is 25%.  · Grade III left ventricular diastolic dysfunction.  · Normal right ventricular size with normal right ventricular systolic function.  · Mild right atrial enlargement.  · Moderate mitral regurgitation.  · Mild to moderate tricuspid regurgitation.  · Mild pulmonic regurgitation.  · Moderate left atrial enlargement.      CURRENT/PREVIOUS VISIT EKG  Results for orders placed or performed during the hospital encounter of 03/07/23   EKG 12-lead    Collection Time: 03/08/23  7:46 AM    Narrative    Test Reason : R94.31,    Vent. Rate : 104 BPM     Atrial Rate : 104 BPM     P-R Int : 162 ms          QRS Dur : 088 ms      QT Int : 356 ms       P-R-T Axes : 048 026 066 degrees     QTc Int : 468 ms    Sinus tachycardia  Possible Left atrial enlargement  Nonspecific T wave abnormality  Abnormal ECG  When compared with ECG of 07-MAR-2023 15:08,  Nonspecific T wave abnormality now evident in Inferior leads    Referred By: [email or web address]   SELF           Confirmed By:            ASSESSMENT/PLAN:     Active Hospital Problems    Diagnosis    *Acute combined systolic and diastolic congestive heart failure    Severe obesity (BMI >= 40)    Microcytic anemia    HTN (hypertension)       ASSESSMENT & PLAN:     New onset combined systolic and diastolic heart failure  Acute pulmonary edema  Hypertensive urgency  Acute respiratory failure with hypoxia  Right middle/lower lobe pneumonia  Elevated troponin    RECOMMENDATIONS:    Patient presented with hypertensive emergency and new onset combined systolic and diastolic heart failure- EF of 25% with grade 3 diastolic dysfunction as well as moderate MR/TR.      Patient had downtrend  in BNP and weight loss of 16 lbs since 3/8/23. Appears euvolemic. Continue daily weights, strict I&Os, 1.5 L fluid restriction.  Low-sodium heart healthy diet.    Blood pressure has remained at goal .  Continue metoprolol tartrate 25 mg b.i.d., isosorbide mononitrate 30 mg daily, spironolactone 25 mg daily and Entresto 24-26 mg BID.  May increase Entresto dosage prior to discharge or outpatient. Hold antihypertensives for systolic BP less than 100.    Angiogram planned for Monday to evaluate for coronary blockages. Hold Lovenox tonight.     6. Patient will need life vest post angiogram tomorrow. [employer] representative notified. Orders for device will be placed tomorrow after angiogram.           Serena Bird NP  [facility]  Department of Cardiology  Date of Service: 03/12/2023      I have personally interviewed and examined the patient, I have reviewed the Nurse Practitioner's history and physical, assessment, and plan. I have personally evaluated the patient at bedside and agree with the findings and made appropriate changes as necessary in recommendations.    Barrett Fisher MD  Department of Cardiology  [facility]  3/12/23

## 2023-03-12 NOTE — PROGRESS NOTES
"[facility]  Progress Note    Patient Name: Wenceslao Jeong  MRN: [id number]  Patient Class: IP- Inpatient   Admission Date: 3/7/2023  Length of Stay: 5 days  Attending Physician: Andrea Corrales MD  Primary Care Provider: BELKYS Wheeler        Subjective:     Principal Problem:Acute combined systolic and diastolic congestive heart failure        HPI:  Wenceslao Jeong is a 27 y.o. male with a history as  has no past medical history on file. who presented to the ED with a Chest Pain (Acute onset of chest pain during exertion. Allevaited with rest. Pt reports hx of MEJIA. Hx of HTN. )    Patient presents to the ED with a c/o SOB for about 3 weeks worsening on last 2-3 days and now with cough with pink foamy sputum. Patient reports he was seen by his PCP because he had been SOB and was told he did not have pneumonia, however was started on BP medications (losartan 50 mg daily) February 1, 2023.  Patient further states he was supposed to follow up with PCP to see if the losartan was working but he missed the appointment because his job was short and he had to fill in.     Denies fever, chills, diaphoresis, dizziness, HA, chest pain, palpitations, NVD, recent trauma or any other associated symptoms. No aggravating and alleviating factor.  Does not smoke cigarettes, drink or do illegal drugs.     Lab and imaging obtained and reviewed. CBC shows H/H 12.6/37.1 MCV 77 MCH 26.3 RDW 14.9. CMP shows Na+ 133 AG 7 glucose 112 Ca+ 8.4 T. Bili 1.7. . Initial Troponin 25.9. EKG shows sinus tachycardia; with possible left atrial enlargement. CXR shows right mid and lower lung pneumonia. On admit, temp 99.3 Hr 114 RR 16 /110 sats 95% on RA         Per ED provider, patient presented with SOB and productive cough "pink frothy sputum". CXR was completed and showed right mid/lower PNA. Started on IV ABX. However, BNP was elevated and given symptom, a bedside Echo was completed which per ED " "provider more consistent with HF with EF approx 50%. BP significantly elevated tridil gtt initiated, with good BP controlled, tridil gtt discontinued. Patient was also noted to have a decrease in sats to 91% on 5 liters O2, placed on Bipap with O2 sats improving, now currently on 2L o2 per NC with O2 sats 96% on assessment.                Overview/Hospital Course:  Wenceslao Jeong is a 27 year old male with a past medical history of obesity and HTN who presented with shortness of breath, chest pain, and productive cough secondary to a new onset combined congestive heart failure in the setting of hypertensive emergency with demand ischemia. His blood pressure was able to be controlled with a nitrate infusion transitioned to PO medications. He symptoms also improved with IV diuretics. Cardiology was consulted. TTE showed EF of 25% with grade III diastolic dysfunction as well as MR and TR. He is currently stable on room air and off diuretics. Entresto, Aldactone, isosorbide mononitrate and metoprolol have been started. He is pending coronary angiogram 3/13.      Interval History: see "Hospital Course"    Review of Systems   Respiratory:  Negative for shortness of breath.    Cardiovascular:  Negative for chest pain, palpitations and leg swelling.   Objective:     Vital Signs (Most Recent):  Temp: 97.3 °F (36.3 °C) (03/12/23 1100)  Pulse: 88 (03/12/23 1100)  Resp: 18 (03/12/23 1100)  BP: 139/89 (03/12/23 1100)  SpO2: 99 % (03/12/23 1100) Vital Signs (24h Range):  Temp:  [97.3 °F (36.3 °C)-98.1 °F (36.7 °C)] 97.3 °F (36.3 °C)  Pulse:  [] 88  Resp:  [12-20] 18  SpO2:  [96 %-99 %] 99 %  BP: (109-143)/(72-89) 139/89     Weight: 131.5 kg (289 lb 14.5 oz)  Body mass index is 44.09 kg/m².    Intake/Output Summary (Last 24 hours) at 3/12/2023 1414  Last data filed at 3/12/2023 0830  Gross per 24 hour   Intake 740 ml   Output 550 ml   Net 190 ml      Physical Exam  Vitals and nursing note reviewed.   Constitutional:       " Appearance: He is obese.   HENT:      Head: Normocephalic and atraumatic.      Right Ear: External ear normal.      Left Ear: External ear normal.      Nose: Nose normal.      Mouth/Throat:      Mouth: Mucous membranes are moist.      Pharynx: Oropharynx is clear.   Eyes:      Extraocular Movements: Extraocular movements intact.      Conjunctiva/sclera: Conjunctivae normal.   Cardiovascular:      Rate and Rhythm: Normal rate and regular rhythm.      Pulses: Normal pulses.      Heart sounds: Normal heart sounds. No murmur heard.  Pulmonary:      Effort: Pulmonary effort is normal.      Breath sounds: Normal breath sounds.   Abdominal:      General: Bowel sounds are normal.      Palpations: Abdomen is soft.   Musculoskeletal:         General: Normal range of motion.      Cervical back: Normal range of motion and neck supple.      Right lower leg: No edema.      Left lower leg: No edema.   Skin:     General: Skin is warm and dry.   Neurological:      General: No focal deficit present.      Mental Status: He is oriented to person, place, and time. Mental status is at baseline.   Psychiatric:         Mood and Affect: Mood normal.         Behavior: Behavior normal.       Significant Labs: All pertinent labs within the past 24 hours have been reviewed.    Significant Imaging: I have reviewed all pertinent imaging results/findings within the past 24 hours.      Assessment/Plan:      * Acute combined systolic and diastolic congestive heart failure  Unclear etiology. May be related to patient's uncontrolled HTN.  -Continue Entresto, metoprolol and Aldcatone  -Telemetry  -Cardiology consulted  -Strict I's and O's  -Keep K > 4 and Mg > 2  -Angiogram planned for 3/13      Microcytic anemia  Ferritin within normal limits  -Trend Hgb with CBC  -May need Hgb electrophoresis in the outpatient setting    Severe obesity (BMI >= 40)  Body mass index is 44.09 kg/m². Morbid obesity complicates all aspects of disease management from  diagnostic modalities to treatment.        HTN (hypertension)  Controlled with nitrate, BB, Entresto and Aldactone.  -Patient may benefit from secondary HTN workup; consider renal artery ultrasound with Doppler as well as renin/angiotensin ration off Aldactone  -Continue to monitor        VTE Risk Mitigation (From admission, onward)         Ordered     enoxaparin injection 40 mg  Every 12 hours         03/07/23 1938     Place BRANDIE hose  Until discontinued         03/07/23 1930     IP VTE HIGH RISK PATIENT  Once         03/07/23 1930     Place sequential compression device  Until discontinued         03/07/23 1930                Discharge Planning   CAROLINE: 3/12/2023     Code Status: Full Code   Is the patient medically ready for discharge?:     Reason for patient still in hospital (select all that apply): Patient trending condition, Treatment, Imaging and Consult recommendations  Discharge Plan A: Home with family                  Andrea Corrales MD  Department of Hospital Medicine   [facility]

## 2023-03-12 NOTE — HOSPITAL COURSE
Wenceslao Jeong is a 27 year old male with a past medical history of obesity and HTN who presented with shortness of breath, chest pain, and productive cough secondary to a new onset combined congestive heart failure in the setting of hypertensive emergency with demand ischemia. His blood pressure was able to be controlled with a nitrate infusion transitioned to PO medications. He symptoms also improved with IV diuretics. Cardiology was consulted. TTE showed EF of 25% with grade III diastolic dysfunction as well as MR and TR. He is currently stable on room air and off diuretics. Entresto, Aldactone, isosorbide mononitrate and metoprolol have been started. He underwent coronary angiogram 3/14 which showed no evidence of coronary artery disease. He needs a Life Vest on discharge; however, his insurance policy/coverage has decided that they will not cover the cost of this device. A peer to peer meeting took place with continued denial of the device; an appeal is to be instituted. He needs to follow up with his PCP and Cardiology and have a repeat TTE in three months.

## 2023-03-13 LAB
ALBUMIN SERPL BCP-MCNC: 3.5 G/DL (ref 3.5–5.2)
ALP SERPL-CCNC: 51 U/L (ref 55–135)
ALT SERPL W/O P-5'-P-CCNC: 38 U/L (ref 10–44)
ANION GAP SERPL CALC-SCNC: 11 MMOL/L (ref 8–16)
APTT BLDCRRT: 31 SEC (ref 21–32)
AST SERPL-CCNC: 34 U/L (ref 10–40)
BASOPHILS # BLD AUTO: 0.09 K/UL (ref 0–0.2)
BASOPHILS NFR BLD: 1.1 % (ref 0–1.9)
BILIRUB SERPL-MCNC: 0.5 MG/DL (ref 0.1–1)
BUN SERPL-MCNC: 15 MG/DL (ref 6–20)
CALCIUM SERPL-MCNC: 8.7 MG/DL (ref 8.7–10.5)
CHLORIDE SERPL-SCNC: 101 MMOL/L (ref 95–110)
CO2 SERPL-SCNC: 25 MMOL/L (ref 23–29)
CREAT SERPL-MCNC: 1.1 MG/DL (ref 0.5–1.4)
DIFFERENTIAL METHOD: ABNORMAL
EOSINOPHIL # BLD AUTO: 0.2 K/UL (ref 0–0.5)
EOSINOPHIL NFR BLD: 1.9 % (ref 0–8)
ERYTHROCYTE [DISTWIDTH] IN BLOOD BY AUTOMATED COUNT: 14.9 % (ref 11.5–14.5)
EST. GFR  (NO RACE VARIABLE): >60 ML/MIN/1.73 M^2
GLUCOSE SERPL-MCNC: 114 MG/DL (ref 70–110)
HCT VFR BLD AUTO: 36.9 % (ref 40–54)
HGB BLD-MCNC: 12.7 G/DL (ref 14–18)
IMM GRANULOCYTES # BLD AUTO: 0.01 K/UL (ref 0–0.04)
IMM GRANULOCYTES NFR BLD AUTO: 0.1 % (ref 0–0.5)
INR PPP: 1.1 (ref 0.8–1.2)
LYMPHOCYTES # BLD AUTO: 2.9 K/UL (ref 1–4.8)
LYMPHOCYTES NFR BLD: 35.3 % (ref 18–48)
MAGNESIUM SERPL-MCNC: 2.1 MG/DL (ref 1.6–2.6)
MCH RBC QN AUTO: 26.7 PG (ref 27–31)
MCHC RBC AUTO-ENTMCNC: 34.4 G/DL (ref 32–36)
MCV RBC AUTO: 78 FL (ref 82–98)
MONOCYTES # BLD AUTO: 0.8 K/UL (ref 0.3–1)
MONOCYTES NFR BLD: 9.3 % (ref 4–15)
NEUTROPHILS # BLD AUTO: 4.3 K/UL (ref 1.8–7.7)
NEUTROPHILS NFR BLD: 52.3 % (ref 38–73)
NRBC BLD-RTO: 0 /100 WBC
PHOSPHATE SERPL-MCNC: 5 MG/DL (ref 2.7–4.5)
PLATELET # BLD AUTO: 346 K/UL (ref 150–450)
PMV BLD AUTO: 10.8 FL (ref 9.2–12.9)
POTASSIUM SERPL-SCNC: 4.8 MMOL/L (ref 3.5–5.1)
PROT SERPL-MCNC: 7.1 G/DL (ref 6–8.4)
PROTHROMBIN TIME: 11.4 SEC (ref 9–12.5)
RBC # BLD AUTO: 4.76 M/UL (ref 4.6–6.2)
SODIUM SERPL-SCNC: 137 MMOL/L (ref 136–145)
WBC # BLD AUTO: 8.3 K/UL (ref 3.9–12.7)

## 2023-03-13 PROCEDURE — 25000003 PHARM REV CODE 250: Performed by: STUDENT IN AN ORGANIZED HEALTH CARE EDUCATION/TRAINING PROGRAM

## 2023-03-13 PROCEDURE — 93010 EKG 12-LEAD: ICD-10-PCS | Mod: ,,, | Performed by: INTERNAL MEDICINE

## 2023-03-13 PROCEDURE — 80053 COMPREHEN METABOLIC PANEL: CPT | Performed by: NURSE PRACTITIONER

## 2023-03-13 PROCEDURE — 36415 COLL VENOUS BLD VENIPUNCTURE: CPT | Performed by: STUDENT IN AN ORGANIZED HEALTH CARE EDUCATION/TRAINING PROGRAM

## 2023-03-13 PROCEDURE — 25000003 PHARM REV CODE 250: Performed by: HOSPITALIST

## 2023-03-13 PROCEDURE — 99233 SBSQ HOSP IP/OBS HIGH 50: CPT | Mod: ,,, | Performed by: INTERNAL MEDICINE

## 2023-03-13 PROCEDURE — 85610 PROTHROMBIN TIME: CPT | Performed by: STUDENT IN AN ORGANIZED HEALTH CARE EDUCATION/TRAINING PROGRAM

## 2023-03-13 PROCEDURE — 25000003 PHARM REV CODE 250: Performed by: NURSE PRACTITIONER

## 2023-03-13 PROCEDURE — 36415 COLL VENOUS BLD VENIPUNCTURE: CPT | Performed by: NURSE PRACTITIONER

## 2023-03-13 PROCEDURE — 84100 ASSAY OF PHOSPHORUS: CPT | Performed by: NURSE PRACTITIONER

## 2023-03-13 PROCEDURE — 99233 PR SUBSEQUENT HOSPITAL CARE,LEVL III: ICD-10-PCS | Mod: ,,, | Performed by: INTERNAL MEDICINE

## 2023-03-13 PROCEDURE — 85025 COMPLETE CBC W/AUTO DIFF WBC: CPT | Performed by: NURSE PRACTITIONER

## 2023-03-13 PROCEDURE — 99900035 HC TECH TIME PER 15 MIN (STAT)

## 2023-03-13 PROCEDURE — 21400001 HC TELEMETRY ROOM

## 2023-03-13 PROCEDURE — 85730 THROMBOPLASTIN TIME PARTIAL: CPT | Performed by: STUDENT IN AN ORGANIZED HEALTH CARE EDUCATION/TRAINING PROGRAM

## 2023-03-13 PROCEDURE — 94761 N-INVAS EAR/PLS OXIMETRY MLT: CPT

## 2023-03-13 PROCEDURE — 93010 ELECTROCARDIOGRAM REPORT: CPT | Mod: ,,, | Performed by: INTERNAL MEDICINE

## 2023-03-13 PROCEDURE — 83735 ASSAY OF MAGNESIUM: CPT | Performed by: NURSE PRACTITIONER

## 2023-03-13 PROCEDURE — 93005 ELECTROCARDIOGRAM TRACING: CPT | Performed by: INTERNAL MEDICINE

## 2023-03-13 RX ORDER — DIPHENHYDRAMINE HCL 25 MG
50 CAPSULE ORAL
Status: DISCONTINUED | OUTPATIENT
Start: 2023-03-13 | End: 2023-03-14 | Stop reason: HOSPADM

## 2023-03-13 RX ORDER — DIPHENHYDRAMINE HCL 25 MG
50 CAPSULE ORAL
Status: DISCONTINUED | OUTPATIENT
Start: 2023-03-13 | End: 2023-03-14

## 2023-03-13 RX ORDER — SODIUM CHLORIDE 9 MG/ML
INJECTION, SOLUTION INTRAVENOUS ONCE
Status: COMPLETED | OUTPATIENT
Start: 2023-03-13 | End: 2023-03-13

## 2023-03-13 RX ADMIN — CALCIUM CARBONATE (ANTACID) CHEW TAB 500 MG 500 MG: 500 CHEW TAB at 04:03

## 2023-03-13 RX ADMIN — SODIUM CHLORIDE: 0.9 INJECTION, SOLUTION INTRAVENOUS at 06:03

## 2023-03-13 RX ADMIN — ASPIRIN 81 MG CHEWABLE TABLET 324 MG: 81 TABLET CHEWABLE at 09:03

## 2023-03-13 RX ADMIN — SPIRONOLACTONE 25 MG: 25 TABLET, FILM COATED ORAL at 12:03

## 2023-03-13 RX ADMIN — ATORVASTATIN CALCIUM 20 MG: 20 TABLET, FILM COATED ORAL at 08:03

## 2023-03-13 RX ADMIN — POTASSIUM CHLORIDE 20 MEQ: 1500 TABLET, EXTENDED RELEASE ORAL at 05:03

## 2023-03-13 RX ADMIN — FAMOTIDINE 20 MG: 20 TABLET ORAL at 08:03

## 2023-03-13 RX ADMIN — METOPROLOL TARTRATE 25 MG: 25 TABLET, FILM COATED ORAL at 08:03

## 2023-03-13 RX ADMIN — SACUBITRIL AND VALSARTAN 1 TABLET: 24; 26 TABLET, FILM COATED ORAL at 12:03

## 2023-03-13 RX ADMIN — MAGNESIUM OXIDE 400 MG (241.3 MG MAGNESIUM) TABLET 400 MG: at 05:03

## 2023-03-13 RX ADMIN — METOPROLOL TARTRATE 25 MG: 25 TABLET, FILM COATED ORAL at 12:03

## 2023-03-13 RX ADMIN — SACUBITRIL AND VALSARTAN 1 TABLET: 24; 26 TABLET, FILM COATED ORAL at 08:03

## 2023-03-13 NOTE — ASSESSMENT & PLAN NOTE
Unclear etiology. May be related to patient's uncontrolled HTN.  -Continue Entresto, metoprolol and Aldcatone  -Telemetry  -Cardiology consulted  -Strict I's and O's  -Keep K > 4 and Mg > 2  -Angiogram planned for 3/14

## 2023-03-13 NOTE — SUBJECTIVE & OBJECTIVE
"Interval History: see "Hospital Course"    Review of Systems   Respiratory:  Negative for shortness of breath.    Cardiovascular:  Negative for chest pain, palpitations and leg swelling.   Objective:     Vital Signs (Most Recent):  Temp: 97.3 °F (36.3 °C) (03/13/23 1153)  Pulse: 104 (03/13/23 1153)  Resp: 14 (03/13/23 1153)  BP: 138/78 (03/13/23 1153)  SpO2: 95 % (03/13/23 1153) Vital Signs (24h Range):  Temp:  [97.3 °F (36.3 °C)-98.6 °F (37 °C)] 97.3 °F (36.3 °C)  Pulse:  [] 104  Resp:  [14-18] 14  SpO2:  [95 %-99 %] 95 %  BP: (128-144)/(74-95) 138/78     Weight: 131.5 kg (289 lb 14.5 oz)  Body mass index is 44.09 kg/m².    Intake/Output Summary (Last 24 hours) at 3/13/2023 1311  Last data filed at 3/13/2023 0848  Gross per 24 hour   Intake 240 ml   Output 1050 ml   Net -810 ml      Physical Exam  Vitals and nursing note reviewed.   Constitutional:       Appearance: He is obese.   HENT:      Head: Normocephalic and atraumatic.      Right Ear: External ear normal.      Left Ear: External ear normal.      Nose: Nose normal.      Mouth/Throat:      Mouth: Mucous membranes are moist.      Pharynx: Oropharynx is clear.   Eyes:      Extraocular Movements: Extraocular movements intact.      Conjunctiva/sclera: Conjunctivae normal.   Cardiovascular:      Rate and Rhythm: Normal rate and regular rhythm.      Pulses: Normal pulses.      Heart sounds: Normal heart sounds. No murmur heard.  Pulmonary:      Effort: Pulmonary effort is normal.      Breath sounds: Normal breath sounds.   Abdominal:      General: Bowel sounds are normal.      Palpations: Abdomen is soft.   Musculoskeletal:         General: Normal range of motion.      Cervical back: Normal range of motion and neck supple.      Right lower leg: No edema.      Left lower leg: No edema.   Skin:     General: Skin is warm and dry.   Neurological:      General: No focal deficit present.      Mental Status: He is oriented to person, place, and time. Mental status " is at baseline.   Psychiatric:         Mood and Affect: Mood normal.         Behavior: Behavior normal.       Significant Labs: All pertinent labs within the past 24 hours have been reviewed.    Significant Imaging: I have reviewed all pertinent imaging results/findings within the past 24 hours.

## 2023-03-13 NOTE — PLAN OF CARE
Problem: Adult Inpatient Plan of Care  Goal: Plan of Care Review  Outcome: Ongoing, Progressing  Goal: Readiness for Transition of Care  Outcome: Ongoing, Progressing     Problem: Bariatric Environmental Safety  Goal: Safety Maintained with Care  Outcome: Ongoing, Progressing     Problem: Fall Injury Risk  Goal: Absence of Fall and Fall-Related Injury  Outcome: Ongoing, Progressing

## 2023-03-13 NOTE — PLAN OF CARE
03/13/23 0941   Discharge Reassessment   Assessment Type Discharge Planning Reassessment   Did the patient's condition or plan change since previous assessment? Yes   Discharge Plan discussed with: Patient   Communicated CAROLINE with patient/caregiver Yes   Discharge Plan A Home with family   Discharge Plan B Home   DME Needed Upon Discharge  other (see comments)  (lifevest)   Discharge Barriers Identified None   Why the patient remains in the hospital Requires continued medical care   Post-Acute Status   Discharge Delays None known at this time     Patient NPO for angiogram with cardiology today. Per chart review, patient will need lifevest on discharge. Cardiology notified Nafisa with [employer]- case management to send lifevest orders when obtained. CM to follow     1531 - Per chart review, angiogram re-scheduled for tomorrow, 03/14/2023. Patient NPO at midnight. CM following

## 2023-03-13 NOTE — PROGRESS NOTES
[facility]  Adult Nutrition   Progress Note (Follow-Up)    SUMMARY      Recommendations:   1. Continue current cardiac diet as soon as angiogram is completed.  2.  continue to obtain daily meal selections.     Goals:   1. Patient to meet at least 75% of estimated energy and 100% protein needs.   2. Patient to express understanding of diet recommendations and lifestyle changes. RD to answer diet related questions should they arise.    Dietitian Rounds Brief  F/U Nutrition Note: Pt tells me that he is eating 100% of all meals of his cardiac diet with his LBM today. Will continue to follow prn.    PRINCIPAL PROBLEM: Acute combined systolic and diastolic congestive heart failure        REASON FOR CONSULT:     Newly diagnosed systolic HF        INTERVAL HISTORY:  3/12/23:  Pt resting in chair, ambulatory in room; denies dyspnea, chest pain, fluid retention; Breathing comfortably on room air; NSR on tele w/o acute events overnight; Creatinine 1.1     3/11/23:  Pt OOB in chair; denies dyspnea or cough; edema improved;   Creatinine improved today, 1.2; VSS, NSR on telemetry w/o any acute events overnight     3/10/23  Patient is resting comfortably during examination.  No acute distress.  Alert and oriented.  Vital signs stable.  Patient was supposed to have angiogram this morning.  However, creatinine increased to 1.5 overnight.  At this time we will postpone angiogram to Monday morning in view of LISA.       HPI:      Patient is a 27-year-old male with past medical history of hypertension who presented to the ED with acute onset of chest pain and shortness of breath with exertion, alleviated with rest for the past 3 weeks, worsening over the past 2-3 days.  Patient also reports a cough with pink foamy sputum.  Patient was recently started on losartan 50 mg daily on February 1, 2023 by his PCP.  Patient denies fever, chills, diaphoresis, lightheadedness, dizziness, palpitations, edema or bleeding.   Chest x-ray shows right middle and lower lung pneumonia.  CT of the chest also shows multifocal pneumonia.  Blood pressure significantly elevated in ED and patient was started on a Tridil drip.  , echocardiogram performed with EF of 25% and grade 3 diastolic dysfunction, moderate MR/TR.  Patient was also found to be hypoxic and placed on 5 L and subsequently placed on BiPAP for worsening dyspnea.     During examination, patient is alert and oriented, on room air, no acute distress, sinus rhythm on telemetry.  Patient states he is feeling much better and denies active chest pain or shortness of breath.        FROM H&P:  HPI: Wenceslao Jeong is a 27 y.o. male with a history as  has no past medical history on file. who presented to the ED with a Chest Pain (Acute onset of chest pain during exertion. Allevaited with rest. Pt reports hx of MEJIA. Hx of HTN. )     Patient presents to the ED with a c/o SOB for about 3 weeks worsening on last 2-3 days and now with cough with pink foamy sputum. Patient reports he was seen by his PCP because he had been SOB and was told he did not have pneumonia, however was started on BP medications (losartan 50 mg daily) February 1, 2023.  Patient further states he was supposed to follow up with PCP to see if the losartan was working but he missed the appointment because his job was short and he had to fill in.      Denies fever, chills, diaphoresis, dizziness, HA, chest pain, palpitations, NVD, recent trauma or any other associated symptoms. No aggravating and alleviating factor.  Does not smoke cigarettes, drink or do illegal drugs.      Lab and imaging obtained and reviewed. CBC shows H/H 12.6/37.1 MCV 77 MCH 26.3 RDW 14.9. CMP shows Na+ 133 AG 7 glucose 112 Ca+ 8.4 T. Bili 1.7. . Initial Troponin 25.9. EKG shows sinus tachycardia; with possible left atrial enlargement. CXR shows right mid and lower lung pneumonia. On admit, temp 99.3 Hr 114 RR 16 /110 sats 95% on RA    "        Per ED provider, patient presented with SOB and productive cough "pink frothy sputum". CXR was completed and showed right mid/lower PNA. Started on IV ABX. However, BNP was elevated and given symptom, a bedside Echo was completed which per ED provider more consistent with HF with EF approx 50%. BP significantly elevated tridil gtt initiated, with good BP controlled, tridil gtt discontinued. Patient was also noted to have a decrease in sats to 91% on 5 liters O2, placed on Bipap with O2 sats improving, now currently on 2L o2 per NC with O2 sats 96% on assessment.             Diet order: Cardiac    % Intake of Estimated Energy Needs: 75 - 100 %  % Meal Intake: 75 - 100 %    Estimated/Assessed Needs  Weight Used For Calorie Calculations: 129.9 kg (286 lb 6 oz)  Energy Calorie Requirements (kcal): 2598 (20 kcal/kg)  Energy Need Method: Kcal/kg  Protein Requirements: 105 - 140 (1.5 - 2 g/kg IBW)  Weight Used For Protein Calculations: 70 kg (154 lb 5.2 oz) (IBW)  Fluid Requirements (mL): 1750 (25 ml/kg IBW) or per MD  Estimated Fluid Requirement Method: other (see comments)  RDA Method (mL): 2598       Weight History:  Wt Readings from Last 5 Encounters:   03/11/23 131.5 kg (289 lb 14.5 oz)   03/08/23 (!) 138.3 kg (305 lb)        Reason for Assessment  Reason For Assessment: consult  Diagnosis: cardiac disease  Relevant Medical History: Acute pulmonary edema;  Primary hypertension;  Hyponatremia;  Acute combined systolic and diastolic congestive heart failure    Medications:Pertinent Medications Reviewed  Scheduled Meds:   aspirin  324 mg Oral Daily    atorvastatin  20 mg Oral QHS    calcium carbonate  500 mg Oral TID WM    enoxaparin  40 mg Subcutaneous Q12H    famotidine  20 mg Oral BID    isosorbide mononitrate  30 mg Oral Daily    metoprolol tartrate  25 mg Oral BID    sacubitriL-valsartan  1 tablet Oral BID    spironolactone  25 mg Oral Daily     Continuous Infusions:  PRN Meds:.benzonatate, dextrose 10%, " dextrose 10%, diphenhydrAMINE, glucagon (human recombinant), glucose, glucose, hydrALAZINE, ibuprofen, levalbuterol, magnesium oxide, magnesium oxide, melatonin, naloxone, potassium bicarbonate, potassium bicarbonate, potassium bicarbonate, potassium, sodium phosphates, potassium, sodium phosphates, potassium, sodium phosphates, sodium chloride 0.9%, sodium chloride 0.9%    Labs: Pertinent Labs Reviewed  Clinical Chemistry:  Recent Labs   Lab 03/11/23  0424 03/12/23  0439 03/13/23  0452    136 137   K 4.0 4.5 4.8   CL 99 99 101   CO2 27 27 25    113* 114*   BUN 21* 19 15   CREATININE 1.2 1.1 1.1   CALCIUM 8.9 9.0 8.7   PROT 7.8 7.1 7.1   ALBUMIN 3.7 3.6 3.5   BILITOT 0.9 0.5 0.5   ALKPHOS 57 52* 51*   AST 21 21 34   ALT 26 26 38   ANIONGAP 10 10 11   MG 2.2 2.2 2.1   PHOS 4.8* 4.8* 5.0*     CBC:   Recent Labs   Lab 03/13/23  0452   WBC 8.30   RBC 4.76   HGB 12.7*   HCT 36.9*      MCV 78*   MCH 26.7*   MCHC 34.4     Lipid Panel:  Recent Labs   Lab 03/08/23  0446   CHOL 179   HDL 55   LDLCALC 105.2   TRIG 94   CHOLHDL 30.7     Cardiac Profile:  Recent Labs   Lab 03/07/23  1528 03/10/23  0427   * 135*   *  --      Inflammatory Labs:  Recent Labs   Lab 03/07/23  1941   CRP 5.79*     Diabetes:  Recent Labs   Lab 03/08/23  0446   HGBA1C 5.8     Thyroid & Parathyroid:  Recent Labs   Lab 03/08/23  0446   TSH 1.300       Monitor and Evaluation  Food and Nutrient Intake: energy intake, food and beverage intake  Food and Nutrient Adminstration: diet order  Knowledge/Beliefs/Attitudes: beliefs and attitudes, food and nutrition knowledge/skill  Physical Activity and Function: factors affecting access to physical activity, nutrition-related ADLs and IADLs  Anthropometric Measurements: weight, weight change, body mass index  Biochemical Data, Medical Tests and Procedures: electrolyte and renal panel, inflammatory profile, gastrointestinal profile, lipid profile, glucose/endocrine  profile  Nutrition-Focused Physical Findings: overall appearance     Nutrition Risk  Level of Risk/Frequency of Follow-up: moderate - high     Nutrition Follow-Up  RD Follow-up?: Yes    Karo Ruth RD 03/13/2023 11:55 AM

## 2023-03-13 NOTE — PROGRESS NOTES
"[facility] Medicine  Progress Note    Patient Name: Wenceslao Jeong  MRN: [id number]  Patient Class: IP- Inpatient   Admission Date: 3/7/2023  Length of Stay: 6 days  Attending Physician: Andrea Corrales MD  Primary Care Provider: BELKYS Wheeler        Subjective:     Principal Problem:Acute combined systolic and diastolic congestive heart failure        HPI:  Wenceslao Jeong is a 27 y.o. male with a history as  has no past medical history on file. who presented to the ED with a Chest Pain (Acute onset of chest pain during exertion. Allevaited with rest. Pt reports hx of MEJIA. Hx of HTN. )    Patient presents to the ED with a c/o SOB for about 3 weeks worsening on last 2-3 days and now with cough with pink foamy sputum. Patient reports he was seen by his PCP because he had been SOB and was told he did not have pneumonia, however was started on BP medications (losartan 50 mg daily) February 1, 2023.  Patient further states he was supposed to follow up with PCP to see if the losartan was working but he missed the appointment because his job was short and he had to fill in.     Denies fever, chills, diaphoresis, dizziness, HA, chest pain, palpitations, NVD, recent trauma or any other associated symptoms. No aggravating and alleviating factor.  Does not smoke cigarettes, drink or do illegal drugs.     Lab and imaging obtained and reviewed. CBC shows H/H 12.6/37.1 MCV 77 MCH 26.3 RDW 14.9. CMP shows Na+ 133 AG 7 glucose 112 Ca+ 8.4 T. Bili 1.7. . Initial Troponin 25.9. EKG shows sinus tachycardia; with possible left atrial enlargement. CXR shows right mid and lower lung pneumonia. On admit, temp 99.3 Hr 114 RR 16 /110 sats 95% on RA         Per ED provider, patient presented with SOB and productive cough "pink frothy sputum". CXR was completed and showed right mid/lower PNA. Started on IV ABX. However, BNP was elevated and given symptom, a bedside Echo was completed which per ED " "provider more consistent with HF with EF approx 50%. BP significantly elevated tridil gtt initiated, with good BP controlled, tridil gtt discontinued. Patient was also noted to have a decrease in sats to 91% on 5 liters O2, placed on Bipap with O2 sats improving, now currently on 2L o2 per NC with O2 sats 96% on assessment.                Overview/Hospital Course:  Wenceslao Jeong is a 27 year old male with a past medical history of obesity and HTN who presented with shortness of breath, chest pain, and productive cough secondary to a new onset combined congestive heart failure in the setting of hypertensive emergency with demand ischemia. His blood pressure was able to be controlled with a nitrate infusion transitioned to PO medications. He symptoms also improved with IV diuretics. Cardiology was consulted. TTE showed EF of 25% with grade III diastolic dysfunction as well as MR and TR. He is currently stable on room air and off diuretics. Entresto, Aldactone, isosorbide mononitrate and metoprolol have been started. He is pending coronary angiogram 3/14. He is NPO at midnight.      Interval History: see "Hospital Course"    Review of Systems   Respiratory:  Negative for shortness of breath.    Cardiovascular:  Negative for chest pain, palpitations and leg swelling.   Objective:     Vital Signs (Most Recent):  Temp: 97.3 °F (36.3 °C) (03/13/23 1153)  Pulse: 104 (03/13/23 1153)  Resp: 14 (03/13/23 1153)  BP: 138/78 (03/13/23 1153)  SpO2: 95 % (03/13/23 1153) Vital Signs (24h Range):  Temp:  [97.3 °F (36.3 °C)-98.6 °F (37 °C)] 97.3 °F (36.3 °C)  Pulse:  [] 104  Resp:  [14-18] 14  SpO2:  [95 %-99 %] 95 %  BP: (128-144)/(74-95) 138/78     Weight: 131.5 kg (289 lb 14.5 oz)  Body mass index is 44.09 kg/m².    Intake/Output Summary (Last 24 hours) at 3/13/2023 1311  Last data filed at 3/13/2023 0848  Gross per 24 hour   Intake 240 ml   Output 1050 ml   Net -810 ml      Physical Exam  Vitals and nursing note reviewed. "   Constitutional:       Appearance: He is obese.   HENT:      Head: Normocephalic and atraumatic.      Right Ear: External ear normal.      Left Ear: External ear normal.      Nose: Nose normal.      Mouth/Throat:      Mouth: Mucous membranes are moist.      Pharynx: Oropharynx is clear.   Eyes:      Extraocular Movements: Extraocular movements intact.      Conjunctiva/sclera: Conjunctivae normal.   Cardiovascular:      Rate and Rhythm: Normal rate and regular rhythm.      Pulses: Normal pulses.      Heart sounds: Normal heart sounds. No murmur heard.  Pulmonary:      Effort: Pulmonary effort is normal.      Breath sounds: Normal breath sounds.   Abdominal:      General: Bowel sounds are normal.      Palpations: Abdomen is soft.   Musculoskeletal:         General: Normal range of motion.      Cervical back: Normal range of motion and neck supple.      Right lower leg: No edema.      Left lower leg: No edema.   Skin:     General: Skin is warm and dry.   Neurological:      General: No focal deficit present.      Mental Status: He is oriented to person, place, and time. Mental status is at baseline.   Psychiatric:         Mood and Affect: Mood normal.         Behavior: Behavior normal.       Significant Labs: All pertinent labs within the past 24 hours have been reviewed.    Significant Imaging: I have reviewed all pertinent imaging results/findings within the past 24 hours.      Assessment/Plan:      * Acute combined systolic and diastolic congestive heart failure  Unclear etiology. May be related to patient's uncontrolled HTN.  -Continue Entresto, metoprolol and Aldcatone  -Telemetry  -Cardiology consulted  -Strict I's and O's  -Keep K > 4 and Mg > 2  -Angiogram planned for 3/14      Microcytic anemia  Ferritin within normal limits  -Trend Hgb with CBC  -May need Hgb electrophoresis in the outpatient setting    Severe obesity (BMI >= 40)  Body mass index is 44.09 kg/m². Morbid obesity complicates all aspects of  disease management from diagnostic modalities to treatment.        HTN (hypertension)  Controlled with nitrate, BB, Entresto and Aldactone.  -Patient may benefit from secondary HTN workup; consider renal artery ultrasound with Doppler as well as renin/angiotensin ration off Aldactone  -Continue to monitor        VTE Risk Mitigation (From admission, onward)         Ordered     enoxaparin injection 40 mg  Every 12 hours         03/07/23 1938     Place BRANDIE hose  Until discontinued         03/07/23 1930     IP VTE HIGH RISK PATIENT  Once         03/07/23 1930     Place sequential compression device  Until discontinued         03/07/23 1930                Discharge Planning   CAROLINE: 3/14/2023     Code Status: Full Code   Is the patient medically ready for discharge?:     Reason for patient still in hospital (select all that apply): Imaging and Consult recommendations  Discharge Plan A: Home with family   Discharge Delays: None known at this time              Andrea Corrales MD  Department of Hospital Medicine   [facility]

## 2023-03-13 NOTE — PROGRESS NOTES
[facility]  Department of Cardiology  Progress Note      PATIENT NAME: Wenceslao Jeong  MRN: [id number]  TODAY'S DATE: 03/13/2023  ADMIT DATE: 3/7/2023                          CONSULT REQUESTED BY: Andrea Corrales MD    SUBJECTIVE     PRINCIPAL PROBLEM: Acute combined systolic and diastolic congestive heart failure      REASON FOR CONSULT:    Newly diagnosed systolic HF      INTERVAL HISTORY:  3/13/23  Pt resting in bed; denies recent shortness of breath; No MEJIA; no swelling in abdomen or legs; creatinine 1.1 today;     3/12/23:  Pt resting in chair, ambulatory in room; denies dyspnea, chest pain, fluid retention; Breathing comfortably on room air; NSR on tele w/o acute events overnight; Creatinine 1.1    3/11/23:  Pt OOB in chair; denies dyspnea or cough; edema improved;   Creatinine improved today, 1.2; VSS, NSR on telemetry w/o any acute events overnight    3/10/23  Patient is resting comfortably during examination.  No acute distress.  Alert and oriented.  Vital signs stable.  Patient was supposed to have angiogram this morning.  However, creatinine increased to 1.5 overnight.  At this time we will postpone angiogram to Monday morning in view of LISA.      HPI:     Patient is a 27-year-old male with past medical history of hypertension who presented to the ED with acute onset of chest pain and shortness of breath with exertion, alleviated with rest for the past 3 weeks, worsening over the past 2-3 days.  Patient also reports a cough with pink foamy sputum.  Patient was recently started on losartan 50 mg daily on February 1, 2023 by his PCP.  Patient denies fever, chills, diaphoresis, lightheadedness, dizziness, palpitations, edema or bleeding.  Chest x-ray shows right middle and lower lung pneumonia.  CT of the chest also shows multifocal pneumonia.  Blood pressure significantly elevated in ED and patient was started on a Tridil drip.  , echocardiogram performed with EF of 25% and grade 3  "diastolic dysfunction, moderate MR/TR.  Patient was also found to be hypoxic and placed on 5 L and subsequently placed on BiPAP for worsening dyspnea.    During examination, patient is alert and oriented, on room air, no acute distress, sinus rhythm on telemetry.  Patient states he is feeling much better and denies active chest pain or shortness of breath.      FROM H&P:  HPI: Wenceslao Jeong is a 27 y.o. male with a history as  has no past medical history on file. who presented to the ED with a Chest Pain (Acute onset of chest pain during exertion. Allevaited with rest. Pt reports hx of MEJIA. Hx of HTN. )     Patient presents to the ED with a c/o SOB for about 3 weeks worsening on last 2-3 days and now with cough with pink foamy sputum. Patient reports he was seen by his PCP because he had been SOB and was told he did not have pneumonia, however was started on BP medications (losartan 50 mg daily) February 1, 2023.  Patient further states he was supposed to follow up with PCP to see if the losartan was working but he missed the appointment because his job was short and he had to fill in.      Denies fever, chills, diaphoresis, dizziness, HA, chest pain, palpitations, NVD, recent trauma or any other associated symptoms. No aggravating and alleviating factor.  Does not smoke cigarettes, drink or do illegal drugs.      Lab and imaging obtained and reviewed. CBC shows H/H 12.6/37.1 MCV 77 MCH 26.3 RDW 14.9. CMP shows Na+ 133 AG 7 glucose 112 Ca+ 8.4 T. Bili 1.7. . Initial Troponin 25.9. EKG shows sinus tachycardia; with possible left atrial enlargement. CXR shows right mid and lower lung pneumonia. On admit, temp 99.3 Hr 114 RR 16 /110 sats 95% on RA           Per ED provider, patient presented with SOB and productive cough "pink frothy sputum". CXR was completed and showed right mid/lower PNA. Started on IV ABX. However, BNP was elevated and given symptom, a bedside Echo was completed which per ED provider " more consistent with HF with EF approx 50%. BP significantly elevated tridil gtt initiated, with good BP controlled, tridil gtt discontinued. Patient was also noted to have a decrease in sats to 91% on 5 liters O2, placed on Bipap with O2 sats improving, now currently on 2L o2 per NC with O2 sats 96% on assessment.         Review of patient's allergies indicates:  No Known Allergies    No past medical history on file.  No past surgical history on file.        REVIEW OF SYSTEMS  GEN: no c/o  RESP: no coughing, dyspnea or MEJIA;  CV: No CP or fluid retention today    OBJECTIVE     VITAL SIGNS (Most Recent)  Temp: 97.3 °F (36.3 °C) (03/13/23 1153)  Pulse: 98 (03/13/23 1655)  Resp: (!) 24 (03/13/23 1655)  BP: (!) 115/58 (03/13/23 1655)  SpO2: 95 % (03/13/23 1655)    VENTILATION STATUS  Resp: (!) 24 (03/13/23 1655)  SpO2: 95 % (03/13/23 1655)  Oxygen Concentration (%):  [0.21] 0.21    I & O (Last 24H):  Intake/Output Summary (Last 24 hours) at 3/13/2023 1730  Last data filed at 3/13/2023 1430  Gross per 24 hour   Intake 360 ml   Output 1050 ml   Net -690 ml         WEIGHTS  Wt Readings from Last 3 Encounters:   03/11/23 0332 131.5 kg (289 lb 14.5 oz)   03/10/23 0555 129.9 kg (286 lb 6 oz)   03/09/23 0417 130.6 kg (287 lb 14.7 oz)   03/07/23 1458 (!) 138.3 kg (305 lb)   03/08/23 1353 (!) 138.3 kg (305 lb)       PHYSICAL EXAM  GENERAL: well built, obese male, breathing comfortably in no apparent distress alert and oriented.   HEENT: Normocephalic. Pupils normal and conjunctivae normal.    NECK: No JVD. No bruit..   THYROID: Thyroid not examined  CARDIAC: Regular rate and rhythm. S1 is normal.S2 is normal.No gallops, clicks or murmurs noted at this time.  CHEST ANATOMY: normal.   LUNGS: Clear to auscultation. No wheezing or rhonchi..   ABDOMEN: Soft. Normal bowel sounds.  Nontender  URINARY: No serna catheter   EXTREMITIES: No cyanosis, clubbing or edema noted at this time.  CENTRAL NERVOUS SYSTEM: No focal motor or sensory  deficits noted.   SKIN: Skin without lesions, moist, well perfused.   MUSCLE STRENGTH & TONE: No noteable weakness, atrophy or abnormal movement.     HOME MEDICATIONS:  No current facility-administered medications on file prior to encounter.     Current Outpatient Medications on File Prior to Encounter   Medication Sig Dispense Refill    albuterol (PROVENTIL/VENTOLIN HFA) 90 mcg/actuation inhaler Inhale 2 puffs into the lungs every 4 (four) hours as needed.      losartan (COZAAR) 50 MG tablet Take 50 mg by mouth once daily.         SCHEDULED MEDS:   aspirin  324 mg Oral Daily    atorvastatin  20 mg Oral QHS    calcium carbonate  500 mg Oral TID WM    enoxaparin  40 mg Subcutaneous Q12H    famotidine  20 mg Oral BID    isosorbide mononitrate  30 mg Oral Daily    metoprolol tartrate  25 mg Oral BID    sacubitriL-valsartan  1 tablet Oral BID    spironolactone  25 mg Oral Daily       CONTINUOUS INFUSIONS:        PRN MEDS:benzonatate, dextrose 10%, dextrose 10%, diphenhydrAMINE, glucagon (human recombinant), glucose, glucose, hydrALAZINE, ibuprofen, levalbuterol, magnesium oxide, magnesium oxide, melatonin, naloxone, potassium bicarbonate, potassium bicarbonate, potassium bicarbonate, potassium, sodium phosphates, potassium, sodium phosphates, potassium, sodium phosphates, sodium chloride 0.9%, sodium chloride 0.9%    LABS AND DIAGNOSTICS     CBC LAST 3 DAYS  Recent Labs   Lab 03/11/23  0424 03/12/23  0439 03/13/23  0452   WBC 8.86 8.90 8.30   RBC 4.93 4.75 4.76   HGB 12.9* 12.2* 12.7*   HCT 38.5* 37.4* 36.9*   MCV 78* 79* 78*   MCH 26.2* 25.7* 26.7*   MCHC 33.5 32.6 34.4   RDW 15.1* 14.8* 14.9*    382 346   MPV 11.4 11.5 10.8   GRAN 48.4  4.3 42.9  3.8 52.3  4.3   LYMPH 39.7  3.5 44.4  4.0 35.3  2.9   MONO 8.1  0.7 9.4  0.8 9.3  0.8   BASO 0.07 0.05 0.09   NRBC 0 0 0         COAGULATION LAST 3 DAYS  Recent Labs   Lab 03/13/23  1020   INR 1.1   APTT 31.0       CHEMISTRY LAST 3 DAYS  Recent Labs   Lab  03/07/23  1734 03/08/23 0446 03/11/23  0424 03/12/23  0439 03/13/23  0452   NA  --    < > 136 136 137   K  --    < > 4.0 4.5 4.8   CL  --    < > 99 99 101   CO2  --    < > 27 27 25   ANIONGAP  --    < > 10 10 11   BUN  --    < > 21* 19 15   CREATININE  --    < > 1.2 1.1 1.1   GLU  --    < > 103 113* 114*   CALCIUM  --    < > 8.9 9.0 8.7   PH 7.393  --   --   --   --    MG  --    < > 2.2 2.2 2.1   ALBUMIN  --    < > 3.7 3.6 3.5   PROT  --    < > 7.8 7.1 7.1   ALKPHOS  --    < > 57 52* 51*   ALT  --    < > 26 26 38   AST  --    < > 21 21 34   BILITOT  --    < > 0.9 0.5 0.5    < > = values in this interval not displayed.         CARDIAC PROFILE LAST 3 DAYS  Recent Labs   Lab 03/07/23  1528 03/07/23  1941 03/08/23  0446 03/10/23  0427   *  --   --  135*   *  --   --   --    TROPONINIHS 25.9* 25.3* 27.1*  --          ENDOCRINE LAST 3 DAYS  Recent Labs   Lab 03/07/23  1941 03/08/23 0446   TSH  --  1.300   PROCAL 0.05  --          LAST ARTERIAL BLOOD GAS  ABG  Recent Labs   Lab 03/07/23  1734   PH 7.393   PO2 50*   PCO2 39.8   HCO3 24.3   BE -1         LAST 7 DAYS MICROBIOLOGY   Microbiology Results (last 7 days)       ** No results found for the last 168 hours. **            MOST RECENT IMAGING  Echo  Addendum: · The left ventricle is normal in size with mild eccentric hypertrophy and  severely decreased systolic function.   · The estimated ejection fraction is 25%.   · Grade III left ventricular diastolic dysfunction.   · Normal right ventricular size with normal right ventricular systolic  function.   · Mild right atrial enlargement.   · Moderate mitral regurgitation.   · Mild to moderate tricuspid regurgitation.   · Mild pulmonic regurgitation.   · Moderate left atrial enlargement.  Narrative: · The left ventricle is normal in size with mild eccentric hypertrophy and   mildly decreased systolic function.  · The estimated ejection fraction is 45%.  · Grade III left ventricular diastolic dysfunction.  ·  Normal right ventricular size with normal right ventricular systolic   function.  · Moderate left atrial enlargement.  · Mild right atrial enlargement.  · Moderate mitral regurgitation.  · Mild to moderate tricuspid regurgitation.  · Mild pulmonic regurgitation.     CTA Chest Non-Coronary (PE Studies)  CMS MANDATED QUALITY DATA-CT RADIATION DOSE-[id number]  All CT scans at this facility use dose modulation, iterative reconstruction, and or weight-based dosing when appropriate to reduce radiation dose to as low as reasonably achievable.    HISTORY: Pulmonary embolism (PE) suspected, unknown D-dimer    FINDINGS: Thin axial imaging through the chest was performed with 100 mL Omnipaque 350 IV contrast, with sagittal and coronal reformatted images and MIP reconstructions performed, and images stored in the patient's permanent electronic medical record.    Comparison to the chest radiograph of the prior day. There are no pulmonary arterial filling defects to suggest pulmonary thromboembolism. The central pulmonary arteries are normal in caliber.    The aorta is normal in caliber and tapers appropriately. The heart is enlarged, with dilated left ventricle. There is no pericardial effusion, with no enlarged mediastinal or hilar lymph nodes.    There are patchy airspace opacities and groundglass densities in both lungs, most pronounced in the right lower lobe with some air bronchograms, suspicious for multifocal pneumonia. There is no evidence of interstitial pulmonary edema, with no pleural effusion. The central airways are patent, with no pneumothorax.    Images of the upper abdomen are unremarkable. There are no acute fractures or destructive osseous lesions.    IMPRESSION:  1. Negative for pulmonary thromboembolism.  2. Scattered interstitial and airspace opacities in both lungs, suspicious for multifocal pneumonia. Viral or atypical pneumonia could have this appearance.  3. Cardiomegaly with dilated left  ventricle.    Electronically signed by:  Darrin Roland MD  3/8/2023 11:38 AM [facility], [place] Workstation: [id number]      ECHOCARDIOGRAM RESULTS (last 5)  Results for orders placed during the hospital encounter of 03/07/23    Echo    Interpretation Summary  · The left ventricle is normal in size with mild eccentric hypertrophy and severely decreased systolic function.  · The estimated ejection fraction is 25%.  · Grade III left ventricular diastolic dysfunction.  · Normal right ventricular size with normal right ventricular systolic function.  · Mild right atrial enlargement.  · Moderate mitral regurgitation.  · Mild to moderate tricuspid regurgitation.  · Mild pulmonic regurgitation.  · Moderate left atrial enlargement.      CURRENT/PREVIOUS VISIT EKG  Results for orders placed or performed during the hospital encounter of 03/07/23   EKG 12-lead    Collection Time: 03/13/23  9:47 AM    Narrative    Test Reason : I50.9,I42.9,    Vent. Rate : 107 BPM     Atrial Rate : 107 BPM     P-R Int : 166 ms          QRS Dur : 100 ms      QT Int : 368 ms       P-R-T Axes : 046 012 059 degrees     QTc Int : 491 ms    Sinus tachycardia  Possible Left atrial enlargement  Borderline Abnormal ECG  When compared with ECG of 08-MAR-2023 07:46,  Nonspecific T wave abnormality no longer evident in Inferior leads  Confirmed by Mani Marcus MD ([id number]) on 3/13/2023 4:54:59 PM    Referred By: JOHNERR   SELF           Confirmed By:Mani Marcus MD           ASSESSMENT/PLAN:     Active Hospital Problems    Diagnosis    *Acute combined systolic and diastolic congestive heart failure    Severe obesity (BMI >= 40)    Microcytic anemia    HTN (hypertension)       ASSESSMENT & PLAN:     New onset combined systolic and diastolic heart failure  Acute pulmonary edema  Hypertensive urgency  Acute respiratory failure with hypoxia  Right middle/lower lobe pneumonia  Elevated troponin    RECOMMENDATIONS:    Patient presented with hypertensive emergency and new  onset combined systolic and diastolic heart failure- EF of 25% with grade 3 diastolic dysfunction as well as moderate MR/TR.      Patient had downtrend in BNP and weight loss of 16 lbs since 3/8/23. Appears euvolemic. Continue daily weights, strict I&Os, 1.5 L fluid restriction.  Low-sodium heart healthy diet.    Blood pressure has remained at goal .  Continue metoprolol tartrate 25 mg b.i.d., isosorbide mononitrate 30 mg daily, spironolactone 25 mg daily and Entresto 24-26 mg BID.  May increase Entresto dosage prior to discharge or outpatient. Hold antihypertensives for systolic BP less than 100.    Angiogram planned for tomorrow to evaluate for coronary blockages. Hold Lovenox tonight.     5. Patient will need life vest. [employer] representative notified. Orders for device placed, case management notified.        Serena Bird NP  On license of [facility]  Department of Cardiology  Date of Service: 03/13/2023    27-year-old with history of dilated cardiomyopathy with symptoms initiating in middle of December became progressive noted to have severe diastolic dysfunction as well as moderate mitral tricuspid insufficiency with markedly reduced ejection fraction of 25%.  His blood pressure has improved.  Will initiate optimal therapy for management of heart failure in the meantime will proceed with angiographic assessment more definite diagnosis.  Risks and benefits of cardiac catheterization including risks of any arterial damage, major risks of heart attack stroke and days and groin complications including hematoma rarely damage to the artery have been reviewed with the patient in detail his anxious to proceed with that he is aware of these risks has been explained to him from other physicians.  In the meanwhile will optimize his medical therapy.  I have personally interviewed and examined the patient, I have reviewed the Nurse Practitioner's history and physical, assessment, and plan. I have personally  evaluated the patient at bedside and agree with the findings and made appropriate changes as necessary in recommendations.    Dr. DEIRDRE Marcus MD  Department of Cardiology  [facility]  3/12/23

## 2023-03-13 NOTE — NURSING
1236 Pt scheduled procedure canceled for today.  Cardio will discuss with pt.  Diet added. Pt and family aware.

## 2023-03-14 LAB
ALBUMIN SERPL BCP-MCNC: 3.6 G/DL (ref 3.5–5.2)
ALP SERPL-CCNC: 54 U/L (ref 55–135)
ALT SERPL W/O P-5'-P-CCNC: 41 U/L (ref 10–44)
ANION GAP SERPL CALC-SCNC: 6 MMOL/L (ref 8–16)
AST SERPL-CCNC: 29 U/L (ref 10–40)
BASOPHILS # BLD AUTO: 0.07 K/UL (ref 0–0.2)
BASOPHILS NFR BLD: 0.8 % (ref 0–1.9)
BILIRUB SERPL-MCNC: 0.5 MG/DL (ref 0.1–1)
BUN SERPL-MCNC: 14 MG/DL (ref 6–20)
CALCIUM SERPL-MCNC: 8.7 MG/DL (ref 8.7–10.5)
CHLORIDE SERPL-SCNC: 106 MMOL/L (ref 95–110)
CO2 SERPL-SCNC: 25 MMOL/L (ref 23–29)
CREAT SERPL-MCNC: 1.1 MG/DL (ref 0.5–1.4)
DIFFERENTIAL METHOD: ABNORMAL
EOSINOPHIL # BLD AUTO: 0.1 K/UL (ref 0–0.5)
EOSINOPHIL NFR BLD: 1.6 % (ref 0–8)
ERYTHROCYTE [DISTWIDTH] IN BLOOD BY AUTOMATED COUNT: 15.2 % (ref 11.5–14.5)
EST. GFR  (NO RACE VARIABLE): >60 ML/MIN/1.73 M^2
GLUCOSE SERPL-MCNC: 111 MG/DL (ref 70–110)
HCT VFR BLD AUTO: 38.1 % (ref 40–54)
HGB BLD-MCNC: 13 G/DL (ref 14–18)
IMM GRANULOCYTES # BLD AUTO: 0.02 K/UL (ref 0–0.04)
IMM GRANULOCYTES NFR BLD AUTO: 0.2 % (ref 0–0.5)
LYMPHOCYTES # BLD AUTO: 2.8 K/UL (ref 1–4.8)
LYMPHOCYTES NFR BLD: 31.8 % (ref 18–48)
MAGNESIUM SERPL-MCNC: 2 MG/DL (ref 1.6–2.6)
MCH RBC QN AUTO: 26.2 PG (ref 27–31)
MCHC RBC AUTO-ENTMCNC: 34.1 G/DL (ref 32–36)
MCV RBC AUTO: 77 FL (ref 82–98)
MONOCYTES # BLD AUTO: 0.8 K/UL (ref 0.3–1)
MONOCYTES NFR BLD: 9.3 % (ref 4–15)
NEUTROPHILS # BLD AUTO: 4.9 K/UL (ref 1.8–7.7)
NEUTROPHILS NFR BLD: 56.3 % (ref 38–73)
NRBC BLD-RTO: 0 /100 WBC
PHOSPHATE SERPL-MCNC: 4.1 MG/DL (ref 2.7–4.5)
PLATELET # BLD AUTO: 358 K/UL (ref 150–450)
PMV BLD AUTO: 10.9 FL (ref 9.2–12.9)
POTASSIUM SERPL-SCNC: 4.3 MMOL/L (ref 3.5–5.1)
PROT SERPL-MCNC: 7 G/DL (ref 6–8.4)
RBC # BLD AUTO: 4.97 M/UL (ref 4.6–6.2)
SODIUM SERPL-SCNC: 137 MMOL/L (ref 136–145)
WBC # BLD AUTO: 8.73 K/UL (ref 3.9–12.7)

## 2023-03-14 PROCEDURE — 25000003 PHARM REV CODE 250: Performed by: INTERNAL MEDICINE

## 2023-03-14 PROCEDURE — C1769 GUIDE WIRE: HCPCS | Performed by: INTERNAL MEDICINE

## 2023-03-14 PROCEDURE — 99152 MOD SED SAME PHYS/QHP 5/>YRS: CPT | Performed by: INTERNAL MEDICINE

## 2023-03-14 PROCEDURE — 99152 PR MOD CONSCIOUS SEDATION, SAME PHYS, 5+ YRS, FIRST 15 MIN: ICD-10-PCS | Mod: ,,, | Performed by: INTERNAL MEDICINE

## 2023-03-14 PROCEDURE — 99153 MOD SED SAME PHYS/QHP EA: CPT | Performed by: INTERNAL MEDICINE

## 2023-03-14 PROCEDURE — 93458 PR CATH PLACE/CORON ANGIO, IMG SUPER/INTERP,W LEFT HEART VENTRICULOGRAPHY: ICD-10-PCS | Mod: 26,,, | Performed by: INTERNAL MEDICINE

## 2023-03-14 PROCEDURE — 99900035 HC TECH TIME PER 15 MIN (STAT)

## 2023-03-14 PROCEDURE — 63600175 PHARM REV CODE 636 W HCPCS: Performed by: FAMILY MEDICINE

## 2023-03-14 PROCEDURE — 63600175 PHARM REV CODE 636 W HCPCS: Performed by: INTERNAL MEDICINE

## 2023-03-14 PROCEDURE — 94660 CPAP INITIATION&MGMT: CPT

## 2023-03-14 PROCEDURE — C1894 INTRO/SHEATH, NON-LASER: HCPCS | Performed by: INTERNAL MEDICINE

## 2023-03-14 PROCEDURE — C1760 CLOSURE DEV, VASC: HCPCS | Performed by: INTERNAL MEDICINE

## 2023-03-14 PROCEDURE — 25000003 PHARM REV CODE 250: Performed by: NURSE PRACTITIONER

## 2023-03-14 PROCEDURE — 25000003 PHARM REV CODE 250: Performed by: STUDENT IN AN ORGANIZED HEALTH CARE EDUCATION/TRAINING PROGRAM

## 2023-03-14 PROCEDURE — 25500020 PHARM REV CODE 255: Performed by: INTERNAL MEDICINE

## 2023-03-14 PROCEDURE — 80053 COMPREHEN METABOLIC PANEL: CPT | Performed by: NURSE PRACTITIONER

## 2023-03-14 PROCEDURE — 94799 UNLISTED PULMONARY SVC/PX: CPT

## 2023-03-14 PROCEDURE — 93458 L HRT ARTERY/VENTRICLE ANGIO: CPT | Mod: 26,,, | Performed by: INTERNAL MEDICINE

## 2023-03-14 PROCEDURE — 84100 ASSAY OF PHOSPHORUS: CPT | Performed by: NURSE PRACTITIONER

## 2023-03-14 PROCEDURE — 99152 MOD SED SAME PHYS/QHP 5/>YRS: CPT | Mod: ,,, | Performed by: INTERNAL MEDICINE

## 2023-03-14 PROCEDURE — 25000003 PHARM REV CODE 250: Performed by: HOSPITALIST

## 2023-03-14 PROCEDURE — 83735 ASSAY OF MAGNESIUM: CPT | Performed by: NURSE PRACTITIONER

## 2023-03-14 PROCEDURE — 36415 COLL VENOUS BLD VENIPUNCTURE: CPT | Performed by: NURSE PRACTITIONER

## 2023-03-14 PROCEDURE — 21400001 HC TELEMETRY ROOM

## 2023-03-14 PROCEDURE — 99233 SBSQ HOSP IP/OBS HIGH 50: CPT | Mod: ,,, | Performed by: INTERNAL MEDICINE

## 2023-03-14 PROCEDURE — 85025 COMPLETE CBC W/AUTO DIFF WBC: CPT | Performed by: NURSE PRACTITIONER

## 2023-03-14 PROCEDURE — 99233 PR SUBSEQUENT HOSPITAL CARE,LEVL III: ICD-10-PCS | Mod: ,,, | Performed by: INTERNAL MEDICINE

## 2023-03-14 PROCEDURE — 94761 N-INVAS EAR/PLS OXIMETRY MLT: CPT

## 2023-03-14 PROCEDURE — 93458 L HRT ARTERY/VENTRICLE ANGIO: CPT | Performed by: INTERNAL MEDICINE

## 2023-03-14 PROCEDURE — C1887 CATHETER, GUIDING: HCPCS | Performed by: INTERNAL MEDICINE

## 2023-03-14 DEVICE — IMPLANTABLE DEVICE: Type: IMPLANTABLE DEVICE | Site: GROIN | Status: FUNCTIONAL

## 2023-03-14 RX ORDER — NITROGLYCERIN 0.4 MG/1
0.4 TABLET SUBLINGUAL EVERY 5 MIN PRN
Status: DISCONTINUED | OUTPATIENT
Start: 2023-03-14 | End: 2023-03-16 | Stop reason: HOSPADM

## 2023-03-14 RX ORDER — MIDAZOLAM HYDROCHLORIDE 1 MG/ML
INJECTION INTRAMUSCULAR; INTRAVENOUS
Status: DISCONTINUED | OUTPATIENT
Start: 2023-03-14 | End: 2023-03-14 | Stop reason: HOSPADM

## 2023-03-14 RX ORDER — SODIUM CHLORIDE 450 MG/100ML
INJECTION, SOLUTION INTRAVENOUS CONTINUOUS
Status: ACTIVE | OUTPATIENT
Start: 2023-03-14 | End: 2023-03-14

## 2023-03-14 RX ORDER — LIDOCAINE HYDROCHLORIDE 10 MG/ML
INJECTION INFILTRATION; PERINEURAL
Status: DISCONTINUED | OUTPATIENT
Start: 2023-03-14 | End: 2023-03-14 | Stop reason: HOSPADM

## 2023-03-14 RX ORDER — FENTANYL CITRATE 50 UG/ML
INJECTION, SOLUTION INTRAMUSCULAR; INTRAVENOUS
Status: DISCONTINUED | OUTPATIENT
Start: 2023-03-14 | End: 2023-03-14 | Stop reason: HOSPADM

## 2023-03-14 RX ORDER — ACETAMINOPHEN 325 MG/1
650 TABLET ORAL EVERY 4 HOURS PRN
Status: DISCONTINUED | OUTPATIENT
Start: 2023-03-14 | End: 2023-03-16 | Stop reason: HOSPADM

## 2023-03-14 RX ORDER — ONDANSETRON 4 MG/1
8 TABLET, ORALLY DISINTEGRATING ORAL EVERY 8 HOURS PRN
Status: DISCONTINUED | OUTPATIENT
Start: 2023-03-14 | End: 2023-03-16 | Stop reason: HOSPADM

## 2023-03-14 RX ADMIN — CALCIUM CARBONATE (ANTACID) CHEW TAB 500 MG 500 MG: 500 CHEW TAB at 12:03

## 2023-03-14 RX ADMIN — ENOXAPARIN SODIUM 40 MG: 100 INJECTION SUBCUTANEOUS at 08:03

## 2023-03-14 RX ADMIN — ATORVASTATIN CALCIUM 20 MG: 20 TABLET, FILM COATED ORAL at 08:03

## 2023-03-14 RX ADMIN — FAMOTIDINE 20 MG: 20 TABLET ORAL at 08:03

## 2023-03-14 RX ADMIN — FAMOTIDINE 20 MG: 20 TABLET ORAL at 12:03

## 2023-03-14 RX ADMIN — ASPIRIN 81 MG CHEWABLE TABLET 324 MG: 81 TABLET CHEWABLE at 08:03

## 2023-03-14 RX ADMIN — ISOSORBIDE MONONITRATE 30 MG: 30 TABLET, EXTENDED RELEASE ORAL at 12:03

## 2023-03-14 RX ADMIN — METOPROLOL TARTRATE 25 MG: 25 TABLET, FILM COATED ORAL at 08:03

## 2023-03-14 RX ADMIN — SACUBITRIL AND VALSARTAN 1 TABLET: 24; 26 TABLET, FILM COATED ORAL at 08:03

## 2023-03-14 RX ADMIN — SPIRONOLACTONE 25 MG: 25 TABLET, FILM COATED ORAL at 12:03

## 2023-03-14 RX ADMIN — SACUBITRIL AND VALSARTAN 1 TABLET: 24; 26 TABLET, FILM COATED ORAL at 12:03

## 2023-03-14 RX ADMIN — SODIUM CHLORIDE: 0.45 INJECTION, SOLUTION INTRAVENOUS at 09:03

## 2023-03-14 RX ADMIN — METOPROLOL TARTRATE 25 MG: 25 TABLET, FILM COATED ORAL at 12:03

## 2023-03-14 RX ADMIN — DIPHENHYDRAMINE HYDROCHLORIDE 50 MG: 25 CAPSULE ORAL at 08:03

## 2023-03-14 NOTE — PLAN OF CARE
03/14/23 0928   Post-Acute Status   Post-Acute Authorization E   [facility] Status Referrals Sent     Lifevest orders placed by Cardiology. Case management sent orders to Nafisa at [facility] via [facility]. CM following     [id number] - cath report faxed to [facility], per [facility] request.

## 2023-03-14 NOTE — PROGRESS NOTES
[facility]  Department of Cardiology  Progress Note      PATIENT NAME: Wenceslao Jeong  MRN: [id number]  TODAY'S DATE: 03/14/2023  ADMIT DATE: 3/7/2023                          CONSULT REQUESTED BY: Andrea Corrales MD    SUBJECTIVE     PRINCIPAL PROBLEM: Acute combined systolic and diastolic congestive heart failure      REASON FOR CONSULT:    Newly diagnosed systolic HF      INTERVAL HISTORY:  3/14/23  S/P angiogram, normal coronaries found; Await life vest; Pt denies any recent CP, SOB, fluid retention  Sinus tachycardia 114 bpm      3/13/23  Pt resting in bed; denies recent shortness of breath; No MEJIA; no swelling in abdomen or legs; creatinine 1.1 today;     3/12/23:  Pt resting in chair, ambulatory in room; denies dyspnea, chest pain, fluid retention; Breathing comfortably on room air; NSR on tele w/o acute events overnight; Creatinine 1.1    3/11/23:  Pt OOB in chair; denies dyspnea or cough; edema improved;   Creatinine improved today, 1.2; VSS, NSR on telemetry w/o any acute events overnight    3/10/23  Patient is resting comfortably during examination.  No acute distress.  Alert and oriented.  Vital signs stable.  Patient was supposed to have angiogram this morning.  However, creatinine increased to 1.5 overnight.  At this time we will postpone angiogram to Monday morning in view of LISA.      HPI:     Patient is a 27-year-old male with past medical history of hypertension who presented to the ED with acute onset of chest pain and shortness of breath with exertion, alleviated with rest for the past 3 weeks, worsening over the past 2-3 days.  Patient also reports a cough with pink foamy sputum.  Patient was recently started on losartan 50 mg daily on February 1, 2023 by his PCP.  Patient denies fever, chills, diaphoresis, lightheadedness, dizziness, palpitations, edema or bleeding.  Chest x-ray shows right middle and lower lung pneumonia.  CT of the chest also shows multifocal pneumonia.  Blood  "pressure significantly elevated in ED and patient was started on a Tridil drip.  , echocardiogram performed with EF of 25% and grade 3 diastolic dysfunction, moderate MR/TR.  Patient was also found to be hypoxic and placed on 5 L and subsequently placed on BiPAP for worsening dyspnea.    During examination, patient is alert and oriented, on room air, no acute distress, sinus rhythm on telemetry.  Patient states he is feeling much better and denies active chest pain or shortness of breath.      FROM H&P:  HPI: Wenceslao Jeong is a 27 y.o. male with a history as  has no past medical history on file. who presented to the ED with a Chest Pain (Acute onset of chest pain during exertion. Allevaited with rest. Pt reports hx of MEJIA. Hx of HTN. )     Patient presents to the ED with a c/o SOB for about 3 weeks worsening on last 2-3 days and now with cough with pink foamy sputum. Patient reports he was seen by his PCP because he had been SOB and was told he did not have pneumonia, however was started on BP medications (losartan 50 mg daily) February 1, 2023.  Patient further states he was supposed to follow up with PCP to see if the losartan was working but he missed the appointment because his job was short and he had to fill in.      Denies fever, chills, diaphoresis, dizziness, HA, chest pain, palpitations, NVD, recent trauma or any other associated symptoms. No aggravating and alleviating factor.  Does not smoke cigarettes, drink or do illegal drugs.      Lab and imaging obtained and reviewed. CBC shows H/H 12.6/37.1 MCV 77 MCH 26.3 RDW 14.9. CMP shows Na+ 133 AG 7 glucose 112 Ca+ 8.4 T. Bili 1.7. . Initial Troponin 25.9. EKG shows sinus tachycardia; with possible left atrial enlargement. CXR shows right mid and lower lung pneumonia. On admit, temp 99.3 Hr 114 RR 16 /110 sats 95% on RA           Per ED provider, patient presented with SOB and productive cough "pink frothy sputum". CXR was completed and " [Dear  ___] : Dear  [unfilled], [Consult Letter:] : I had the pleasure of evaluating your patient, [unfilled]. showed right mid/lower PNA. Started on IV ABX. However, BNP was elevated and given symptom, a bedside Echo was completed which per ED provider more consistent with HF with EF approx 50%. BP significantly elevated tridil gtt initiated, with good BP controlled, tridil gtt discontinued. Patient was also noted to have a decrease in sats to 91% on 5 liters O2, placed on Bipap with O2 sats improving, now currently on 2L o2 per NC with O2 sats 96% on assessment.         Review of patient's allergies indicates:  No Known Allergies    No past medical history on file.  No past surgical history on file.        REVIEW OF SYSTEMS  GEN: no c/o  RESP: no coughing, dyspnea or MEJIA;  CV: No CP or fluid retention today  EXT: right groin cath site, no pain  OBJECTIVE     VITAL SIGNS (Most Recent)  Temp: 98.3 °F (36.8 °C) (03/14/23 0745)  Pulse: 96 (03/14/23 0745)  Resp: 18 (03/14/23 0745)  BP: (!) 141/82 (03/14/23 0745)  SpO2: 97 % (03/14/23 0745)    VENTILATION STATUS  Resp: 18 (03/14/23 0745)  SpO2: 97 % (03/14/23 0745)       I & O (Last 24H):  Intake/Output Summary (Last 24 hours) at 3/14/2023 0941  Last data filed at 3/13/2023 1430  Gross per 24 hour   Intake 360 ml   Output --   Net 360 ml         WEIGHTS  Wt Readings from Last 3 Encounters:   03/14/23 0333 131.9 kg (290 lb 12.6 oz)   03/11/23 0332 131.5 kg (289 lb 14.5 oz)   03/10/23 0555 129.9 kg (286 lb 6 oz)   03/09/23 0417 130.6 kg (287 lb 14.7 oz)   03/07/23 1458 (!) 138.3 kg (305 lb)   03/08/23 1353 (!) 138.3 kg (305 lb)       PHYSICAL EXAM  GENERAL: well built, obese male, breathing comfortably in no apparent distress alert and oriented.   HEENT: Normocephalic. Pupils normal and conjunctivae normal.    NECK: No JVD. No bruit..   THYROID: Thyroid not examined  CARDIAC: tachycardic rate and rhythm. S1 is normal.S2 is normal.No gallops, clicks or murmurs noted at this time.  CHEST ANATOMY: normal.   LUNGS: Clear to auscultation. No wheezing or rhonchi..   ABDOMEN: Soft.  Normal bowel sounds.  Nontender  URINARY: No serna catheter   EXTREMITIES: No cyanosis, clubbing or edema noted at this time.  CENTRAL NERVOUS SYSTEM: No focal motor or sensory deficits noted.   SKIN: Skin without lesions, moist, well perfused.   MUSCLE STRENGTH & TONE: No noteable weakness, atrophy or abnormal movement.     HOME MEDICATIONS:  No current facility-administered medications on file prior to encounter.     Current Outpatient Medications on File Prior to Encounter   Medication Sig Dispense Refill    albuterol (PROVENTIL/VENTOLIN HFA) 90 mcg/actuation inhaler Inhale 2 puffs into the lungs every 4 (four) hours as needed.      losartan (COZAAR) 50 MG tablet Take 50 mg by mouth once daily.         SCHEDULED MEDS:   aspirin  324 mg Oral Daily    atorvastatin  20 mg Oral QHS    calcium carbonate  500 mg Oral TID WM    enoxaparin  40 mg Subcutaneous Q12H    famotidine  20 mg Oral BID    isosorbide mononitrate  30 mg Oral Daily    metoprolol tartrate  25 mg Oral BID    sacubitriL-valsartan  1 tablet Oral BID    spironolactone  25 mg Oral Daily       CONTINUOUS INFUSIONS:   sodium chloride 0.45%           PRN MEDS:acetaminophen, benzonatate, diphenhydrAMINE, fentaNYL, hydrALAZINE, ibuprofen, levalbuterol, LIDOcaine HCL 10 mg/ml (1%), magnesium oxide, magnesium oxide, melatonin, midazolam, naloxone, nitroGLYCERIN, ondansetron, potassium bicarbonate, potassium bicarbonate, potassium bicarbonate, potassium, sodium phosphates, potassium, sodium phosphates, potassium, sodium phosphates, sodium chloride 0.9%, sodium chloride 0.9%    LABS AND DIAGNOSTICS     CBC LAST 3 DAYS  Recent Labs   Lab 03/12/23  0439 03/13/23  0452 03/14/23  0429   WBC 8.90 8.30 8.73   RBC 4.75 4.76 4.97   HGB 12.2* 12.7* 13.0*   HCT 37.4* 36.9* 38.1*   MCV 79* 78* 77*   MCH 25.7* 26.7* 26.2*   MCHC 32.6 34.4 34.1   RDW 14.8* 14.9* 15.2*    346 358   MPV 11.5 10.8 10.9   GRAN 42.9  3.8 52.3  4.3 56.3  4.9   LYMPH 44.4  4.0 35.3  2.9  [Please see my note below.] : Please see my note below. 31.8  2.8   MONO 9.4  0.8 9.3  0.8 9.3  0.8   BASO 0.05 0.09 0.07   NRBC 0 0 0         COAGULATION LAST 3 DAYS  Recent Labs   Lab 03/13/23  1020   INR 1.1   APTT 31.0         CHEMISTRY LAST 3 DAYS  Recent Labs   Lab 03/07/23  1734 03/08/23  0446 03/12/23  0439 03/13/23 0452 03/14/23  0429   NA  --    < > 136 137 137   K  --    < > 4.5 4.8 4.3   CL  --    < > 99 101 106   CO2  --    < > 27 25 25   ANIONGAP  --    < > 10 11 6*   BUN  --    < > 19 15 14   CREATININE  --    < > 1.1 1.1 1.1   GLU  --    < > 113* 114* 111*   CALCIUM  --    < > 9.0 8.7 8.7   PH 7.393  --   --   --   --    MG  --    < > 2.2 2.1 2.0   ALBUMIN  --    < > 3.6 3.5 3.6   PROT  --    < > 7.1 7.1 7.0   ALKPHOS  --    < > 52* 51* 54*   ALT  --    < > 26 38 41   AST  --    < > 21 34 29   BILITOT  --    < > 0.5 0.5 0.5    < > = values in this interval not displayed.         CARDIAC PROFILE LAST 3 DAYS  Recent Labs   Lab 03/07/23  1528 03/07/23  1941 03/08/23  0446 03/10/23  0427   *  --   --  135*   *  --   --   --    TROPONINIHS 25.9* 25.3* 27.1*  --          ENDOCRINE LAST 3 DAYS  Recent Labs   Lab 03/07/23 1941 03/08/23  0446   TSH  --  1.300   PROCAL 0.05  --          LAST ARTERIAL BLOOD GAS  ABG  Recent Labs   Lab 03/07/23  1734   PH 7.393   PO2 50*   PCO2 39.8   HCO3 24.3   BE -1         LAST 7 DAYS MICROBIOLOGY   Microbiology Results (last 7 days)       ** No results found for the last 168 hours. **            MOST RECENT IMAGING  Echo  Addendum: · The left ventricle is normal in size with mild eccentric hypertrophy and  severely decreased systolic function.   · The estimated ejection fraction is 25%.   · Grade III left ventricular diastolic dysfunction.   · Normal right ventricular size with normal right ventricular systolic  function.   · Mild right atrial enlargement.   · Moderate mitral regurgitation.   · Mild to moderate tricuspid regurgitation.   · Mild pulmonic regurgitation.   · Moderate left atrial  [Consult Closing:] : Thank you very much for allowing me to participate in the care of this patient.  If you have any questions, please do not hesitate to contact me. [Sincerely,] : Sincerely, enlargement.  Narrative: · The left ventricle is normal in size with mild eccentric hypertrophy and   mildly decreased systolic function.  · The estimated ejection fraction is 45%.  · Grade III left ventricular diastolic dysfunction.  · Normal right ventricular size with normal right ventricular systolic   function.  · Moderate left atrial enlargement.  · Mild right atrial enlargement.  · Moderate mitral regurgitation.  · Mild to moderate tricuspid regurgitation.  · Mild pulmonic regurgitation.     CTA Chest Non-Coronary (PE Studies)  CMS MANDATED QUALITY DATA-CT RADIATION DOSE-[id number]  All CT scans at this facility use dose modulation, iterative reconstruction, and or weight-based dosing when appropriate to reduce radiation dose to as low as reasonably achievable.    HISTORY: Pulmonary embolism (PE) suspected, unknown D-dimer    FINDINGS: Thin axial imaging through the chest was performed with 100 mL Omnipaque 350 IV contrast, with sagittal and coronal reformatted images and MIP reconstructions performed, and images stored in the patient's permanent electronic medical record.    Comparison to the chest radiograph of the prior day. There are no pulmonary arterial filling defects to suggest pulmonary thromboembolism. The central pulmonary arteries are normal in caliber.    The aorta is normal in caliber and tapers appropriately. The heart is enlarged, with dilated left ventricle. There is no pericardial effusion, with no enlarged mediastinal or hilar lymph nodes.    There are patchy airspace opacities and groundglass densities in both lungs, most pronounced in the right lower lobe with some air bronchograms, suspicious for multifocal pneumonia. There is no evidence of interstitial pulmonary edema, with no pleural effusion. The central airways are patent, with no pneumothorax.    Images of the upper abdomen are unremarkable. There are no acute fractures or destructive osseous lesions.    IMPRESSION:  1. Negative for  [FreeTextEntry3] : Miguel Angel Madden MD, FACS  pulmonary thromboembolism.  2. Scattered interstitial and airspace opacities in both lungs, suspicious for multifocal pneumonia. Viral or atypical pneumonia could have this appearance.  3. Cardiomegaly with dilated left ventricle.    Electronically signed by:  Darrin Roland MD  3/8/2023 11:38 AM [facility] Workstation: [id number]      ECHOCARDIOGRAM RESULTS (last 5)  Results for orders placed during the hospital encounter of 03/07/23    Echo    Interpretation Summary  · The left ventricle is normal in size with mild eccentric hypertrophy and severely decreased systolic function.  · The estimated ejection fraction is 25%.  · Grade III left ventricular diastolic dysfunction.  · Normal right ventricular size with normal right ventricular systolic function.  · Mild right atrial enlargement.  · Moderate mitral regurgitation.  · Mild to moderate tricuspid regurgitation.  · Mild pulmonic regurgitation.  · Moderate left atrial enlargement.      CURRENT/PREVIOUS VISIT EKG  Results for orders placed or performed during the hospital encounter of 03/07/23   EKG 12-lead    Collection Time: 03/13/23  9:47 AM    Narrative    Test Reason : I50.9,I42.9,    Vent. Rate : 107 BPM     Atrial Rate : 107 BPM     P-R Int : 166 ms          QRS Dur : 100 ms      QT Int : 368 ms       P-R-T Axes : 046 012 059 degrees     QTc Int : 491 ms    Sinus tachycardia  Possible Left atrial enlargement  Borderline Abnormal ECG  When compared with ECG of 08-MAR-2023 07:46,  Nonspecific T wave abnormality no longer evident in Inferior leads  Confirmed by Mani Marcus MD ([id number]) on 3/13/2023 4:54:59 PM    Referred By: [email or web address]   SELF           Confirmed By:Mani Marcus MD           ASSESSMENT/PLAN:     Active Hospital Problems    Diagnosis    *Acute combined systolic and diastolic congestive heart failure    Severe obesity (BMI >= 40)    Microcytic anemia    HTN (hypertension)       ASSESSMENT & PLAN:   Cardiomyopathy  New onset combined systolic and diastolic  heart failure  Acute pulmonary edema  Hypertensive urgency  Acute respiratory failure with hypoxia  Right middle/lower lobe pneumonia  Elevated troponin    RECOMMENDATIONS:    Patient presented with hypertensive emergency and new onset combined systolic and diastolic heart failure- EF of 25% with grade 3 diastolic dysfunction as well as moderate MR/TR.      Patient had downtrend in BNP and weight loss of 16 lbs since 3/8/23. Appears euvolemic. Continue daily weights, strict I&Os, 1.5 L fluid restriction.  Low-sodium heart healthy diet.    Blood pressure has remained at goal .  Continue metoprolol tartrate 25 mg b.i.d., isosorbide mononitrate 30 mg daily, spironolactone 25 mg daily and Entresto 24-26 mg BID.  May increase Entresto dosage prior to discharge or outpatient. Hold antihypertensives for systolic BP less than 100.    Angiogram revealed no occlusive coronary artery disease    5. Await life vest before discharge home. Pt will need to wear life vest x 3 months then return to office for follow up with echocardiogram        Serena Bird NP  [facility]  Department of Cardiology  Date of Service: 03/14/2023      Patient is tolerating Entresto fairly well however if the blood pressure remains elevated this could be increased to 49/51 mg b.i.d. dosing.    2. Will change metoprolol tartrate to metoprolol succinate 25 mg p.o. b.i.d.   3. May consider starting him on maintenance of Lasix at 20 mg p.o. b.i.d.  4. Continue on spironolactone 25 mg p.o. b.i.d.  5 .  When post angiographic bedrest is completed encourage ambulation on the monitor to evaluate his hemodynamic response.  6. If patient remains stable overnight he can be discharged home after titrating his medications tonight.    I have personally interviewed and examined the patient, I have reviewed the Nurse Practitioner's history and physical, assessment, and plan. I have personally evaluated the patient at bedside and agree with the  findings and made appropriate changes as necessary in recommendations.    Dr. DEIRDRE Marcus MD  Department of Cardiology  [facility]  3/14/23

## 2023-03-14 NOTE — SUBJECTIVE & OBJECTIVE
"Interval History: see "Hospital Course"    Review of Systems   Respiratory:  Negative for shortness of breath.    Cardiovascular:  Negative for chest pain, palpitations and leg swelling.   Objective:     Vital Signs (Most Recent):  Temp: 98.3 °F (36.8 °C) (03/14/23 0745)  Pulse: 90 (03/14/23 1500)  Resp: (!) 22 (03/14/23 1300)  BP: 123/88 (03/14/23 1500)  SpO2: 96 % (03/14/23 1115)   Vital Signs (24h Range):  Temp:  [97.9 °F (36.6 °C)-98.3 °F (36.8 °C)] 98.3 °F (36.8 °C)  Pulse:  [] 90  Resp:  [16-31] 22  SpO2:  [95 %-98 %] 96 %  BP: (115-148)/(58-88) 123/88     Weight: 131.9 kg (290 lb 12.6 oz)  Body mass index is 44.22 kg/m².  No intake or output data in the 24 hours ending 03/14/23 1548   Physical Exam  Vitals and nursing note reviewed.   Constitutional:       Appearance: He is obese.   HENT:      Head: Normocephalic and atraumatic.      Right Ear: External ear normal.      Left Ear: External ear normal.      Nose: Nose normal.      Mouth/Throat:      Mouth: Mucous membranes are moist.      Pharynx: Oropharynx is clear.   Eyes:      Extraocular Movements: Extraocular movements intact.      Conjunctiva/sclera: Conjunctivae normal.   Cardiovascular:      Rate and Rhythm: Normal rate and regular rhythm.      Pulses: Normal pulses.      Heart sounds: Normal heart sounds. No murmur heard.  Pulmonary:      Effort: Pulmonary effort is normal.      Breath sounds: Normal breath sounds.   Abdominal:      General: Bowel sounds are normal.      Palpations: Abdomen is soft.   Musculoskeletal:         General: Normal range of motion.      Cervical back: Normal range of motion and neck supple.      Right lower leg: No edema.      Left lower leg: No edema.   Skin:     General: Skin is warm and dry.   Neurological:      General: No focal deficit present.      Mental Status: He is oriented to person, place, and time. Mental status is at baseline.   Psychiatric:         Mood and Affect: Mood normal.         Behavior: Behavior " normal.       Significant Labs: All pertinent labs within the past 24 hours have been reviewed.    Significant Imaging: I have reviewed all pertinent imaging results/findings within the past 24 hours.

## 2023-03-14 NOTE — NURSING TRANSFER
"  Nursing Transfer Note      3/14/2023     Reason patient is being transferred: returned to inpatient room s/p left heart cath    Transfer To: Cardio B room# [id number]    Transfer via stretcher    Transfer with 2 L/min to O2, cardiac monitoring    Transported by Hilda Nava RN    Medicines sent: n/a    Any special needs or follow-up needed: continuation of strict 6 hour bed rest until 1545    Chart send with patient: Yes    Notified: friend    Patient reassessed at: Hilda Nava RN and JANNA Cardenas RN ([place]ia) 3/14/2023 at 1230 (date, time)    Upon arrival to floor: cardiac monitor applied, patient oriented to room, call bell in reach, and bed in lowest position  "

## 2023-03-14 NOTE — ASSESSMENT & PLAN NOTE
Body mass index is 44.22 kg/m². Morbid obesity complicates all aspects of disease management from diagnostic modalities to treatment.

## 2023-03-14 NOTE — PROGRESS NOTES
"[facility] Medicine  Progress Note    Patient Name: Wenceslao Jeong  MRN: [id number]  Patient Class: IP- Inpatient   Admission Date: 3/7/2023  Length of Stay: 7 days  Attending Physician: Andrea Corrales MD  Primary Care Provider: BELKYS Wheeler        Subjective:     Principal Problem:Acute combined systolic and diastolic congestive heart failure        HPI:  Wenceslao Jeong is a 27 y.o. male with a history as  has no past medical history on file. who presented to the ED with a Chest Pain (Acute onset of chest pain during exertion. Allevaited with rest. Pt reports hx of MEJIA. Hx of HTN. )    Patient presents to the ED with a c/o SOB for about 3 weeks worsening on last 2-3 days and now with cough with pink foamy sputum. Patient reports he was seen by his PCP because he had been SOB and was told he did not have pneumonia, however was started on BP medications (losartan 50 mg daily) February 1, 2023.  Patient further states he was supposed to follow up with PCP to see if the losartan was working but he missed the appointment because his job was short and he had to fill in.     Denies fever, chills, diaphoresis, dizziness, HA, chest pain, palpitations, NVD, recent trauma or any other associated symptoms. No aggravating and alleviating factor.  Does not smoke cigarettes, drink or do illegal drugs.     Lab and imaging obtained and reviewed. CBC shows H/H 12.6/37.1 MCV 77 MCH 26.3 RDW 14.9. CMP shows Na+ 133 AG 7 glucose 112 Ca+ 8.4 T. Bili 1.7. . Initial Troponin 25.9. EKG shows sinus tachycardia; with possible left atrial enlargement. CXR shows right mid and lower lung pneumonia. On admit, temp 99.3 Hr 114 RR 16 /110 sats 95% on RA         Per ED provider, patient presented with SOB and productive cough "pink frothy sputum". CXR was completed and showed right mid/lower PNA. Started on IV ABX. However, BNP was elevated and given symptom, a bedside Echo was completed which per ED " "provider more consistent with HF with EF approx 50%. BP significantly elevated tridil gtt initiated, with good BP controlled, tridil gtt discontinued. Patient was also noted to have a decrease in sats to 91% on 5 liters O2, placed on Bipap with O2 sats improving, now currently on 2L o2 per NC with O2 sats 96% on assessment.                Overview/Hospital Course:  Wenceslao Jeong is a 27 year old male with a past medical history of obesity and HTN who presented with shortness of breath, chest pain, and productive cough secondary to a new onset combined congestive heart failure in the setting of hypertensive emergency with demand ischemia. His blood pressure was able to be controlled with a nitrate infusion transitioned to PO medications. He symptoms also improved with IV diuretics. Cardiology was consulted. TTE showed EF of 25% with grade III diastolic dysfunction as well as MR and TR. He is currently stable on room air and off diuretics. Entresto, Aldactone, isosorbide mononitrate and metoprolol have been started. He underwent coronary angiogram 3/14 which showed no evidence of coronary artery disease. He will need a Life Vest on discharge.      Interval History: see "Hospital Course"    Review of Systems   Respiratory:  Negative for shortness of breath.    Cardiovascular:  Negative for chest pain, palpitations and leg swelling.   Objective:     Vital Signs (Most Recent):  Temp: 98.3 °F (36.8 °C) (03/14/23 0745)  Pulse: 90 (03/14/23 1500)  Resp: (!) 22 (03/14/23 1300)  BP: 123/88 (03/14/23 1500)  SpO2: 96 % (03/14/23 1115)   Vital Signs (24h Range):  Temp:  [97.9 °F (36.6 °C)-98.3 °F (36.8 °C)] 98.3 °F (36.8 °C)  Pulse:  [] 90  Resp:  [16-31] 22  SpO2:  [95 %-98 %] 96 %  BP: (115-148)/(58-88) 123/88     Weight: 131.9 kg (290 lb 12.6 oz)  Body mass index is 44.22 kg/m².  No intake or output data in the 24 hours ending 03/14/23 1548   Physical Exam  Vitals and nursing note reviewed.   Constitutional:       " Appearance: He is obese.   HENT:      Head: Normocephalic and atraumatic.      Right Ear: External ear normal.      Left Ear: External ear normal.      Nose: Nose normal.      Mouth/Throat:      Mouth: Mucous membranes are moist.      Pharynx: Oropharynx is clear.   Eyes:      Extraocular Movements: Extraocular movements intact.      Conjunctiva/sclera: Conjunctivae normal.   Cardiovascular:      Rate and Rhythm: Normal rate and regular rhythm.      Pulses: Normal pulses.      Heart sounds: Normal heart sounds. No murmur heard.  Pulmonary:      Effort: Pulmonary effort is normal.      Breath sounds: Normal breath sounds.   Abdominal:      General: Bowel sounds are normal.      Palpations: Abdomen is soft.   Musculoskeletal:         General: Normal range of motion.      Cervical back: Normal range of motion and neck supple.      Right lower leg: No edema.      Left lower leg: No edema.   Skin:     General: Skin is warm and dry.   Neurological:      General: No focal deficit present.      Mental Status: He is oriented to person, place, and time. Mental status is at baseline.   Psychiatric:         Mood and Affect: Mood normal.         Behavior: Behavior normal.       Significant Labs: All pertinent labs within the past 24 hours have been reviewed.    Significant Imaging: I have reviewed all pertinent imaging results/findings within the past 24 hours.      Assessment/Plan:      * Acute combined systolic and diastolic congestive heart failure  TTE shows EF 25% and grade III diastolic dysfunction. Non ischemic based on angiogram 3/14. May be related to patient's uncontrolled HTN.  -Continue Entresto, metoprolol and Aldcatone  -Telemetry  -Cardiology consulted  -Strict I's and O's  -Keep K > 4 and Mg > 2  -Will need Life Vest on discharge        Microcytic anemia  Ferritin within normal limits  -Trend Hgb with CBC  -May need Hgb electrophoresis in the outpatient setting    Severe obesity (BMI >= 40)  Body mass index is  44.22 kg/m². Morbid obesity complicates all aspects of disease management from diagnostic modalities to treatment.        HTN (hypertension)  Controlled with nitrate, BB, Entresto and Aldactone.  -Patient may benefit from secondary HTN workup; consider renal artery ultrasound with Doppler as well as renin/angiotensin ration off Aldactone  -Continue to monitor        VTE Risk Mitigation (From admission, onward)         Ordered     enoxaparin injection 40 mg  Every 12 hours         03/07/23 1938     Place BRANDIE hose  Until discontinued         03/07/23 1930     IP VTE HIGH RISK PATIENT  Once         03/07/23 1930     Place sequential compression device  Until discontinued         03/07/23 1930                Discharge Planning   CAROLINE: 3/15/2023     Code Status: Full Code   Is the patient medically ready for discharge?:     Reason for patient still in hospital (select all that apply): Consult recommendations and Pending disposition  Discharge Plan A: Home with family   Discharge Delays: None known at this time              Andrea Corrales MD  Department of Hospital Medicine   Formerly [facility], [facility]

## 2023-03-14 NOTE — ASSESSMENT & PLAN NOTE
Patient with Hypoxic Respiratory failure which is Acute.  he is not on home oxygen. Supplemental oxygen was provided and noted-  .   Signs/symptoms of respiratory failure include- respiratory distress. Contributing diagnoses includes - Pneumonia +/- acute pulmonary edema.  Labs and images were reviewed. Patient Has recent ABG, which has been reviewed. Will treat underlying causes and adjust management of respiratory failure as follows- Bipap PRN  O2 PRN - keep sats >93%, Pulse oximetry q 4 with vital signs  Nebulizer treatments q8 hours   IS q2 while awake

## 2023-03-14 NOTE — ASSESSMENT & PLAN NOTE
Patient has a current diagnosis of hypertensive urgency (without evidence of end organ damage) which is controlled.  Latest blood pressure and vitals reviewed-   Temp:  [97.9 °F (36.6 °C)-98.3 °F (36.8 °C)]   Pulse:  []   Resp:  [16-31]   BP: (115-148)/(58-88)   SpO2:  [95 %-98 %] .   Patient currently off IV antihypertensives.   Home meds for hypertension were reviewed and noted below.   Hypertension Medications             losartan (COZAAR) 50 MG tablet Take 50 mg by mouth once daily.          Medication adjustment for hospital antihypertensives is as follows- Losartan discontinued (see above), started on CCB and diuretic.     Will aim for controlled BP reduction by medications noted above. Monitor and mitigate end organ damage as indicated.

## 2023-03-14 NOTE — ASSESSMENT & PLAN NOTE
TTE shows EF 25% and grade III diastolic dysfunction. Non ischemic based on angiogram 3/14. May be related to patient's uncontrolled HTN.  -Continue Entresto, metoprolol and Aldcatone  -Telemetry  -Cardiology consulted  -Strict I's and O's  -Keep K > 4 and Mg > 2  -Will need Life Vest on discharge

## 2023-03-15 LAB
ANION GAP SERPL CALC-SCNC: 8 MMOL/L (ref 8–16)
BASOPHILS # BLD AUTO: 0.05 K/UL (ref 0–0.2)
BASOPHILS NFR BLD: 0.5 % (ref 0–1.9)
BUN SERPL-MCNC: 14 MG/DL (ref 6–20)
CALCIUM SERPL-MCNC: 8.6 MG/DL (ref 8.7–10.5)
CHLORIDE SERPL-SCNC: 104 MMOL/L (ref 95–110)
CO2 SERPL-SCNC: 24 MMOL/L (ref 23–29)
CREAT SERPL-MCNC: 1.2 MG/DL (ref 0.5–1.4)
DIFFERENTIAL METHOD: ABNORMAL
EOSINOPHIL # BLD AUTO: 0.1 K/UL (ref 0–0.5)
EOSINOPHIL NFR BLD: 1.2 % (ref 0–8)
ERYTHROCYTE [DISTWIDTH] IN BLOOD BY AUTOMATED COUNT: 15.3 % (ref 11.5–14.5)
EST. GFR  (NO RACE VARIABLE): >60 ML/MIN/1.73 M^2
GLUCOSE SERPL-MCNC: 108 MG/DL (ref 70–110)
HCT VFR BLD AUTO: 38.9 % (ref 40–54)
HGB BLD-MCNC: 13 G/DL (ref 14–18)
IMM GRANULOCYTES # BLD AUTO: 0.02 K/UL (ref 0–0.04)
IMM GRANULOCYTES NFR BLD AUTO: 0.2 % (ref 0–0.5)
LYMPHOCYTES # BLD AUTO: 2.9 K/UL (ref 1–4.8)
LYMPHOCYTES NFR BLD: 31.3 % (ref 18–48)
MAGNESIUM SERPL-MCNC: 2.1 MG/DL (ref 1.6–2.6)
MCH RBC QN AUTO: 26.1 PG (ref 27–31)
MCHC RBC AUTO-ENTMCNC: 33.4 G/DL (ref 32–36)
MCV RBC AUTO: 78 FL (ref 82–98)
MONOCYTES # BLD AUTO: 1 K/UL (ref 0.3–1)
MONOCYTES NFR BLD: 10.2 % (ref 4–15)
NEUTROPHILS # BLD AUTO: 5.3 K/UL (ref 1.8–7.7)
NEUTROPHILS NFR BLD: 56.6 % (ref 38–73)
NRBC BLD-RTO: 0 /100 WBC
PHOSPHATE SERPL-MCNC: 4.3 MG/DL (ref 2.7–4.5)
PLATELET # BLD AUTO: 337 K/UL (ref 150–450)
PMV BLD AUTO: 10.8 FL (ref 9.2–12.9)
POTASSIUM SERPL-SCNC: 4.3 MMOL/L (ref 3.5–5.1)
RBC # BLD AUTO: 4.98 M/UL (ref 4.6–6.2)
SODIUM SERPL-SCNC: 136 MMOL/L (ref 136–145)
WBC # BLD AUTO: 9.32 K/UL (ref 3.9–12.7)

## 2023-03-15 PROCEDURE — 85025 COMPLETE CBC W/AUTO DIFF WBC: CPT | Performed by: NURSE PRACTITIONER

## 2023-03-15 PROCEDURE — 36415 COLL VENOUS BLD VENIPUNCTURE: CPT | Performed by: NURSE PRACTITIONER

## 2023-03-15 PROCEDURE — 80048 BASIC METABOLIC PNL TOTAL CA: CPT | Performed by: STUDENT IN AN ORGANIZED HEALTH CARE EDUCATION/TRAINING PROGRAM

## 2023-03-15 PROCEDURE — 25000003 PHARM REV CODE 250: Performed by: STUDENT IN AN ORGANIZED HEALTH CARE EDUCATION/TRAINING PROGRAM

## 2023-03-15 PROCEDURE — 99900031 HC PATIENT EDUCATION (STAT)

## 2023-03-15 PROCEDURE — 84100 ASSAY OF PHOSPHORUS: CPT | Performed by: NURSE PRACTITIONER

## 2023-03-15 PROCEDURE — 25000003 PHARM REV CODE 250: Performed by: NURSE PRACTITIONER

## 2023-03-15 PROCEDURE — 25000003 PHARM REV CODE 250: Performed by: HOSPITALIST

## 2023-03-15 PROCEDURE — 94761 N-INVAS EAR/PLS OXIMETRY MLT: CPT

## 2023-03-15 PROCEDURE — 83735 ASSAY OF MAGNESIUM: CPT | Performed by: NURSE PRACTITIONER

## 2023-03-15 PROCEDURE — 99900035 HC TECH TIME PER 15 MIN (STAT)

## 2023-03-15 PROCEDURE — 63600175 PHARM REV CODE 636 W HCPCS: Performed by: FAMILY MEDICINE

## 2023-03-15 PROCEDURE — 21400001 HC TELEMETRY ROOM

## 2023-03-15 PROCEDURE — 94660 CPAP INITIATION&MGMT: CPT

## 2023-03-15 RX ADMIN — ATORVASTATIN CALCIUM 20 MG: 20 TABLET, FILM COATED ORAL at 08:03

## 2023-03-15 RX ADMIN — CALCIUM CARBONATE (ANTACID) CHEW TAB 500 MG 500 MG: 500 CHEW TAB at 05:03

## 2023-03-15 RX ADMIN — ISOSORBIDE MONONITRATE 30 MG: 30 TABLET, EXTENDED RELEASE ORAL at 09:03

## 2023-03-15 RX ADMIN — IBUPROFEN 600 MG: 400 TABLET ORAL at 05:03

## 2023-03-15 RX ADMIN — METOPROLOL TARTRATE 25 MG: 25 TABLET, FILM COATED ORAL at 09:03

## 2023-03-15 RX ADMIN — METOPROLOL TARTRATE 25 MG: 25 TABLET, FILM COATED ORAL at 08:03

## 2023-03-15 RX ADMIN — ENOXAPARIN SODIUM 40 MG: 100 INJECTION SUBCUTANEOUS at 08:03

## 2023-03-15 RX ADMIN — FAMOTIDINE 20 MG: 20 TABLET ORAL at 09:03

## 2023-03-15 RX ADMIN — CALCIUM CARBONATE (ANTACID) CHEW TAB 500 MG 500 MG: 500 CHEW TAB at 07:03

## 2023-03-15 RX ADMIN — FAMOTIDINE 20 MG: 20 TABLET ORAL at 08:03

## 2023-03-15 RX ADMIN — SACUBITRIL AND VALSARTAN 1 TABLET: 24; 26 TABLET, FILM COATED ORAL at 09:03

## 2023-03-15 RX ADMIN — SACUBITRIL AND VALSARTAN 1 TABLET: 24; 26 TABLET, FILM COATED ORAL at 08:03

## 2023-03-15 RX ADMIN — CALCIUM CARBONATE (ANTACID) CHEW TAB 500 MG 500 MG: 500 CHEW TAB at 11:03

## 2023-03-15 RX ADMIN — ASPIRIN 81 MG CHEWABLE TABLET 324 MG: 81 TABLET CHEWABLE at 09:03

## 2023-03-15 RX ADMIN — ENOXAPARIN SODIUM 40 MG: 100 INJECTION SUBCUTANEOUS at 09:03

## 2023-03-15 RX ADMIN — SPIRONOLACTONE 25 MG: 25 TABLET, FILM COATED ORAL at 09:03

## 2023-03-15 NOTE — PLAN OF CARE
03/15/23 0941   Post-Acute Status   Post-Acute Authorization HME   HME Status Pending payor review     Per Nafisa at [employer], Lifevest still pending authorization. Case Management left detailed voicemail with EDSON Deluca  to notify of need of lifevest for discharge. CM awaiting payor auth at this time.     1544 - Case management called BOBBY Deluca to inquire about lifevest. Per rep, lifevest DENIED. Case management set up peer to peer scheduled for today at 1700. Dr. Corrales notified via secure chat.

## 2023-03-15 NOTE — ASSESSMENT & PLAN NOTE
TTE shows EF 25% and grade III diastolic dysfunction. Non ischemic based on angiogram 3/14. May be related to patient's uncontrolled HTN.  -Continue Entresto, metoprolol and Aldcatone  -Telemetry  -Cardiology consulted  -Strict I's and O's  -Keep K > 4 and Mg > 2  -Will need Life Vest on discharge; appeal of insurance companies decision to not cover device to be implemented

## 2023-03-15 NOTE — PROGRESS NOTES
"[facility] Medicine  Progress Note    Patient Name: Wenceslao Jeong  MRN: [id number]  Patient Class: IP- Inpatient   Admission Date: 3/7/2023  Length of Stay: 8 days  Attending Physician: Andrea Corrales MD  Primary Care Provider: BELKYS Wheeler        Subjective:     Principal Problem:Acute combined systolic and diastolic congestive heart failure        HPI:  Wenceslao Jeong is a 27 y.o. male with a history as  has no past medical history on file. who presented to the ED with a Chest Pain (Acute onset of chest pain during exertion. Allevaited with rest. Pt reports hx of MEJIA. Hx of HTN. )    Patient presents to the ED with a c/o SOB for about 3 weeks worsening on last 2-3 days and now with cough with pink foamy sputum. Patient reports he was seen by his PCP because he had been SOB and was told he did not have pneumonia, however was started on BP medications (losartan 50 mg daily) February 1, 2023.  Patient further states he was supposed to follow up with PCP to see if the losartan was working but he missed the appointment because his job was short and he had to fill in.     Denies fever, chills, diaphoresis, dizziness, HA, chest pain, palpitations, NVD, recent trauma or any other associated symptoms. No aggravating and alleviating factor.  Does not smoke cigarettes, drink or do illegal drugs.     Lab and imaging obtained and reviewed. CBC shows H/H 12.6/37.1 MCV 77 MCH 26.3 RDW 14.9. CMP shows Na+ 133 AG 7 glucose 112 Ca+ 8.4 T. Bili 1.7. . Initial Troponin 25.9. EKG shows sinus tachycardia; with possible left atrial enlargement. CXR shows right mid and lower lung pneumonia. On admit, temp 99.3 Hr 114 RR 16 /110 sats 95% on RA         Per ED provider, patient presented with SOB and productive cough "pink frothy sputum". CXR was completed and showed right mid/lower PNA. Started on IV ABX. However, BNP was elevated and given symptom, a bedside Echo was completed which per ED " "provider more consistent with HF with EF approx 50%. BP significantly elevated tridil gtt initiated, with good BP controlled, tridil gtt discontinued. Patient was also noted to have a decrease in sats to 91% on 5 liters O2, placed on Bipap with O2 sats improving, now currently on 2L o2 per NC with O2 sats 96% on assessment.                Overview/Hospital Course:  Wenceslao Jeong is a 27 year old male with a past medical history of obesity and HTN who presented with shortness of breath, chest pain, and productive cough secondary to a new onset combined congestive heart failure in the setting of hypertensive emergency with demand ischemia. His blood pressure was able to be controlled with a nitrate infusion transitioned to PO medications. He symptoms also improved with IV diuretics. Cardiology was consulted. TTE showed EF of 25% with grade III diastolic dysfunction as well as MR and TR. He is currently stable on room air and off diuretics. Entresto, Aldactone, isosorbide mononitrate and metoprolol have been started. He underwent coronary angiogram 3/14 which showed no evidence of coronary artery disease. He will need a Life Vest on discharge; however, his insurance policy/coverage has decided that they will not cover the cost of this device. A peer to peer meeting took place with continued denial of the device; an appeal is underway.      Interval History: see "Hospital Course"    Review of Systems   Respiratory:  Negative for shortness of breath.    Cardiovascular:  Negative for chest pain, palpitations and leg swelling.   Objective:     Vital Signs (Most Recent):  Temp: 97.9 °F (36.6 °C) (03/15/23 1500)  Pulse: 94 (03/15/23 1500)  Resp: 17 (03/15/23 1500)  BP: 133/64 (03/15/23 1500)  SpO2: 98 % (03/15/23 1500) Vital Signs (24h Range):  Temp:  [97.4 °F (36.3 °C)-99.2 °F (37.3 °C)] 97.9 °F (36.6 °C)  Pulse:  [] 94  Resp:  [15-31] 17  SpO2:  [94 %-98 %] 98 %  BP: (116-133)/(58-76) 133/64     Weight: 131.8 kg " (290 lb 9.1 oz)  Body mass index is 44.19 kg/m².  No intake or output data in the 24 hours ending 03/15/23 1828   Physical Exam  Vitals and nursing note reviewed.   Constitutional:       Appearance: He is obese.   HENT:      Head: Normocephalic and atraumatic.      Right Ear: External ear normal.      Left Ear: External ear normal.      Nose: Nose normal.      Mouth/Throat:      Mouth: Mucous membranes are moist.      Pharynx: Oropharynx is clear.   Eyes:      Extraocular Movements: Extraocular movements intact.      Conjunctiva/sclera: Conjunctivae normal.   Cardiovascular:      Rate and Rhythm: Normal rate and regular rhythm.      Pulses: Normal pulses.      Heart sounds: Normal heart sounds. No murmur heard.  Pulmonary:      Effort: Pulmonary effort is normal.      Breath sounds: Normal breath sounds.   Abdominal:      General: Bowel sounds are normal.      Palpations: Abdomen is soft.   Musculoskeletal:         General: Normal range of motion.      Cervical back: Normal range of motion and neck supple.      Right lower leg: No edema.      Left lower leg: No edema.   Skin:     General: Skin is warm and dry.   Neurological:      General: No focal deficit present.      Mental Status: He is oriented to person, place, and time. Mental status is at baseline.   Psychiatric:         Mood and Affect: Mood normal.         Behavior: Behavior normal.       Significant Labs: All pertinent labs within the past 24 hours have been reviewed.    Significant Imaging: I have reviewed all pertinent imaging results/findings within the past 24 hours.      Assessment/Plan:      * Acute combined systolic and diastolic congestive heart failure  TTE shows EF 25% and grade III diastolic dysfunction. Non ischemic based on angiogram 3/14. May be related to patient's uncontrolled HTN.  -Continue Entresto, metoprolol and Aldcatone  -Telemetry  -Cardiology consulted  -Strict I's and O's  -Keep K > 4 and Mg > 2  -Will need Life Vest on  discharge; appeal of insurance companies decision to not cover device to be implemented        Microcytic anemia  Ferritin within normal limits  -Trend Hgb with CBC  -May need Hgb electrophoresis in the outpatient setting    Severe obesity (BMI >= 40)  Body mass index is 44.19 kg/m². Morbid obesity complicates all aspects of disease management from diagnostic modalities to treatment.        HTN (hypertension)  Controlled with nitrate, BB, Entresto and Aldactone.  -Patient may benefit from secondary HTN workup; consider renal artery ultrasound with Doppler as well as renin/angiotensin ration off Aldactone  -Continue to monitor        VTE Risk Mitigation (From admission, onward)         Ordered     enoxaparin injection 40 mg  Every 12 hours         03/07/23 1938     Place BRANDIE hose  Until discontinued         03/07/23 1930     IP VTE HIGH RISK PATIENT  Once         03/07/23 1930     Place sequential compression device  Until discontinued         03/07/23 1930                Discharge Planning   CAROLINE: 3/16/2023     Code Status: Full Code   Is the patient medically ready for discharge?:     Reason for patient still in hospital (select all that apply): Pending disposition  Discharge Plan A: Home with family   Discharge Delays: None known at this time              Andrea Corrales MD  Department of Hospital Medicine   [facility]

## 2023-03-15 NOTE — ASSESSMENT & PLAN NOTE
Patient has a current diagnosis of hypertensive urgency (without evidence of end organ damage) which is controlled.  Latest blood pressure and vitals reviewed-   Temp:  [97.4 °F (36.3 °C)-99.2 °F (37.3 °C)]   Pulse:  []   Resp:  [15-31]   BP: (116-133)/(58-76)   SpO2:  [94 %-98 %] .   Patient currently off IV antihypertensives.   Home meds for hypertension were reviewed and noted below.   Hypertension Medications             losartan (COZAAR) 50 MG tablet Take 50 mg by mouth once daily.          Medication adjustment for hospital antihypertensives is as follows- Losartan discontinued (see above), started on CCB and diuretic.     Will aim for controlled BP reduction by medications noted above. Monitor and mitigate end organ damage as indicated.

## 2023-03-15 NOTE — SUBJECTIVE & OBJECTIVE
"Interval History: see "Hospital Course"    Review of Systems   Respiratory:  Negative for shortness of breath.    Cardiovascular:  Negative for chest pain, palpitations and leg swelling.   Objective:     Vital Signs (Most Recent):  Temp: 97.9 °F (36.6 °C) (03/15/23 1500)  Pulse: 94 (03/15/23 1500)  Resp: 17 (03/15/23 1500)  BP: 133/64 (03/15/23 1500)  SpO2: 98 % (03/15/23 1500) Vital Signs (24h Range):  Temp:  [97.4 °F (36.3 °C)-99.2 °F (37.3 °C)] 97.9 °F (36.6 °C)  Pulse:  [] 94  Resp:  [15-31] 17  SpO2:  [94 %-98 %] 98 %  BP: (116-133)/(58-76) 133/64     Weight: 131.8 kg (290 lb 9.1 oz)  Body mass index is 44.19 kg/m².  No intake or output data in the 24 hours ending 03/15/23 1828   Physical Exam  Vitals and nursing note reviewed.   Constitutional:       Appearance: He is obese.   HENT:      Head: Normocephalic and atraumatic.      Right Ear: External ear normal.      Left Ear: External ear normal.      Nose: Nose normal.      Mouth/Throat:      Mouth: Mucous membranes are moist.      Pharynx: Oropharynx is clear.   Eyes:      Extraocular Movements: Extraocular movements intact.      Conjunctiva/sclera: Conjunctivae normal.   Cardiovascular:      Rate and Rhythm: Normal rate and regular rhythm.      Pulses: Normal pulses.      Heart sounds: Normal heart sounds. No murmur heard.  Pulmonary:      Effort: Pulmonary effort is normal.      Breath sounds: Normal breath sounds.   Abdominal:      General: Bowel sounds are normal.      Palpations: Abdomen is soft.   Musculoskeletal:         General: Normal range of motion.      Cervical back: Normal range of motion and neck supple.      Right lower leg: No edema.      Left lower leg: No edema.   Skin:     General: Skin is warm and dry.   Neurological:      General: No focal deficit present.      Mental Status: He is oriented to person, place, and time. Mental status is at baseline.   Psychiatric:         Mood and Affect: Mood normal.         Behavior: Behavior " normal.       Significant Labs: All pertinent labs within the past 24 hours have been reviewed.    Significant Imaging: I have reviewed all pertinent imaging results/findings within the past 24 hours.

## 2023-03-15 NOTE — ASSESSMENT & PLAN NOTE
Body mass index is 44.19 kg/m². Morbid obesity complicates all aspects of disease management from diagnostic modalities to treatment.

## 2023-03-15 NOTE — PLAN OF CARE
Problem: Adult Inpatient Plan of Care  Goal: Plan of Care Review  Outcome: Ongoing, Progressing  Goal: Patient-Specific Goal (Individualized)  Outcome: Ongoing, Progressing  Goal: Absence of Hospital-Acquired Illness or Injury  Outcome: Ongoing, Progressing     Problem: Bariatric Environmental Safety  Goal: Safety Maintained with Care  Outcome: Ongoing, Progressing     Problem: Fluid Imbalance (Pneumonia)  Goal: Fluid Balance  Outcome: Ongoing, Progressing

## 2023-03-15 NOTE — RESPIRATORY THERAPY
03/14/23 4303   Patient Assessment/Suction   Level of Consciousness (AVPU) alert   Respiratory Effort Normal;Unlabored   Expansion/Accessory Muscles/Retractions no retractions;no use of accessory muscles   All Lung Fields Breath Sounds Anterior:;Lateral:;clear;equal bilaterally   Rhythm/Pattern, Respiratory no shortness of breath reported;pattern regular;depth regular;unlabored   Skin Integrity   $ Wound Care Tech Time 15 min   Area Observed Cheek;Bridge of nose;Upper lip;Lower lip;Chin;Forehead;Back of head   Skin Appearance without discoloration   PRE-TX-O2   Device (Oxygen Therapy) room air   Oxygen Concentration (%) 21   SpO2 98 %   Pulse Oximetry Type Intermittent   $ Pulse Oximetry - Multiple Charge Pulse Oximetry - Multiple   Pulse 98   Resp (!) 31   Aerosol Therapy   $ Aerosol Therapy Charges PRN treatment not required   Daily Review of Necessity (SVN) completed   Respiratory Treatment Status (SVN) PRN treatment not required   Ready to Wean/Extubation Screen   FIO2<=50 (chart decimal) 0.21   Preset CPAP/BiPAP Settings   Mode Of Delivery BiPAP S/T   $ CPAP/BiPAP Daily Charge BiPAP/CPAP Daily   $ Is patient using? Yes   Size of Mask Medium/Large   Sized Appropriately? Yes   Equipment Type V60   Airway Device Type medium full face mask   Humidifier not applicable   Ipap 16   EPAP (cm H2O) 10   Pressure Support (cm H2O) 6   Set Rate (Breaths/Min) 18   ITime (sec) 395   Rise Time (sec) 3   Patient CPAP/BiPAP Settings   CPAP/BIPAP ID 12   FiO2 Auto Set yes   Timed Inspiration (Sec) 0.95   IPAP Rise Time (sec) 3   RR Total (Breaths/Min) 32   Tidal Volume (mL) 517   VE Minute Ventilation (L/min) 15.6 L/min   Peak Inspiratory Pressure (cm H2O) 16   TiTOT (%) 38   Total Leak (L/Min) 9   Patient Trigger - ST Mode Only (%) 100   CPAP/BiPAP Alarms   High Pressure (cm H2O) 25   Low Pressure (cm H2O) 10   Minute Ventilation (L/Min) 3   High RR (breaths/min) 50   Low RR (breaths/min) 10   Apnea (Sec) 20   Respiratory  Evaluation   $ Care Plan Tech Time 15 min   $ Eval/Re-eval Charges Re-evaluation

## 2023-03-15 NOTE — CARE UPDATE
03/15/23 0831   Patient Assessment/Suction   Level of Consciousness (AVPU) alert   All Lung Fields Breath Sounds diminished;clear   Skin Integrity   $ Wound Care Tech Time 15 min   Area Observed Bridge of nose   Skin Appearance without discoloration   PRE-TX-O2   Device (Oxygen Therapy) room air   SpO2 (!) 94 %   Pulse Oximetry Type Intermittent   $ Pulse Oximetry - Multiple Charge Pulse Oximetry - Multiple   Pulse 91   Resp 15   Aerosol Therapy   $ Aerosol Therapy Charges PRN treatment not required   Preset CPAP/BiPAP Settings   $ CPAP/BiPAP Daily Charge BiPAP/CPAP Daily   Education   $ Education Bronchodilator;15 min   Respiratory Evaluation   $ Care Plan Tech Time 15 min

## 2023-03-15 NOTE — PROGRESS NOTES
[facility]  Department of Cardiology  Progress Note      PATIENT NAME: Wenceslao Jeong  MRN: [id number]  TODAY'S DATE: 03/15/2023  ADMIT DATE: 3/7/2023                          CONSULT REQUESTED BY: Andrea Corrales MD    SUBJECTIVE     PRINCIPAL PROBLEM: Acute combined systolic and diastolic congestive heart failure      REASON FOR CONSULT:    Newly diagnosed systolic HF      INTERVAL HISTORY:  3/15/23  Awaiting insurance approval for life vest  Pt has no c/o. VSS; no ectopy on telemetry; NSR in 90s    3/14/23  S/P angiogram, normal coronaries found; Await life vest; Pt denies any recent CP, SOB, fluid retention  Sinus tachycardia 114 bpm      3/13/23  Pt resting in bed; denies recent shortness of breath; No MEJIA; no swelling in abdomen or legs; creatinine 1.1 today;     3/12/23:  Pt resting in chair, ambulatory in room; denies dyspnea, chest pain, fluid retention; Breathing comfortably on room air; NSR on tele w/o acute events overnight; Creatinine 1.1    3/11/23:  Pt OOB in chair; denies dyspnea or cough; edema improved;   Creatinine improved today, 1.2; VSS, NSR on telemetry w/o any acute events overnight    3/10/23  Patient is resting comfortably during examination.  No acute distress.  Alert and oriented.  Vital signs stable.  Patient was supposed to have angiogram this morning.  However, creatinine increased to 1.5 overnight.  At this time we will postpone angiogram to Monday morning in view of LISA.      HPI:     Patient is a 27-year-old male with past medical history of hypertension who presented to the ED with acute onset of chest pain and shortness of breath with exertion, alleviated with rest for the past 3 weeks, worsening over the past 2-3 days.  Patient also reports a cough with pink foamy sputum.  Patient was recently started on losartan 50 mg daily on February 1, 2023 by his PCP.  Patient denies fever, chills, diaphoresis, lightheadedness, dizziness, palpitations, edema or bleeding.  Chest  x-ray shows right middle and lower lung pneumonia.  CT of the chest also shows multifocal pneumonia.  Blood pressure significantly elevated in ED and patient was started on a Tridil drip.  , echocardiogram performed with EF of 25% and grade 3 diastolic dysfunction, moderate MR/TR.  Patient was also found to be hypoxic and placed on 5 L and subsequently placed on BiPAP for worsening dyspnea.    During examination, patient is alert and oriented, on room air, no acute distress, sinus rhythm on telemetry.  Patient states he is feeling much better and denies active chest pain or shortness of breath.      FROM H&P:  HPI: Wenceslao Jeong is a 27 y.o. male with a history as  has no past medical history on file. who presented to the ED with a Chest Pain (Acute onset of chest pain during exertion. Allevaited with rest. Pt reports hx of MEJIA. Hx of HTN. )     Patient presents to the ED with a c/o SOB for about 3 weeks worsening on last 2-3 days and now with cough with pink foamy sputum. Patient reports he was seen by his PCP because he had been SOB and was told he did not have pneumonia, however was started on BP medications (losartan 50 mg daily) February 1, 2023.  Patient further states he was supposed to follow up with PCP to see if the losartan was working but he missed the appointment because his job was short and he had to fill in.      Denies fever, chills, diaphoresis, dizziness, HA, chest pain, palpitations, NVD, recent trauma or any other associated symptoms. No aggravating and alleviating factor.  Does not smoke cigarettes, drink or do illegal drugs.      Lab and imaging obtained and reviewed. CBC shows H/H 12.6/37.1 MCV 77 MCH 26.3 RDW 14.9. CMP shows Na+ 133 AG 7 glucose 112 Ca+ 8.4 T. Bili 1.7. . Initial Troponin 25.9. EKG shows sinus tachycardia; with possible left atrial enlargement. CXR shows right mid and lower lung pneumonia. On admit, temp 99.3 Hr 114 RR 16 /110 sats 95% on RA          "  Per ED provider, patient presented with SOB and productive cough "pink frothy sputum". CXR was completed and showed right mid/lower PNA. Started on IV ABX. However, BNP was elevated and given symptom, a bedside Echo was completed which per ED provider more consistent with HF with EF approx 50%. BP significantly elevated tridil gtt initiated, with good BP controlled, tridil gtt discontinued. Patient was also noted to have a decrease in sats to 91% on 5 liters O2, placed on Bipap with O2 sats improving, now currently on 2L o2 per NC with O2 sats 96% on assessment.         Review of patient's allergies indicates:  No Known Allergies    No past medical history on file.  Past Surgical History:   Procedure Laterality Date    LEFT HEART CATHETERIZATION Left 3/14/2023    Procedure: Left heart cath;  Surgeon: Joey Sousa MD;  Location: [facility] CATH/EP LAB;  Service: Cardiology;  Laterality: Left;           REVIEW OF SYSTEMS  GEN: no c/o  RESP: no coughing, dyspnea or MEJIA;  CV: No CP or fluid retention today  EXT: right groin cath site, no pain  OBJECTIVE     VITAL SIGNS (Most Recent)  Temp: 98 °F (36.7 °C) (03/15/23 0715)  Pulse: 91 (03/15/23 0831)  Resp: 15 (03/15/23 0831)  BP: 122/67 (03/15/23 0715)  SpO2: (!) 94 % (03/15/23 0831)    VENTILATION STATUS  Resp: 15 (03/15/23 0831)  SpO2: (!) 94 % (03/15/23 0831)  Oxygen Concentration (%):  [21] 21    I & O (Last 24H):No intake or output data in the 24 hours ending 03/15/23 0950      WEIGHTS  Wt Readings from Last 3 Encounters:   03/15/23 0345 131.8 kg (290 lb 9.1 oz)   03/14/23 0333 131.9 kg (290 lb 12.6 oz)   03/11/23 0332 131.5 kg (289 lb 14.5 oz)   03/10/23 0555 129.9 kg (286 lb 6 oz)   03/09/23 0417 130.6 kg (287 lb 14.7 oz)   03/07/23 1458 (!) 138.3 kg (305 lb)   03/08/23 1353 (!) 138.3 kg (305 lb)       PHYSICAL EXAM  GENERAL: well built, obese male, breathing comfortably in no apparent distress alert and oriented.   HEENT: Normocephalic. Pupils normal and " conjunctivae normal.    NECK: No JVD. No bruit..   THYROID: Thyroid not examined  CARDIAC: regular rate and rhythm. S1 is normal.S2 is normal. No gallops, clicks or murmurs noted at this time.  CHEST ANATOMY: normal.   LUNGS: Clear to auscultation. No wheezing or rhonchi..   ABDOMEN: Soft. Normal bowel sounds.  Nontender  URINARY: No serna catheter   EXTREMITIES: No cyanosis, clubbing or edema noted at this time.  CENTRAL NERVOUS SYSTEM: No focal motor or sensory deficits noted.   SKIN: Skin without lesions, moist, well perfused.   MUSCLE STRENGTH & TONE: No noteable weakness, atrophy or abnormal movement.   TELE: NSR, no ectopy  HOME MEDICATIONS:  No current facility-administered medications on file prior to encounter.     Current Outpatient Medications on File Prior to Encounter   Medication Sig Dispense Refill    albuterol (PROVENTIL/VENTOLIN HFA) 90 mcg/actuation inhaler Inhale 2 puffs into the lungs every 4 (four) hours as needed.      losartan (COZAAR) 50 MG tablet Take 50 mg by mouth once daily.         SCHEDULED MEDS:   aspirin  324 mg Oral Daily    atorvastatin  20 mg Oral QHS    calcium carbonate  500 mg Oral TID WM    enoxaparin  40 mg Subcutaneous Q12H    famotidine  20 mg Oral BID    isosorbide mononitrate  30 mg Oral Daily    metoprolol tartrate  25 mg Oral BID    sacubitriL-valsartan  1 tablet Oral BID    spironolactone  25 mg Oral Daily       CONTINUOUS INFUSIONS:          PRN MEDS:acetaminophen, benzonatate, hydrALAZINE, ibuprofen, levalbuterol, magnesium oxide, magnesium oxide, melatonin, naloxone, nitroGLYCERIN, ondansetron, potassium bicarbonate, potassium bicarbonate, potassium bicarbonate, potassium, sodium phosphates, potassium, sodium phosphates, potassium, sodium phosphates, sodium chloride 0.9%, sodium chloride 0.9%    LABS AND DIAGNOSTICS     CBC LAST 3 DAYS  Recent Labs   Lab 03/13/23  0452 03/14/23  0429 03/15/23  0441   WBC 8.30 8.73 9.32   RBC 4.76 4.97 4.98   HGB 12.7* 13.0* 13.0*    HCT 36.9* 38.1* 38.9*   MCV 78* 77* 78*   MCH 26.7* 26.2* 26.1*   MCHC 34.4 34.1 33.4   RDW 14.9* 15.2* 15.3*    358 337   MPV 10.8 10.9 10.8   GRAN 52.3  4.3 56.3  4.9 56.6  5.3   LYMPH 35.3  2.9 31.8  2.8 31.3  2.9   MONO 9.3  0.8 9.3  0.8 10.2  1.0   BASO 0.09 0.07 0.05   NRBC 0 0 0         COAGULATION LAST 3 DAYS  Recent Labs   Lab 03/13/23  1020   INR 1.1   APTT 31.0         CHEMISTRY LAST 3 DAYS  Recent Labs   Lab 03/12/23  0439 03/13/23  0452 03/14/23  0429 03/15/23  0441    137 137 136   K 4.5 4.8 4.3 4.3   CL 99 101 106 104   CO2 27 25 25 24   ANIONGAP 10 11 6* 8   BUN 19 15 14 14   CREATININE 1.1 1.1 1.1 1.2   * 114* 111* 108   CALCIUM 9.0 8.7 8.7 8.6*   MG 2.2 2.1 2.0 2.1   ALBUMIN 3.6 3.5 3.6  --    PROT 7.1 7.1 7.0  --    ALKPHOS 52* 51* 54*  --    ALT 26 38 41  --    AST 21 34 29  --    BILITOT 0.5 0.5 0.5  --          CARDIAC PROFILE LAST 3 DAYS  Recent Labs   Lab 03/10/23  0427   *         ENDOCRINE LAST 3 DAYS  No results for input(s): TSH, PROCAL in the last 168 hours.      LAST ARTERIAL BLOOD GAS  ABG  No results for input(s): PH, PO2, PCO2, HCO3, BE in the last 168 hours.      LAST 7 DAYS MICROBIOLOGY   Microbiology Results (last 7 days)       ** No results found for the last 168 hours. **            MOST RECENT IMAGING  Cardiac catheterization    The estimated blood loss was none.    The coronary arteries were normal..      ECHOCARDIOGRAM RESULTS (last 5)  Results for orders placed during the hospital encounter of 03/07/23    Echo    Interpretation Summary  · The left ventricle is normal in size with mild eccentric hypertrophy and severely decreased systolic function.  · The estimated ejection fraction is 25%.  · Grade III left ventricular diastolic dysfunction.  · Normal right ventricular size with normal right ventricular systolic function.  · Mild right atrial enlargement.  · Moderate mitral regurgitation.  · Mild to moderate tricuspid regurgitation.  ·  Mild pulmonic regurgitation.  · Moderate left atrial enlargement.      CURRENT/PREVIOUS VISIT EKG  Results for orders placed or performed during the hospital encounter of 03/07/23   EKG 12-lead    Collection Time: 03/13/23  9:47 AM    Narrative    Test Reason : I50.9,I42.9,    Vent. Rate : 107 BPM     Atrial Rate : 107 BPM     P-R Int : 166 ms          QRS Dur : 100 ms      QT Int : 368 ms       P-R-T Axes : 046 012 059 degrees     QTc Int : 491 ms    Sinus tachycardia  Possible Left atrial enlargement  Borderline Abnormal ECG  When compared with ECG of 08-MAR-2023 07:46,  Nonspecific T wave abnormality no longer evident in Inferior leads  Confirmed by Mani Marcus MD ([id number]) on 3/13/2023 4:54:59 PM    Referred By: LY   SELF           Confirmed By:Mani Marcus MD           ASSESSMENT/PLAN:     Active Hospital Problems    Diagnosis    *Acute combined systolic and diastolic congestive heart failure    Severe obesity (BMI >= 40)    Microcytic anemia    HTN (hypertension)       ASSESSMENT & PLAN:   Cardiomyopathy  New onset combined systolic and diastolic heart failure  Acute pulmonary edema  Hypertensive urgency  Acute respiratory failure with hypoxia  Right middle/lower lobe pneumonia  Elevated troponin    RECOMMENDATIONS:    Patient presented with hypertensive emergency and new onset combined systolic and diastolic heart failure- EF of 25% with grade 3 diastolic dysfunction as well as moderate MR/TR.      Patient had downtrend in BNP and weight loss of 16 lbs since 3/8/23. Appears euvolemic. Continue daily weights, strict I&Os, 1.5 L fluid restriction.  Low-sodium heart healthy diet.    Continue metoprolol succinate 25 mg b.i.d., isosorbide mononitrate 30 mg daily, spironolactone 25 mg daily and Entresto 24-26 mg BID.  May increase Entresto dosage prior to discharge or outpatient. Hold antihypertensives for systolic BP less than 100.    Angiogram revealed no occlusive coronary artery disease    5. Await life  vest before discharge home. Pt will need to wear life vest x 3 months then return to office for follow up with echocardiogram        Serena Bird NP  [facility]  Department of Cardiology  Date of Service: 03/15/2023      Patient remains stable hemodynamically.  No further arrhythmias noted he is tolerating optimize management for acute decompensation of congestive heart failure.  LifeVest is being arranged.  And he is stable for discharge.    I have personally interviewed and examined the patient, I have reviewed the Nurse Practitioner's history and physical, assessment, and plan. I have personally evaluated the patient at bedside and agree with the findings and made appropriate changes as necessary in recommendations.    Dr. DEIRDRE Marcus MD  Department of Cardiology  [facility]  3/15/23

## 2023-03-15 NOTE — ASSESSMENT & PLAN NOTE
Due to CHF.  Will stop IV diuretics in light of the bump in creatinine level.  Monitor volume status.     respirations non-labored/clear to auscultation bilaterally/no intercostal retractions/no rales/airway patent/good air movement/no chest wall tenderness/no rhonchi/breath sounds equal/no wheezes

## 2023-03-16 ENCOUNTER — TELEPHONE (OUTPATIENT)
Dept: CARDIOLOGY | Facility: CLINIC | Age: 28
End: 2023-03-16
Payer: COMMERCIAL

## 2023-03-16 ENCOUNTER — TELEPHONE (OUTPATIENT)
Dept: FAMILY MEDICINE | Facility: CLINIC | Age: 28
End: 2023-03-16

## 2023-03-16 VITALS
RESPIRATION RATE: 18 BRPM | HEART RATE: 85 BPM | WEIGHT: 290.56 LBS | SYSTOLIC BLOOD PRESSURE: 130 MMHG | OXYGEN SATURATION: 96 % | TEMPERATURE: 98 F | HEIGHT: 68 IN | DIASTOLIC BLOOD PRESSURE: 69 MMHG | BODY MASS INDEX: 44.04 KG/M2

## 2023-03-16 PROBLEM — I50.40 COMBINED SYSTOLIC AND DIASTOLIC CONGESTIVE HEART FAILURE: Status: ACTIVE | Noted: 2023-03-09

## 2023-03-16 LAB
ANION GAP SERPL CALC-SCNC: 8 MMOL/L (ref 8–16)
BASOPHILS # BLD AUTO: 0.06 K/UL (ref 0–0.2)
BASOPHILS NFR BLD: 0.8 % (ref 0–1.9)
BUN SERPL-MCNC: 15 MG/DL (ref 6–20)
CALCIUM SERPL-MCNC: 8.6 MG/DL (ref 8.7–10.5)
CHLORIDE SERPL-SCNC: 103 MMOL/L (ref 95–110)
CO2 SERPL-SCNC: 24 MMOL/L (ref 23–29)
CREAT SERPL-MCNC: 1.2 MG/DL (ref 0.5–1.4)
DIFFERENTIAL METHOD: ABNORMAL
EOSINOPHIL # BLD AUTO: 0.2 K/UL (ref 0–0.5)
EOSINOPHIL NFR BLD: 2.6 % (ref 0–8)
ERYTHROCYTE [DISTWIDTH] IN BLOOD BY AUTOMATED COUNT: 15.5 % (ref 11.5–14.5)
EST. GFR  (NO RACE VARIABLE): >60 ML/MIN/1.73 M^2
GLUCOSE SERPL-MCNC: 108 MG/DL (ref 70–110)
HCT VFR BLD AUTO: 37.7 % (ref 40–54)
HGB BLD-MCNC: 12.7 G/DL (ref 14–18)
IMM GRANULOCYTES # BLD AUTO: 0.01 K/UL (ref 0–0.04)
IMM GRANULOCYTES NFR BLD AUTO: 0.1 % (ref 0–0.5)
LYMPHOCYTES # BLD AUTO: 3.5 K/UL (ref 1–4.8)
LYMPHOCYTES NFR BLD: 46.6 % (ref 18–48)
MAGNESIUM SERPL-MCNC: 2.1 MG/DL (ref 1.6–2.6)
MCH RBC QN AUTO: 26.5 PG (ref 27–31)
MCHC RBC AUTO-ENTMCNC: 33.7 G/DL (ref 32–36)
MCV RBC AUTO: 79 FL (ref 82–98)
MONOCYTES # BLD AUTO: 0.9 K/UL (ref 0.3–1)
MONOCYTES NFR BLD: 11.9 % (ref 4–15)
NEUTROPHILS # BLD AUTO: 2.9 K/UL (ref 1.8–7.7)
NEUTROPHILS NFR BLD: 38 % (ref 38–73)
NRBC BLD-RTO: 0 /100 WBC
PHOSPHATE SERPL-MCNC: 4.9 MG/DL (ref 2.7–4.5)
PLATELET # BLD AUTO: 305 K/UL (ref 150–450)
PMV BLD AUTO: 11.3 FL (ref 9.2–12.9)
POTASSIUM SERPL-SCNC: 4.2 MMOL/L (ref 3.5–5.1)
RBC # BLD AUTO: 4.8 M/UL (ref 4.6–6.2)
SODIUM SERPL-SCNC: 135 MMOL/L (ref 136–145)
WBC # BLD AUTO: 7.56 K/UL (ref 3.9–12.7)

## 2023-03-16 PROCEDURE — 25000003 PHARM REV CODE 250: Performed by: NURSE PRACTITIONER

## 2023-03-16 PROCEDURE — 85025 COMPLETE CBC W/AUTO DIFF WBC: CPT | Performed by: NURSE PRACTITIONER

## 2023-03-16 PROCEDURE — 36415 COLL VENOUS BLD VENIPUNCTURE: CPT | Performed by: NURSE PRACTITIONER

## 2023-03-16 PROCEDURE — 84100 ASSAY OF PHOSPHORUS: CPT | Performed by: NURSE PRACTITIONER

## 2023-03-16 PROCEDURE — 83735 ASSAY OF MAGNESIUM: CPT | Performed by: NURSE PRACTITIONER

## 2023-03-16 PROCEDURE — 80048 BASIC METABOLIC PNL TOTAL CA: CPT | Performed by: STUDENT IN AN ORGANIZED HEALTH CARE EDUCATION/TRAINING PROGRAM

## 2023-03-16 PROCEDURE — 63600175 PHARM REV CODE 636 W HCPCS: Performed by: FAMILY MEDICINE

## 2023-03-16 PROCEDURE — 25000003 PHARM REV CODE 250: Performed by: HOSPITALIST

## 2023-03-16 PROCEDURE — 25000003 PHARM REV CODE 250: Performed by: STUDENT IN AN ORGANIZED HEALTH CARE EDUCATION/TRAINING PROGRAM

## 2023-03-16 RX ORDER — METOPROLOL TARTRATE 25 MG/1
25 TABLET, FILM COATED ORAL 2 TIMES DAILY
Qty: 60 TABLET | Refills: 2 | Status: SHIPPED | OUTPATIENT
Start: 2023-03-16 | End: 2023-08-17 | Stop reason: SDUPTHER

## 2023-03-16 RX ORDER — ISOSORBIDE MONONITRATE 30 MG/1
30 TABLET, EXTENDED RELEASE ORAL DAILY
Qty: 30 TABLET | Refills: 2 | Status: SHIPPED | OUTPATIENT
Start: 2023-03-17 | End: 2023-06-22 | Stop reason: SDUPTHER

## 2023-03-16 RX ORDER — SPIRONOLACTONE 25 MG/1
25 TABLET ORAL DAILY
Qty: 30 TABLET | Refills: 2 | Status: SHIPPED | OUTPATIENT
Start: 2023-03-17 | End: 2023-06-22 | Stop reason: SDUPTHER

## 2023-03-16 RX ADMIN — ASPIRIN 81 MG CHEWABLE TABLET 324 MG: 81 TABLET CHEWABLE at 08:03

## 2023-03-16 RX ADMIN — FAMOTIDINE 20 MG: 20 TABLET ORAL at 08:03

## 2023-03-16 RX ADMIN — SPIRONOLACTONE 25 MG: 25 TABLET, FILM COATED ORAL at 08:03

## 2023-03-16 RX ADMIN — METOPROLOL TARTRATE 25 MG: 25 TABLET, FILM COATED ORAL at 08:03

## 2023-03-16 RX ADMIN — ISOSORBIDE MONONITRATE 30 MG: 30 TABLET, EXTENDED RELEASE ORAL at 08:03

## 2023-03-16 RX ADMIN — ENOXAPARIN SODIUM 40 MG: 100 INJECTION SUBCUTANEOUS at 08:03

## 2023-03-16 RX ADMIN — CALCIUM CARBONATE (ANTACID) CHEW TAB 500 MG 500 MG: 500 CHEW TAB at 11:03

## 2023-03-16 RX ADMIN — CALCIUM CARBONATE (ANTACID) CHEW TAB 500 MG 500 MG: 500 CHEW TAB at 08:03

## 2023-03-16 RX ADMIN — SACUBITRIL AND VALSARTAN 1 TABLET: 24; 26 TABLET, FILM COATED ORAL at 08:03

## 2023-03-16 NOTE — PLAN OF CARE
Problem: Adult Inpatient Plan of Care  Goal: Plan of Care Review  Outcome: Ongoing, Progressing  Goal: Patient-Specific Goal (Individualized)  Outcome: Ongoing, Progressing  Goal: Absence of Hospital-Acquired Illness or Injury  Outcome: Ongoing, Progressing     Problem: Bariatric Environmental Safety  Goal: Safety Maintained with Care  Outcome: Ongoing, Progressing     Problem: Fluid Imbalance (Pneumonia)  Goal: Fluid Balance  Outcome: Ongoing, Progressing     Problem: Infection (Pneumonia)  Goal: Resolution of Infection Signs and Symptoms  Outcome: Ongoing, Progressing

## 2023-03-16 NOTE — DISCHARGE SUMMARY
"[facility] Medicine  Discharge Summary      Patient Name: Wenceslao Jeong  MRN: [id number]  CODY: [id number]  Patient Class: IP- Inpatient  Admission Date: 3/7/2023  Hospital Length of Stay: 9 days  Discharge Date and Time: No discharge date for patient encounter.  Attending Physician: Andrea Corrales MD   Discharging Provider: Andrea Corrales MD  Primary Care Provider: BELKYS Wheeler    Primary Care Team: Networked reference to record PCT     HPI:   Wenceslao Jeong is a 27 y.o. male with a history as  has no past medical history on file. who presented to the ED with a Chest Pain (Acute onset of chest pain during exertion. Allevaited with rest. Pt reports hx of MEJIA. Hx of HTN. )    Patient presents to the ED with a c/o SOB for about 3 weeks worsening on last 2-3 days and now with cough with pink foamy sputum. Patient reports he was seen by his PCP because he had been SOB and was told he did not have pneumonia, however was started on BP medications (losartan 50 mg daily) February 1, 2023.  Patient further states he was supposed to follow up with PCP to see if the losartan was working but he missed the appointment because his job was short and he had to fill in.     Denies fever, chills, diaphoresis, dizziness, HA, chest pain, palpitations, NVD, recent trauma or any other associated symptoms. No aggravating and alleviating factor.  Does not smoke cigarettes, drink or do illegal drugs.     Lab and imaging obtained and reviewed. CBC shows H/H 12.6/37.1 MCV 77 MCH 26.3 RDW 14.9. CMP shows Na+ 133 AG 7 glucose 112 Ca+ 8.4 T. Bili 1.7. . Initial Troponin 25.9. EKG shows sinus tachycardia; with possible left atrial enlargement. CXR shows right mid and lower lung pneumonia. On admit, temp 99.3 Hr 114 RR 16 /110 sats 95% on RA         Per ED provider, patient presented with SOB and productive cough "pink frothy sputum". CXR was completed and showed right mid/lower PNA. Started on IV " ABX. However, BNP was elevated and given symptom, a bedside Echo was completed which per ED provider more consistent with HF with EF approx 50%. BP significantly elevated tridil gtt initiated, with good BP controlled, tridil gtt discontinued. Patient was also noted to have a decrease in sats to 91% on 5 liters O2, placed on Bipap with O2 sats improving, now currently on 2L o2 per NC with O2 sats 96% on assessment.                Procedure(s) (LRB):  Left heart cath (Left)      Hospital Course:   Wenceslao Jeong is a 27 year old male with a past medical history of obesity and HTN who presented with shortness of breath, chest pain, and productive cough secondary to a new onset combined congestive heart failure in the setting of hypertensive emergency with demand ischemia. His blood pressure was able to be controlled with a nitrate infusion transitioned to PO medications. He symptoms also improved with IV diuretics. Cardiology was consulted. TTE showed EF of 25% with grade III diastolic dysfunction as well as MR and TR. He is currently stable on room air and off diuretics. Entresto, Aldactone, isosorbide mononitrate and metoprolol have been started. He underwent coronary angiogram 3/14 which showed no evidence of coronary artery disease. He needs a Life Vest on discharge; however, his insurance policy/coverage has decided that they will not cover the cost of this device. A peer to peer meeting took place with continued denial of the device; an appeal is to be instituted. He needs to follow up with his PCP and Cardiology and have a repeat TTE in three months.       Goals of Care Treatment Preferences:  Code Status: Full Code      Consults:   Consults (From admission, onward)        Status Ordering Provider     Inpatient consult to Social Work/Case Management  Once        Provider:  (Not yet assigned)    Acknowledged BRITTANY ROCK     Inpatient consult to Registered Dietitian/Nutritionist  Once        Provider:   (Not yet assigned)    Completed ROSANGELA GUTHRIE     Inpatient consult to Cardiology  Once        Provider:  Mani Marcus MD    Completed AARTI ARZOLA          Cardiac/Vascular  * Combined systolic and diastolic congestive heart failure  TTE shows EF 25% and grade III diastolic dysfunction. Non ischemic based on angiogram 3/14. May be related to patient's uncontrolled HTN.  -Continue Entresto, metoprolol and Aldcatone  -Telemetry  -Cardiology consulted  -Strict I's and O's  -Keep K > 4 and Mg > 2  -Will need Life Vest on discharge; appeal of insurance companies decision to not cover device to be implemented as they have denied him this device        HTN (hypertension)  Controlled with nitrate, BB, Entresto and Aldactone.  -Patient may benefit from secondary HTN workup; consider renal artery ultrasound with Doppler as well as renin/angiotensin ratio off Aldactone  -Continue to monitor      Oncology  Microcytic anemia  Ferritin within normal limits  -Trend Hgb with CBC  -May need Hgb electrophoresis in the outpatient setting    Endocrine  Severe obesity (BMI >= 40)  Body mass index is 44.19 kg/m². Morbid obesity complicates all aspects of disease management from diagnostic modalities to treatment.          Final Active Diagnoses:    Diagnosis Date Noted POA    PRINCIPAL PROBLEM:  Combined systolic and diastolic congestive heart failure [I50.40] 03/09/2023 Yes    Severe obesity (BMI >= 40) [E66.01] 03/11/2023 Yes    Microcytic anemia [D50.9] 03/11/2023 Yes    HTN (hypertension) [I10] 03/07/2023 Yes      Problems Resolved During this Admission:    Diagnosis Date Noted Date Resolved POA    Hypertensive urgency [I16.0] 03/07/2023 03/09/2023 Yes    Acute pulmonary edema [J81.0] 03/07/2023 03/11/2023 Yes    Elevated troponin [R77.8] 03/07/2023 03/09/2023 Yes    Hyponatremia [E87.1] 03/07/2023 03/10/2023 Yes    Acute respiratory failure with hypoxia [J96.01] 03/07/2023 03/09/2023 Yes       Discharged  Condition: stable    Disposition: Home or Self Care    Follow Up:   Follow-up Information     William Brito MD. Go in 1 week(s).    Specialty: Family Medicine  Why: Office notified of need of sooner appointment than April. Office to contact you with appointment date/time.  Contact information:  [address] [facility]  [address]  [facility] [address]  [phone]             Mason Marcus MD Follow up.    Specialties: Interventional Cardiology, Cardiology  Contact information:  [address] [facility]  [address]  [facility]  [facility] [address]  [phone]                       Patient Instructions:      Diet Cardiac     Notify your health care provider if you experience any of the following:  increased confusion or weakness     Notify your health care provider if you experience any of the following:  persistent dizziness, light-headedness, or visual disturbances     Notify your health care provider if you experience any of the following:  severe persistent headache     Notify your health care provider if you experience any of the following:  difficulty breathing or increased cough     Notify your health care provider if you experience any of the following:  severe uncontrolled pain     Notify your health care provider if you experience any of the following:  persistent nausea and vomiting or diarrhea     Notify your health care provider if you experience any of the following:  temperature >100.4     Activity as tolerated       Significant Diagnostic Studies: Labs: All labs within the past 24 hours have been reviewed    Pending Diagnostic Studies:     None         Medications:  Reconciled Home Medications:      Medication List      START taking these medications    isosorbide mononitrate 30 MG 24 hr tablet  Commonly known as: IMDUR  Take 1 tablet (30 mg total) by mouth once daily.  Start taking on: March 17, 2023     metoprolol tartrate 25 MG tablet  Commonly known as: LOPRESSOR  Take 1 tablet (25 mg total) by mouth 2  (two) times daily.     sacubitriL-valsartan 24-26 mg per tablet  Commonly known as: ENTRESTO  Take 1 tablet by mouth 2 (two) times daily.     spironolactone 25 MG tablet  Commonly known as: ALDACTONE  Take 1 tablet (25 mg total) by mouth once daily.  Start taking on: March 17, 2023        CONTINUE taking these medications    albuterol 90 mcg/actuation inhaler  Commonly known as: PROVENTIL/VENTOLIN HFA  Inhale 2 puffs into the lungs every 4 (four) hours as needed.        STOP taking these medications    losartan 50 MG tablet  Commonly known as: COZAAR            Indwelling Lines/Drains at time of discharge:   Lines/Drains/Airways     None                 Time spent on the discharge of patient: 36 minutes         Andrea Corrales MD  Department of Hospital Medicine  Replaced by [facility]

## 2023-03-16 NOTE — TELEPHONE ENCOUNTER
----- Message from Elicia German MA sent at 3/16/2023 11:31 AM CDT -----    ----- Message -----  From: Beti Rose  Sent: 3/16/2023  10:51 AM CDT  To: [facility] Staff    Rocio from Case Management stated that the patient need to schedule a Hospital follow up ASAP he is being discharged today she stated he is pretty sick please call the patient at [phone]

## 2023-03-16 NOTE — NURSING
PIV x 2 removed tolerated well by patient. Medications delivered to bedside. AVS provided and discussed. Patient discharged in stable condition home with relative.

## 2023-03-16 NOTE — ASSESSMENT & PLAN NOTE
Controlled with nitrate, BB, Entresto and Aldactone.  -Patient may benefit from secondary HTN workup; consider renal artery ultrasound with Doppler as well as renin/angiotensin ratio off Aldactone  -Continue to monitor

## 2023-03-16 NOTE — PLAN OF CARE
03/16/23 0849   Discharge Reassessment   Assessment Type Discharge Planning Reassessment   Did the patient's condition or plan change since previous assessment? Yes   Discharge Plan discussed with: Patient   Communicated CAROLINE with patient/caregiver Yes   Discharge Plan A Home with family   Discharge Plan B Home   DME Needed Upon Discharge  other (see comments)  (lifevest)   Why the patient remains in the hospital Insurance issues   Post-Acute Status   Post-Acute Authorization HME   Discharge Delays (!) Home Medical Equipment (Insurance, Delivery)     Case management noted continued denial for lifevest, despite peer to peer. Case management notified meir Skelton rep, of above.

## 2023-03-16 NOTE — PLAN OF CARE
03/16/23 1441   Final Note   Assessment Type Final Discharge Note   Anticipated Discharge Disposition Home   What phone number can be called within the next 1-3 days to see how you are doing after discharge? [phone]   Hospital Resources/Appts/Education Provided Provided patient/caregiver with written discharge plan information;Appointments scheduled and added to AVS   Post-Acute Status   Discharge Delays None known at this time     Discharge orders and chart reviewed. No other discharge needs noted at this time. Pt is clear for discharge from case management. Pt is discharging to home.    No appointments available for PCP, message sent by  to Dr. rBito's staff. Office to contact patient with appointment information.     InBaphani message sent to Dr. Marcus's office requesting hospital follow up appt. Office to contact patient with appt date/time.

## 2023-03-16 NOTE — PLAN OF CARE
Problem: Adult Inpatient Plan of Care  Goal: Plan of Care Review  3/16/2023 1359 by Rani Delgado LPN  Outcome: Met  3/16/2023 1359 by Rani Delgado LPN  Outcome: Ongoing, Progressing  Goal: Patient-Specific Goal (Individualized)  3/16/2023 1359 by Rani Delgado LPN  Outcome: Met  3/16/2023 1359 by Rani Delgado LPN  Outcome: Ongoing, Progressing  Goal: Absence of Hospital-Acquired Illness or Injury  3/16/2023 1359 by Rani Delgado LPN  Outcome: Met  3/16/2023 1359 by Rani Delgado LPN  Outcome: Ongoing, Progressing  Goal: Optimal Comfort and Wellbeing  Outcome: Met  Goal: Readiness for Transition of Care  Outcome: Met     Problem: Bariatric Environmental Safety  Goal: Safety Maintained with Care  3/16/2023 1359 by Rani Delgado LPN  Outcome: Met  3/16/2023 1359 by Rani Delgado LPN  Outcome: Ongoing, Progressing     Problem: Fluid Imbalance (Pneumonia)  Goal: Fluid Balance  3/16/2023 1359 by Rani Delgado LPN  Outcome: Met  3/16/2023 1359 by Rani Delgado LPN  Outcome: Ongoing, Progressing     Problem: Infection (Pneumonia)  Goal: Resolution of Infection Signs and Symptoms  3/16/2023 1359 by Rani Delgado LPN  Outcome: Met  3/16/2023 1359 by Rani Delgado LPN  Outcome: Ongoing, Progressing     Problem: Respiratory Compromise (Pneumonia)  Goal: Effective Oxygenation and Ventilation  Outcome: Met     Problem: Fall Injury Risk  Goal: Absence of Fall and Fall-Related Injury  Outcome: Met

## 2023-03-16 NOTE — CARE UPDATE
03/16/23 0817   Patient Assessment/Suction   Level of Consciousness (AVPU) alert   All Lung Fields Breath Sounds diminished   Skin Integrity   $ Wound Care Tech Time 15 min   Area Observed Bridge of nose   Skin Appearance without discoloration   PRE-TX-O2   Device (Oxygen Therapy) room air   SpO2 96 %   Pulse Oximetry Type Intermittent   $ Pulse Oximetry - Multiple Charge Pulse Oximetry - Multiple   Pulse 95   Resp 15   Aerosol Therapy   $ Aerosol Therapy Charges PRN treatment not required   Preset CPAP/BiPAP Settings   $ CPAP/BiPAP Daily Charge BiPAP/CPAP Daily   Education   $ Education Bronchodilator;15 min   Respiratory Evaluation   $ Care Plan Tech Time 15 min

## 2023-03-16 NOTE — ASSESSMENT & PLAN NOTE
TTE shows EF 25% and grade III diastolic dysfunction. Non ischemic based on angiogram 3/14. May be related to patient's uncontrolled HTN.  -Continue Entresto, metoprolol and Aldcatone  -Telemetry  -Cardiology consulted  -Strict I's and O's  -Keep K > 4 and Mg > 2  -Will need Life Vest on discharge; appeal of insurance companies decision to not cover device to be implemented as they have denied him this device

## 2023-03-16 NOTE — TELEPHONE ENCOUNTER
----- Message from Lydia Padron RN sent at 3/16/2023  2:36 PM CDT -----  Good afternoon    Patient admitted for long hospital stay at [facility] for acute/chronic CHF. Extensive workup with cardiology. He was supposed to discharge with a lifevest, per Dr. Marcus, but it was denied by insurance. Patient will need a hospital follow up appointment. Please schedule appointment and contact patient with appt information.     Thank you  Lydia Padron RN CM

## 2023-03-21 ENCOUNTER — OFFICE VISIT (OUTPATIENT)
Dept: CARDIOLOGY | Facility: CLINIC | Age: 28
End: 2023-03-21
Payer: COMMERCIAL

## 2023-03-21 VITALS
DIASTOLIC BLOOD PRESSURE: 84 MMHG | OXYGEN SATURATION: 99 % | HEIGHT: 68 IN | WEIGHT: 289 LBS | BODY MASS INDEX: 43.8 KG/M2 | RESPIRATION RATE: 16 BRPM | SYSTOLIC BLOOD PRESSURE: 136 MMHG | HEART RATE: 97 BPM

## 2023-03-21 DIAGNOSIS — I10 HYPERTENSION, UNSPECIFIED TYPE: ICD-10-CM

## 2023-03-21 DIAGNOSIS — E66.01 SEVERE OBESITY (BMI >= 40): ICD-10-CM

## 2023-03-21 DIAGNOSIS — I50.9 CONGESTIVE HEART FAILURE, UNSPECIFIED HF CHRONICITY, UNSPECIFIED HEART FAILURE TYPE: Primary | ICD-10-CM

## 2023-03-21 DIAGNOSIS — I50.42 CHRONIC COMBINED SYSTOLIC AND DIASTOLIC CONGESTIVE HEART FAILURE: ICD-10-CM

## 2023-03-21 PROCEDURE — 99999 PR PBB SHADOW E&M-EST. PATIENT-LVL IV: ICD-10-PCS | Mod: PBBFAC,,, | Performed by: NURSE PRACTITIONER

## 2023-03-21 PROCEDURE — 1159F PR MEDICATION LIST DOCUMENTED IN MEDICAL RECORD: ICD-10-PCS | Mod: CPTII,S$GLB,, | Performed by: NURSE PRACTITIONER

## 2023-03-21 PROCEDURE — 1111F DSCHRG MED/CURRENT MED MERGE: CPT | Mod: CPTII,S$GLB,, | Performed by: NURSE PRACTITIONER

## 2023-03-21 PROCEDURE — 3008F BODY MASS INDEX DOCD: CPT | Mod: CPTII,S$GLB,, | Performed by: NURSE PRACTITIONER

## 2023-03-21 PROCEDURE — 3008F PR BODY MASS INDEX (BMI) DOCUMENTED: ICD-10-PCS | Mod: CPTII,S$GLB,, | Performed by: NURSE PRACTITIONER

## 2023-03-21 PROCEDURE — 3079F DIAST BP 80-89 MM HG: CPT | Mod: CPTII,S$GLB,, | Performed by: NURSE PRACTITIONER

## 2023-03-21 PROCEDURE — 4010F ACE/ARB THERAPY RXD/TAKEN: CPT | Mod: CPTII,S$GLB,, | Performed by: NURSE PRACTITIONER

## 2023-03-21 PROCEDURE — 3075F PR MOST RECENT SYSTOLIC BLOOD PRESS GE 130-139MM HG: ICD-10-PCS | Mod: CPTII,S$GLB,, | Performed by: NURSE PRACTITIONER

## 2023-03-21 PROCEDURE — 99999 PR PBB SHADOW E&M-EST. PATIENT-LVL IV: CPT | Mod: PBBFAC,,, | Performed by: NURSE PRACTITIONER

## 2023-03-21 PROCEDURE — 99213 OFFICE O/P EST LOW 20 MIN: CPT | Mod: S$GLB,,, | Performed by: NURSE PRACTITIONER

## 2023-03-21 PROCEDURE — 1111F PR DISCHARGE MEDS RECONCILED W/ CURRENT OUTPATIENT MED LIST: ICD-10-PCS | Mod: CPTII,S$GLB,, | Performed by: NURSE PRACTITIONER

## 2023-03-21 PROCEDURE — 3075F SYST BP GE 130 - 139MM HG: CPT | Mod: CPTII,S$GLB,, | Performed by: NURSE PRACTITIONER

## 2023-03-21 PROCEDURE — 4010F PR ACE/ARB THEARPY RXD/TAKEN: ICD-10-PCS | Mod: CPTII,S$GLB,, | Performed by: NURSE PRACTITIONER

## 2023-03-21 PROCEDURE — 99213 PR OFFICE/OUTPT VISIT, EST, LEVL III, 20-29 MIN: ICD-10-PCS | Mod: S$GLB,,, | Performed by: NURSE PRACTITIONER

## 2023-03-21 PROCEDURE — 3044F HG A1C LEVEL LT 7.0%: CPT | Mod: CPTII,S$GLB,, | Performed by: NURSE PRACTITIONER

## 2023-03-21 PROCEDURE — 3079F PR MOST RECENT DIASTOLIC BLOOD PRESSURE 80-89 MM HG: ICD-10-PCS | Mod: CPTII,S$GLB,, | Performed by: NURSE PRACTITIONER

## 2023-03-21 PROCEDURE — 1159F MED LIST DOCD IN RCRD: CPT | Mod: CPTII,S$GLB,, | Performed by: NURSE PRACTITIONER

## 2023-03-21 PROCEDURE — 1160F PR REVIEW ALL MEDS BY PRESCRIBER/CLIN PHARMACIST DOCUMENTED: ICD-10-PCS | Mod: CPTII,S$GLB,, | Performed by: NURSE PRACTITIONER

## 2023-03-21 PROCEDURE — 3044F PR MOST RECENT HEMOGLOBIN A1C LEVEL <7.0%: ICD-10-PCS | Mod: CPTII,S$GLB,, | Performed by: NURSE PRACTITIONER

## 2023-03-21 PROCEDURE — 1160F RVW MEDS BY RX/DR IN RCRD: CPT | Mod: CPTII,S$GLB,, | Performed by: NURSE PRACTITIONER

## 2023-03-21 NOTE — ASSESSMENT & PLAN NOTE
Patient's blood pressure in the office today is 136/84.  Patient is monitoring his blood pressure morning and night at home and gets similar readings as it office today.  I have increased his Entresto from 24-26 mg p.o. b.i.d. to 49-51 mg p.o. b.i.d..  Continue Imdur 30 mg p.o. daily, metoprolol 25 mg p.o. b.i.d. and spironolactone 25 mg daily.  Discussed avoidance of high sodium foods.

## 2023-03-21 NOTE — LETTER
March 21, 2023      [facility]  [address], [address]  [place] [address]  Phone: [phone]  Fax: [phone]       Patient: Wenceslao Jeong  YOB: 1995  Date of Visit: 03/21/2023    To Whom It May Concern:    Wenceslao Jeong was at [facility] on 03/21/2023. He may return to work/school on 3/17/23 with restrictions. He should lift no more than 80 lbs unassisted. If you have any questions or concerns, or if I can be of further assistance, please do not hesitate to contact my office.    Sincerely,    Serena Bird, BURAK Bird, ANNEP-C

## 2023-03-21 NOTE — ASSESSMENT & PLAN NOTE
Patient is identified as having combined systolic and diastolic heart failure that is chronic.  CHF is currently controlled Latest ECHO performed and demonstrates- Results for orders placed during the hospital encounter of 03/07/23    Echo    Interpretation Summary  · The left ventricle is normal in size with mild eccentric hypertrophy and severely decreased systolic function.  · The estimated ejection fraction is 25%.  · Grade III left ventricular diastolic dysfunction.  · Normal right ventricular size with normal right ventricular systolic function.  · Mild right atrial enlargement.  · Moderate mitral regurgitation.  · Mild to moderate tricuspid regurgitation.  · Mild pulmonic regurgitation.  · Moderate left atrial enlargement.  . Continue Entresto, increased to 49-51 mg p.o. b.i.d., metoprolol 25 mg p.o. b.i.d., Imdur 30 mg p.o. daily, spironolactone 25 mg p.o. daily, and monitor clinical status closely. Patient is on the CHF pathway.  Monitor strict Is&Os and daily weights.  Place on fluid restriction of 1.5 L. Continue to stress to patient importance of self efficacy and  on diet for CHF. Last BNP reviewed- and noted below @LABRCNTIP(BNP,BNPTRIAGEBLO)@.   Latest Reference Range & Units Most Recent   BNP 0 - 99 pg/mL 135 (H)  3/10/23 04:27   (H): Data is abnormally high    Repeat echo ordered for early June and patient has appointment to see Dr. Marcus in mid June.  Patient insurance company refused payment for LifeVest despite multiple attempts for approval including appeal.

## 2023-03-21 NOTE — PROGRESS NOTES
Subjective:    Patient ID:  Wenceslao Jeong is a 27 y.o. male patient here for evaluation Hospital Follow Up      History of Present Illness:  Patient is in the office today for hospital follow up.  Patient states he is feeling well, he has had no recurrent cough, shortness of breath, fluid retention, or chest pain.  He reports compliance with his medications and states he is taking his blood pressure 2 times daily prior to medications.  He states blood pressure has been well controlled at home.  He states he was trying to watch his sodium but is very hard.  Patient is back to work but is trying to avoid heavy lifting.  Patient's family history is significant for mother who is  at 50 years old due to CHF kidney failure and father is  at 37 years old due to some cancer.    2023-2023 at [facility] Course:   Wenceslao Jeong is a 27 year old male with a past medical history of obesity and HTN who presented with shortness of breath, chest pain, and productive cough secondary to a new onset combined congestive heart failure in the setting of hypertensive emergency with demand ischemia. His blood pressure was able to be controlled with a nitrate infusion transitioned to PO medications. He symptoms also improved with IV diuretics. Cardiology was consulted. TTE showed EF of 25% with grade III diastolic dysfunction as well as MR and TR. He is currently stable on room air and off diuretics. Entresto, Aldactone, isosorbide mononitrate and metoprolol have been started. He underwent coronary angiogram 3/14 which showed no evidence of coronary artery disease. He needs a Life Vest on discharge; however, his insurance policy/coverage has decided that they will not cover the cost of this device. A peer to peer meeting took place with continued denial of the device; an appeal is to be instituted. He needs to follow up with his PCP and Cardiology and have a repeat TTE in three months.      Review of  patient's allergies indicates:  No Known Allergies    History reviewed. No pertinent past medical history.  Past Surgical History:   Procedure Laterality Date    LEFT HEART CATHETERIZATION Left 3/14/2023    Procedure: Left heart cath;  Surgeon: Joey Sousa MD;  Location: [facility] CATH/EP LAB;  Service: Cardiology;  Laterality: Left;           REVIEW OF SYSTEMS: As noted in HPI   CARDIOVASCULAR: No recent chest pain, palpitations, arm, neck, or jaw pain  RESPIRATORY: No recent fever, cough chills, SOB or congestion  : No blood in the urine  GI: No Nausea, vomiting, constipation, diarrhea, blood, or reflux.  MUSCULOSKELETAL: No myalgias  NEURO: No lightheadedness or dizziness  EYES: No Double vision, blurry, vision or headache        Objective        Vitals:    03/21/23 1329   BP: 136/84   Pulse: 97   Resp: 16       LIPIDS - LAST 2   Lab Results   Component Value Date    CHOL 179 03/08/2023    HDL 55 03/08/2023    LDLCALC 105.2 03/08/2023    TRIG 94 03/08/2023    CHOLHDL 30.7 03/08/2023       CBC - LAST 2  Lab Results   Component Value Date    WBC 7.56 03/16/2023    WBC 9.32 03/15/2023    RBC 4.80 03/16/2023    RBC 4.98 03/15/2023    HGB 12.7 (L) 03/16/2023    HGB 13.0 (L) 03/15/2023    HCT 37.7 (L) 03/16/2023    HCT 38.9 (L) 03/15/2023    MCV 79 (L) 03/16/2023    MCV 78 (L) 03/15/2023    MCH 26.5 (L) 03/16/2023    MCH 26.1 (L) 03/15/2023    MCHC 33.7 03/16/2023    MCHC 33.4 03/15/2023    RDW 15.5 (H) 03/16/2023    RDW 15.3 (H) 03/15/2023     03/16/2023     03/15/2023    MPV 11.3 03/16/2023    MPV 10.8 03/15/2023    GRAN 2.9 03/16/2023    GRAN 38.0 03/16/2023    LYMPH 3.5 03/16/2023    LYMPH 46.6 03/16/2023    MONO 0.9 03/16/2023    MONO 11.9 03/16/2023    BASO 0.06 03/16/2023    BASO 0.05 03/15/2023    NRBC 0 03/16/2023    NRBC 0 03/15/2023       CHEMISTRY & LIVER FUNCTION - LAST 2  Lab Results   Component Value Date     (L) 03/16/2023     03/15/2023    K 4.2 03/16/2023    K 4.3  03/15/2023     03/16/2023     03/15/2023    CO2 24 03/16/2023    CO2 24 03/15/2023    ANIONGAP 8 03/16/2023    ANIONGAP 8 03/15/2023    BUN 15 03/16/2023    BUN 14 03/15/2023    CREATININE 1.2 03/16/2023    CREATININE 1.2 03/15/2023     03/16/2023     03/15/2023    CALCIUM 8.6 (L) 03/16/2023    CALCIUM 8.6 (L) 03/15/2023    PH 7.393 03/07/2023    MG 2.1 03/16/2023    MG 2.1 03/15/2023    ALBUMIN 3.6 03/14/2023    ALBUMIN 3.5 03/13/2023    PROT 7.0 03/14/2023    PROT 7.1 03/13/2023    ALKPHOS 54 (L) 03/14/2023    ALKPHOS 51 (L) 03/13/2023    ALT 41 03/14/2023    ALT 38 03/13/2023    AST 29 03/14/2023    AST 34 03/13/2023    BILITOT 0.5 03/14/2023    BILITOT 0.5 03/13/2023        CARDIAC PROFILE - LAST 2  Lab Results   Component Value Date     (H) 03/10/2023     (H) 03/07/2023     (H) 03/07/2023        COAGULATION - LAST 2  Lab Results   Component Value Date    INR 1.1 03/13/2023    APTT 31.0 03/13/2023       ENDOCRINE & PSA - LAST 2  Lab Results   Component Value Date    HGBA1C 5.8 03/08/2023    TSH 1.300 03/08/2023    PROCAL 0.05 03/07/2023        ECHOCARDIOGRAM RESULTS  Results for orders placed during the hospital encounter of 03/07/23    Echo    Interpretation Summary  · The left ventricle is normal in size with mild eccentric hypertrophy and severely decreased systolic function.  · The estimated ejection fraction is 25%.  · Grade III left ventricular diastolic dysfunction.  · Normal right ventricular size with normal right ventricular systolic function.  · Mild right atrial enlargement.  · Moderate mitral regurgitation.  · Mild to moderate tricuspid regurgitation.  · Mild pulmonic regurgitation.  · Moderate left atrial enlargement.      CURRENT/PREVIOUS VISIT EKG  Results for orders placed or performed during the hospital encounter of 03/07/23   EKG 12-lead    Collection Time: 03/13/23  9:47 AM    Narrative    Test Reason : I50.9,I42.9,    Vent. Rate : 107 BPM      Atrial Rate : 107 BPM     P-R Int : 166 ms          QRS Dur : 100 ms      QT Int : 368 ms       P-R-T Axes : 046 012 059 degrees     QTc Int : 491 ms    Sinus tachycardia  Possible Left atrial enlargement  Borderline Abnormal ECG  When compared with ECG of 08-MAR-2023 07:46,  Nonspecific T wave abnormality no longer evident in Inferior leads  Confirmed by Mani Marcus MD ([id number]) on 3/13/2023 4:54:59 PM    Referred By: LY   SELF           Confirmed By:Mani Marcus MD     No valid procedures specified.   No results found for this or any previous visit.    No valid procedures specified.    PHYSICAL EXAM  CONSTITUTIONAL: Well built, well nourished pleasant young male breathing comfortably in no apparent distress  NECK: no carotid bruit, no JVD  LUNGS: CTA, no cough, crackles, rhonchi or wheezing  CHEST WALL: no tenderness  HEART: regular rate and rhythm, S1, S2 normal, no murmur, click, rub or gallop   ABDOMEN: soft, non-tender, obese; bowel sounds normal; no masses,  no organomegaly  EXTREMITIES: Extremities normal, no edema, no calf tenderness noted  NEURO: AAO X 3, memory and speech are clear    I HAVE REVIEWED :    The vital signs, nurses notes, and all the pertinent radiology and labs.    Current Outpatient Medications   Medication Instructions    albuterol (PROVENTIL/VENTOLIN HFA) 90 mcg/actuation inhaler 2 puffs, Inhalation, Every 4 hours PRN    isosorbide mononitrate (IMDUR) 30 mg, Oral, Daily    metoprolol tartrate (LOPRESSOR) 25 mg, Oral, 2 times daily    sacubitriL-valsartan (ENTRESTO) 49-51 mg per tablet 1 tablet, Oral, 2 times daily    spironolactone (ALDACTONE) 25 mg, Oral, Daily        Assessment & Plan     HTN (hypertension)  Patient's blood pressure in the office today is 136/84.  Patient is monitoring his blood pressure morning and night at home and gets similar readings as it office today.  I have increased his Entresto from 24-26 mg p.o. b.i.d. to 49-51 mg p.o. b.i.d..  Continue Imdur 30 mg  p.o. daily, metoprolol 25 mg p.o. b.i.d. and spironolactone 25 mg daily.  Discussed avoidance of high sodium foods.    Combined systolic and diastolic congestive heart failure  Patient is identified as having combined systolic and diastolic heart failure that is chronic.  CHF is currently controlled Latest ECHO performed and demonstrates- Results for orders placed during the hospital encounter of 03/07/23    Echo    Interpretation Summary  · The left ventricle is normal in size with mild eccentric hypertrophy and severely decreased systolic function.  · The estimated ejection fraction is 25%.  · Grade III left ventricular diastolic dysfunction.  · Normal right ventricular size with normal right ventricular systolic function.  · Mild right atrial enlargement.  · Moderate mitral regurgitation.  · Mild to moderate tricuspid regurgitation.  · Mild pulmonic regurgitation.  · Moderate left atrial enlargement.  . Continue Entresto, increased to 49-51 mg p.o. b.i.d., metoprolol 25 mg p.o. b.i.d., Imdur 30 mg p.o. daily, spironolactone 25 mg p.o. daily, and monitor clinical status closely. Patient is on the CHF pathway.  Monitor strict Is&Os and daily weights.  Place on fluid restriction of 1.5 L. Continue to stress to patient importance of self efficacy and  on diet for CHF. Last BNP reviewed- and noted below @LABRCNTIP(BNP,BNPTRIAGEBLO)@.   Latest Reference Range & Units Most Recent   BNP 0 - 99 pg/mL 135 (H)  3/10/23 04:27   (H): Data is abnormally high    Repeat echo ordered for early June and patient has appointment to see Dr. Marcus in mid June.  Patient insurance company refused payment for [employer] despite multiple attempts for approval including appeal.    Severe obesity (BMI >= 40)  Recommended heart healthy diet and low-impact exercise as tolerated.  Patient needs cardiac rehab but they are behind on getting to patient's.  I have ordered a referral today.          No follow-ups on file.

## 2023-03-21 NOTE — ASSESSMENT & PLAN NOTE
Recommended heart healthy diet and low-impact exercise as tolerated.  Patient needs cardiac rehab but they are behind on getting to patient's.  I have ordered a referral today.

## 2023-03-31 ENCOUNTER — TELEPHONE (OUTPATIENT)
Dept: FAMILY MEDICINE | Facility: CLINIC | Age: 28
End: 2023-03-31

## 2023-03-31 NOTE — TELEPHONE ENCOUNTER
Confirmed new patient appointment. Patient notified to bring medication bottles and policy regarding no show and late cancellation.  
Normal gait / station

## 2023-04-06 ENCOUNTER — OFFICE VISIT (OUTPATIENT)
Dept: FAMILY MEDICINE | Facility: CLINIC | Age: 28
End: 2023-04-06
Payer: COMMERCIAL

## 2023-04-06 VITALS
BODY MASS INDEX: 44.32 KG/M2 | WEIGHT: 282.38 LBS | HEIGHT: 67 IN | DIASTOLIC BLOOD PRESSURE: 70 MMHG | SYSTOLIC BLOOD PRESSURE: 110 MMHG | HEART RATE: 76 BPM | OXYGEN SATURATION: 96 %

## 2023-04-06 DIAGNOSIS — E66.01 SEVERE OBESITY (BMI >= 40): ICD-10-CM

## 2023-04-06 DIAGNOSIS — Z79.899 HIGH RISK MEDICATION USE: ICD-10-CM

## 2023-04-06 DIAGNOSIS — Z09 HOSPITAL DISCHARGE FOLLOW-UP: ICD-10-CM

## 2023-04-06 DIAGNOSIS — Z00.00 PHYSICAL EXAM: ICD-10-CM

## 2023-04-06 DIAGNOSIS — I50.42 CHRONIC COMBINED SYSTOLIC AND DIASTOLIC CONGESTIVE HEART FAILURE: ICD-10-CM

## 2023-04-06 DIAGNOSIS — R93.1 DECREASED CARDIAC EJECTION FRACTION: ICD-10-CM

## 2023-04-06 DIAGNOSIS — I10 HYPERTENSION, UNSPECIFIED TYPE: Primary | ICD-10-CM

## 2023-04-06 PROCEDURE — 1159F PR MEDICATION LIST DOCUMENTED IN MEDICAL RECORD: ICD-10-PCS | Mod: CPTII,S$GLB,, | Performed by: NURSE PRACTITIONER

## 2023-04-06 PROCEDURE — 99204 PR OFFICE/OUTPT VISIT, NEW, LEVL IV, 45-59 MIN: ICD-10-PCS | Mod: S$GLB,,, | Performed by: NURSE PRACTITIONER

## 2023-04-06 PROCEDURE — 3074F PR MOST RECENT SYSTOLIC BLOOD PRESSURE < 130 MM HG: ICD-10-PCS | Mod: CPTII,S$GLB,, | Performed by: NURSE PRACTITIONER

## 2023-04-06 PROCEDURE — 1159F MED LIST DOCD IN RCRD: CPT | Mod: CPTII,S$GLB,, | Performed by: NURSE PRACTITIONER

## 2023-04-06 PROCEDURE — 3074F SYST BP LT 130 MM HG: CPT | Mod: CPTII,S$GLB,, | Performed by: NURSE PRACTITIONER

## 2023-04-06 PROCEDURE — 4010F ACE/ARB THERAPY RXD/TAKEN: CPT | Mod: CPTII,S$GLB,, | Performed by: NURSE PRACTITIONER

## 2023-04-06 PROCEDURE — 1160F RVW MEDS BY RX/DR IN RCRD: CPT | Mod: CPTII,S$GLB,, | Performed by: NURSE PRACTITIONER

## 2023-04-06 PROCEDURE — 3044F PR MOST RECENT HEMOGLOBIN A1C LEVEL <7.0%: ICD-10-PCS | Mod: CPTII,S$GLB,, | Performed by: NURSE PRACTITIONER

## 2023-04-06 PROCEDURE — 3008F PR BODY MASS INDEX (BMI) DOCUMENTED: ICD-10-PCS | Mod: CPTII,S$GLB,, | Performed by: NURSE PRACTITIONER

## 2023-04-06 PROCEDURE — 3044F HG A1C LEVEL LT 7.0%: CPT | Mod: CPTII,S$GLB,, | Performed by: NURSE PRACTITIONER

## 2023-04-06 PROCEDURE — 99204 OFFICE O/P NEW MOD 45 MIN: CPT | Mod: S$GLB,,, | Performed by: NURSE PRACTITIONER

## 2023-04-06 PROCEDURE — 1111F DSCHRG MED/CURRENT MED MERGE: CPT | Mod: CPTII,S$GLB,, | Performed by: NURSE PRACTITIONER

## 2023-04-06 PROCEDURE — 3008F BODY MASS INDEX DOCD: CPT | Mod: CPTII,S$GLB,, | Performed by: NURSE PRACTITIONER

## 2023-04-06 PROCEDURE — 1160F PR REVIEW ALL MEDS BY PRESCRIBER/CLIN PHARMACIST DOCUMENTED: ICD-10-PCS | Mod: CPTII,S$GLB,, | Performed by: NURSE PRACTITIONER

## 2023-04-06 PROCEDURE — 3078F DIAST BP <80 MM HG: CPT | Mod: CPTII,S$GLB,, | Performed by: NURSE PRACTITIONER

## 2023-04-06 PROCEDURE — 4010F PR ACE/ARB THEARPY RXD/TAKEN: ICD-10-PCS | Mod: CPTII,S$GLB,, | Performed by: NURSE PRACTITIONER

## 2023-04-06 PROCEDURE — 1111F PR DISCHARGE MEDS RECONCILED W/ CURRENT OUTPATIENT MED LIST: ICD-10-PCS | Mod: CPTII,S$GLB,, | Performed by: NURSE PRACTITIONER

## 2023-04-06 PROCEDURE — 3078F PR MOST RECENT DIASTOLIC BLOOD PRESSURE < 80 MM HG: ICD-10-PCS | Mod: CPTII,S$GLB,, | Performed by: NURSE PRACTITIONER

## 2023-04-06 NOTE — PROGRESS NOTES
SUBJECTIVE:    Patient ID: Wenceslao Jeong is a 27 y.o. male.    Chief Complaint: Establish Care (Bottles brought/establish care/ bg)     27 year old male presents as new patient to [facility]. He was recently hospitalized. He presented to the ED with a Chest Pain (Acute onset of chest pain during exertion. Allevaited with rest. He reports that he  SOB had been present for  about 3 weeks worsening on last 2-3 days and now with cough with pink foamy sputum. Patient reports he was seen by his PCP because he had been SOB and was told he did not have pneumonia, however was started on BP medications (losartan 50 mg daily) February 1, 2023.  Patient further states he was supposed to follow up with PCP to see if the losartan was working but he missed the appointment because his job was short and he had to fill in.    Denies fever, chills, diaphoresis, dizziness, HA, chest pain, palpitations, NVD, recent trauma or any other associated symptoms. No aggravating and alleviating factor.  Does not smoke cigarettes, drink or do illegal drugs.    Lab and imaging obtained and reviewed. CBC shows H/H 12.6/37.1 MCV 77 MCH 26.3 RDW 14.9. CMP shows Na+ 133 AG 7 glucose 112 Ca+ 8.4 T. Bili 1.7. . Initial Troponin 25.9. EKG shows sinus tachycardia; with possible left atrial enlargement. CXR shows right mid and lower lung pneumonia. Started on IV ABX. However, BNP was elevated  His blood pressure was able to be controlled with a nitrate infusion transitioned to PO medications. He symptoms also improved with IV diuretics. Cardiology was consulted. TTE showed EF of 25% with grade III diastolic dysfunction as well as MR and TR. He is currently stable on room air and off diuretics. Entresto, Aldactone, isosorbide mononitrate and metoprolol have been started. He underwent coronary angiogram 3/14 which showed no evidence of coronary artery disease. He needs a Life Vest on discharge; however, his insurance policy/coverage has  decided that they will not cover the cost of this device. A peer to peer meeting took place with continued denial of the device; an appeal is to be instituted. He needs to follow up with his PCP and Cardiology and have a repeat TTE in three months.   Since being home has had follow up with cardio. Repeat echo ordered for early June and patient has appointment to see Dr. Marcus in mid June.  Patient insurance company refused payment for LifeVest despite multiple attempts for approval including appeal.  He has started his diet. Plans to start easing in to exercise. Taking meds as prescribed.     No results displayed because visit has over 200 results.          Past Medical History:   Diagnosis Date    CHF (congestive heart failure)     Hypertension      Social History     Socioeconomic History    Marital status: Single   Tobacco Use    Smoking status: Never    Smokeless tobacco: Never   Substance and Sexual Activity    Alcohol use: Not Currently    Drug use: Not Currently     Past Surgical History:   Procedure Laterality Date    LEFT HEART CATHETERIZATION Left 3/14/2023    Procedure: Left heart cath;  Surgeon: Joey Sousa MD;  Location: [facility] CATH/EP LAB;  Service: Cardiology;  Laterality: Left;     Family History   Problem Relation Age of Onset    Diabetes Mother     Heart disease Mother     Cancer Father     Drug abuse Maternal Aunt     Heart disease Maternal Grandmother     Drug abuse Maternal Grandfather     Hypertension Maternal Grandfather        Review of patient's allergies indicates:  No Known Allergies    Current Outpatient Medications:     isosorbide mononitrate (IMDUR) 30 MG 24 hr tablet, Take 1 tablet (30 mg total) by mouth once daily., Disp: 30 tablet, Rfl: 2    metoprolol tartrate (LOPRESSOR) 25 MG tablet, Take 1 tablet (25 mg total) by mouth 2 (two) times daily., Disp: 60 tablet, Rfl: 2    sacubitriL-valsartan (ENTRESTO) 49-51 mg per tablet, Take 1 tablet by mouth 2 (two) times daily., Disp:  "90 tablet, Rfl: 2    spironolactone (ALDACTONE) 25 MG tablet, Take 1 tablet (25 mg total) by mouth once daily., Disp: 30 tablet, Rfl: 2    albuterol (PROVENTIL/VENTOLIN HFA) 90 mcg/actuation inhaler, Inhale 2 puffs into the lungs every 4 (four) hours as needed., Disp: , Rfl:     Review of Systems   Constitutional:  Negative for fatigue, fever and unexpected weight change.   HENT:  Negative for ear pain, sinus pressure and sore throat.    Eyes:  Negative for pain.   Respiratory:  Negative for cough and shortness of breath.    Cardiovascular:  Negative for chest pain and leg swelling.   Gastrointestinal:  Negative for abdominal pain, constipation, nausea and vomiting.   Genitourinary:  Negative for dysuria, frequency and urgency.   Musculoskeletal:  Negative for arthralgias.   Skin:  Negative for rash.   Neurological:  Negative for dizziness, weakness and headaches.   Psychiatric/Behavioral:  Negative for sleep disturbance.         Objective:      Vitals:    04/06/23 1139   BP: 110/70   Pulse: 76   SpO2: 96%   Weight: 128.1 kg (282 lb 6.4 oz)   Height: 5' 7" (1.702 m)     Physical Exam  Vitals and nursing note reviewed.   Constitutional:       General: He is not in acute distress.     Appearance: Normal appearance. He is well-developed. He is obese.   HENT:      Head: Normocephalic and atraumatic.      Right Ear: External ear normal.      Left Ear: External ear normal.   Eyes:      Pupils: Pupils are equal, round, and reactive to light.   Neck:      Trachea: No tracheal deviation.   Cardiovascular:      Rate and Rhythm: Normal rate and regular rhythm.      Heart sounds: No murmur heard.    No friction rub. No gallop.   Pulmonary:      Breath sounds: Normal breath sounds. No stridor. No wheezing or rales.   Abdominal:      Palpations: Abdomen is soft. There is no mass.      Tenderness: There is no abdominal tenderness.   Musculoskeletal:         General: No tenderness or deformity.      Cervical back: Neck supple. "   Lymphadenopathy:      Cervical: No cervical adenopathy.   Skin:     General: Skin is warm and dry.   Neurological:      Mental Status: He is alert and oriented to person, place, and time.      Coordination: Coordination normal.   Psychiatric:         Thought Content: Thought content normal.         Assessment:       1. Hypertension, unspecified type    2. Chronic combined systolic and diastolic congestive heart failure    3. Severe obesity (BMI >= 40)    4. High risk medication use    5. Physical exam    6. Decreased cardiac ejection fraction    7. Hospital discharge follow-up         Plan:       Hypertension, unspecified type    Chronic combined systolic and diastolic congestive heart failure    Severe obesity (BMI >= 40)    High risk medication use    Physical exam    Decreased cardiac ejection fraction    Hospital discharge follow-up      Follow up in about 3 months (around 7/6/2023), or if symptoms worsen or fail to improve, for medication management.        4/16/2023 Aida Snow

## 2023-04-18 RX ORDER — SACUBITRIL AND VALSARTAN 24; 26 MG/1; MG/1
1 TABLET, FILM COATED ORAL 2 TIMES DAILY
Qty: 60 TABLET | Refills: 2 | OUTPATIENT
Start: 2023-04-18

## 2023-04-19 ENCOUNTER — TELEPHONE (OUTPATIENT)
Dept: CARDIOLOGY | Facility: CLINIC | Age: 28
End: 2023-04-19
Payer: COMMERCIAL

## 2023-04-19 NOTE — TELEPHONE ENCOUNTER
----- Message from Snow Mueller PharmD sent at 4/19/2023  8:12 AM CDT -----  Regarding: Entresto too expensive, Alternative?  Good morning,    Mr Jeong just called the pharmacy stating that the entresto is too expensive for him to afford on a monthly basis (due to deductible). He would like to know if [facility] can call him in something cheaper?    Thank you for your help!    CK Mueller, PharmD  [facility] - [place], [place]  [phone]

## 2023-04-21 ENCOUNTER — TELEPHONE (OUTPATIENT)
Dept: CARDIOLOGY | Facility: CLINIC | Age: 28
End: 2023-04-21
Payer: COMMERCIAL

## 2023-04-21 NOTE — TELEPHONE ENCOUNTER
----- Message from Gris Huynh, Patient Care Assistant sent at 4/21/2023 10:20 AM CDT -----  Regarding: advice  Contact: pt  Type: Needs Medical Advice    Who Called:  pt     Best Call Back Number: [phone] (home)     Additional Information: pt states he would like a callback regarding sacubitriL-valsartan (ENTRESTO) 49-51 mg per tablet being too expensive. Thanks!

## 2023-04-24 RX ORDER — LOSARTAN POTASSIUM 25 MG/1
25 TABLET ORAL 2 TIMES DAILY
Qty: 180 TABLET | Refills: 3 | Status: SHIPPED | OUTPATIENT
Start: 2023-04-24 | End: 2023-06-22 | Stop reason: ALTCHOICE

## 2023-06-09 ENCOUNTER — HOSPITAL ENCOUNTER (OUTPATIENT)
Dept: CARDIOLOGY | Facility: HOSPITAL | Age: 28
Discharge: HOME | End: 2023-06-09
Attending: NURSE PRACTITIONER
Payer: COMMERCIAL

## 2023-06-09 VITALS — BODY MASS INDEX: 44.26 KG/M2 | WEIGHT: 282 LBS | HEIGHT: 67 IN

## 2023-06-09 DIAGNOSIS — I50.9 CONGESTIVE HEART FAILURE, UNSPECIFIED HF CHRONICITY, UNSPECIFIED HEART FAILURE TYPE: ICD-10-CM

## 2023-06-09 PROCEDURE — 93306 ECHO (CUPID ONLY): ICD-10-PCS | Mod: 26,,, | Performed by: INTERNAL MEDICINE

## 2023-06-09 PROCEDURE — 93306 TTE W/DOPPLER COMPLETE: CPT

## 2023-06-09 PROCEDURE — 93306 TTE W/DOPPLER COMPLETE: CPT | Mod: 26,,, | Performed by: INTERNAL MEDICINE

## 2023-06-10 LAB
AORTIC ROOT ANNULUS: 3.1 CM
AORTIC VALVE CUSP SEPERATION: 1.9 CM
AV INDEX (PROSTH): 0.95
AV MEAN GRADIENT: 4 MMHG
AV PEAK GRADIENT: 7 MMHG
AV VALVE AREA: 3.27 CM2
AV VELOCITY RATIO: 0.88
BSA FOR ECHO PROCEDURE: 2.46 M2
CV ECHO LV RWT: 0.32 CM
DOP CALC AO PEAK VEL: 1.28 M/S
DOP CALC AO VTI: 25.9 CM
DOP CALC LVOT AREA: 3.5 CM2
DOP CALC LVOT DIAMETER: 2.1 CM
DOP CALC LVOT PEAK VEL: 1.12 M/S
DOP CALC LVOT STROKE VOLUME: 84.82 CM3
DOP CALCLVOT PEAK VEL VTI: 24.5 CM
E WAVE DECELERATION TIME: 232 MSEC
E/A RATIO: 2.7
E/E' RATIO: 11.13 M/S
ECHO LV POSTERIOR WALL: 1.1 CM (ref 0.6–1.1)
EJECTION FRACTION: 32 %
FRACTIONAL SHORTENING: 15 % (ref 28–44)
INTERVENTRICULAR SEPTUM: 1.03 CM (ref 0.6–1.1)
IVRT: 165 MSEC
LEFT INTERNAL DIMENSION IN SYSTOLE: 5.77 CM (ref 2.1–4)
LEFT VENTRICLE DIASTOLIC VOLUME INDEX: 102.99 ML/M2
LEFT VENTRICLE DIASTOLIC VOLUME: 241 ML
LEFT VENTRICLE MASS INDEX: 142 G/M2
LEFT VENTRICLE SYSTOLIC VOLUME INDEX: 70.5 ML/M2
LEFT VENTRICLE SYSTOLIC VOLUME: 165 ML
LEFT VENTRICULAR INTERNAL DIMENSION IN DIASTOLE: 6.82 CM (ref 3.5–6)
LEFT VENTRICULAR MASS: 333.15 G
LV LATERAL E/E' RATIO: 9.89 M/S
LV SEPTAL E/E' RATIO: 12.71 M/S
LVOT MG: 2 MMHG
LVOT MV: 0.7 CM/S
MV PEAK A VEL: 0.33 M/S
MV PEAK E VEL: 0.89 M/S
MV STENOSIS PRESSURE HALF TIME: 39 MS
MV VALVE AREA P 1/2 METHOD: 5.64 CM2
OHS LV EJECTION FRACTION SIMPSONS BIPLANE MOD: 3 %
PISA TR MAX VEL: 2.42 M/S
RA PRESSURE: 3 MMHG
RIGHT VENTRICULAR END-DIASTOLIC DIMENSION: 2.69 CM
TDI LATERAL: 0.09 M/S
TDI SEPTAL: 0.07 M/S
TDI: 0.08 M/S
TR MAX PG: 23 MMHG
TV REST PULMONARY ARTERY PRESSURE: 26 MMHG

## 2023-06-22 ENCOUNTER — OFFICE VISIT (OUTPATIENT)
Dept: CARDIOLOGY | Facility: CLINIC | Age: 28
End: 2023-06-22
Payer: COMMERCIAL

## 2023-06-22 VITALS
OXYGEN SATURATION: 96 % | HEIGHT: 67 IN | BODY MASS INDEX: 45.83 KG/M2 | RESPIRATION RATE: 16 BRPM | DIASTOLIC BLOOD PRESSURE: 80 MMHG | HEART RATE: 63 BPM | WEIGHT: 292 LBS | SYSTOLIC BLOOD PRESSURE: 134 MMHG

## 2023-06-22 DIAGNOSIS — I10 PRIMARY HYPERTENSION: ICD-10-CM

## 2023-06-22 DIAGNOSIS — E66.01 SEVERE OBESITY (BMI >= 40): ICD-10-CM

## 2023-06-22 DIAGNOSIS — I50.40 COMBINED SYSTOLIC AND DIASTOLIC CONGESTIVE HEART FAILURE, UNSPECIFIED HF CHRONICITY: ICD-10-CM

## 2023-06-22 PROCEDURE — 99999 PR PBB SHADOW E&M-EST. PATIENT-LVL III: ICD-10-PCS | Mod: PBBFAC,,, | Performed by: INTERNAL MEDICINE

## 2023-06-22 PROCEDURE — 1160F RVW MEDS BY RX/DR IN RCRD: CPT | Mod: CPTII,S$GLB,, | Performed by: INTERNAL MEDICINE

## 2023-06-22 PROCEDURE — 1159F PR MEDICATION LIST DOCUMENTED IN MEDICAL RECORD: ICD-10-PCS | Mod: CPTII,S$GLB,, | Performed by: INTERNAL MEDICINE

## 2023-06-22 PROCEDURE — 1159F MED LIST DOCD IN RCRD: CPT | Mod: CPTII,S$GLB,, | Performed by: INTERNAL MEDICINE

## 2023-06-22 PROCEDURE — 3079F DIAST BP 80-89 MM HG: CPT | Mod: CPTII,S$GLB,, | Performed by: INTERNAL MEDICINE

## 2023-06-22 PROCEDURE — 3044F HG A1C LEVEL LT 7.0%: CPT | Mod: CPTII,S$GLB,, | Performed by: INTERNAL MEDICINE

## 2023-06-22 PROCEDURE — 4010F PR ACE/ARB THEARPY RXD/TAKEN: ICD-10-PCS | Mod: CPTII,S$GLB,, | Performed by: INTERNAL MEDICINE

## 2023-06-22 PROCEDURE — 99214 OFFICE O/P EST MOD 30 MIN: CPT | Mod: S$GLB,,, | Performed by: INTERNAL MEDICINE

## 2023-06-22 PROCEDURE — 4010F ACE/ARB THERAPY RXD/TAKEN: CPT | Mod: CPTII,S$GLB,, | Performed by: INTERNAL MEDICINE

## 2023-06-22 PROCEDURE — 3079F PR MOST RECENT DIASTOLIC BLOOD PRESSURE 80-89 MM HG: ICD-10-PCS | Mod: CPTII,S$GLB,, | Performed by: INTERNAL MEDICINE

## 2023-06-22 PROCEDURE — 99999 PR PBB SHADOW E&M-EST. PATIENT-LVL III: CPT | Mod: PBBFAC,,, | Performed by: INTERNAL MEDICINE

## 2023-06-22 PROCEDURE — 1160F PR REVIEW ALL MEDS BY PRESCRIBER/CLIN PHARMACIST DOCUMENTED: ICD-10-PCS | Mod: CPTII,S$GLB,, | Performed by: INTERNAL MEDICINE

## 2023-06-22 PROCEDURE — 3008F BODY MASS INDEX DOCD: CPT | Mod: CPTII,S$GLB,, | Performed by: INTERNAL MEDICINE

## 2023-06-22 PROCEDURE — 3008F PR BODY MASS INDEX (BMI) DOCUMENTED: ICD-10-PCS | Mod: CPTII,S$GLB,, | Performed by: INTERNAL MEDICINE

## 2023-06-22 PROCEDURE — 3075F SYST BP GE 130 - 139MM HG: CPT | Mod: CPTII,S$GLB,, | Performed by: INTERNAL MEDICINE

## 2023-06-22 PROCEDURE — 3044F PR MOST RECENT HEMOGLOBIN A1C LEVEL <7.0%: ICD-10-PCS | Mod: CPTII,S$GLB,, | Performed by: INTERNAL MEDICINE

## 2023-06-22 PROCEDURE — 3075F PR MOST RECENT SYSTOLIC BLOOD PRESS GE 130-139MM HG: ICD-10-PCS | Mod: CPTII,S$GLB,, | Performed by: INTERNAL MEDICINE

## 2023-06-22 PROCEDURE — 99214 PR OFFICE/OUTPT VISIT, EST, LEVL IV, 30-39 MIN: ICD-10-PCS | Mod: S$GLB,,, | Performed by: INTERNAL MEDICINE

## 2023-06-22 RX ORDER — SPIRONOLACTONE 25 MG/1
25 TABLET ORAL DAILY
Qty: 90 TABLET | Refills: 3 | Status: SHIPPED | OUTPATIENT
Start: 2023-06-22 | End: 2024-06-21

## 2023-06-22 RX ORDER — SPIRONOLACTONE 25 MG/1
25 TABLET ORAL DAILY
Qty: 90 TABLET | Refills: 3 | Status: SHIPPED | OUTPATIENT
Start: 2023-06-22 | End: 2023-06-22

## 2023-06-22 RX ORDER — SACUBITRIL AND VALSARTAN 24; 26 MG/1; MG/1
1 TABLET, FILM COATED ORAL 2 TIMES DAILY
Qty: 180 TABLET | Refills: 3 | Status: SHIPPED | OUTPATIENT
Start: 2023-06-22 | End: 2023-08-29

## 2023-06-22 RX ORDER — ISOSORBIDE MONONITRATE 30 MG/1
30 TABLET, EXTENDED RELEASE ORAL DAILY
Qty: 90 TABLET | Refills: 3 | Status: SHIPPED | OUTPATIENT
Start: 2023-06-22 | End: 2024-06-21

## 2023-06-22 RX ORDER — SACUBITRIL AND VALSARTAN 24; 26 MG/1; MG/1
1 TABLET, FILM COATED ORAL 2 TIMES DAILY
Qty: 180 TABLET | Refills: 3 | Status: SHIPPED | OUTPATIENT
Start: 2023-06-22 | End: 2023-06-22

## 2023-06-22 RX ORDER — ISOSORBIDE MONONITRATE 30 MG/1
30 TABLET, EXTENDED RELEASE ORAL DAILY
Qty: 90 TABLET | Refills: 3 | Status: SHIPPED | OUTPATIENT
Start: 2023-06-22 | End: 2023-06-22

## 2023-06-22 NOTE — ASSESSMENT & PLAN NOTE
Blood pressure is much better controlled 134/80 mmHg maintain low-salt diet as above will change to Entresto 24/26 mg p.o. b.i.d. and then continue on other medications same doses including metoprolol 25 mg p.o. b.i.d.

## 2023-06-22 NOTE — ASSESSMENT & PLAN NOTE
Continue on aggressive diet management and fluid management associated with increased physical activity.

## 2023-06-22 NOTE — PROGRESS NOTES
Subjective:    Patient ID:  Wenceslao Jeong is a 28 y.o. male patient here for evaluation Results (Echo results)      History of Present Illness:   Patient is a 28-year-old gentleman who is seeking follow-up evaluation after his echocardiogram.  He presents with acute onset of shortness of breath and chest discomfort workup was consistent with significant LV dysfunction congestive heart failure with a BNP of 497 troponin was 25.  He had sinus tachycardia at that time and right lower lobe and right middle lobe pneumonia as well.  If with the treatment he was improved and discharged after 9 days of hospitalization.    Clinically since his discharge is doing better denies having any cough or congestion no fevers or chills and denies having any symptoms of PND orthopnea or any pedal edema and no chest he is doing much better.  He is initially ejection fraction was 25% in March with upper optimal treatment has improved to 32%.  Continue on present therapy  Clinically he is doing better      Patient Class: IP- Inpatient  Admission Date: 3/7/2023  Hospital Length of Stay: 9 days  Discharge Date and Time: No discharge date for patient encounter.  Attending Physician: Andrea Corrales MD   Discharging Provider: Andrea Corrales MD  Primary Care Provider: BELKYS Wheeler     Primary Care Team: Networked reference to record PCT      HPI:   Wenceslao Jeong is a 27 y.o. male with a history as  has no past medical history on file. who presented to the ED with a Chest Pain (Acute onset of chest pain during exertion. Allevaited with rest. Pt reports hx of MEJIA. Hx of HTN. )     Patient presents to the ED with a c/o SOB for about 3 weeks worsening on last 2-3 days and now with cough with pink foamy sputum. Patient reports he was seen by his PCP because he had been SOB and was told he did not have pneumonia, however was started on BP medications (losartan 50 mg daily) February 1, 2023.  Patient further states he was supposed to  "follow up with PCP to see if the losartan was working but he missed the appointment because his job was short and he had to fill in.      Denies fever, chills, diaphoresis, dizziness, HA, chest pain, palpitations, NVD, recent trauma or any other associated symptoms. No aggravating and alleviating factor.  Does not smoke cigarettes, drink or do illegal drugs.      Lab and imaging obtained and reviewed. CBC shows H/H 12.6/37.1 MCV 77 MCH 26.3 RDW 14.9. CMP shows Na+ 133 AG 7 glucose 112 Ca+ 8.4 T. Bili 1.7. . Initial Troponin 25.9. EKG shows sinus tachycardia; with possible left atrial enlargement. CXR shows right mid and lower lung pneumonia. On admit, temp 99.3 Hr 114 RR 16 /110 sats 95% on RA           Per ED provider, patient presented with SOB and productive cough "pink frothy sputum". CXR was completed and showed right mid/lower PNA. Started on IV ABX. However, BNP was elevated and given symptom, a bedside Echo was completed which per ED provider more consistent with HF with EF approx 50%. BP significantly elevated tridil gtt initiated, with good BP controlled, tridil gtt discontinued. Patient was also noted to have a decrease in sats to 91% on 5 liters O2, placed on Bipap with O2 sats improving, now currently on 2L o2 per NC with O2 sats 96% on assessment.            Review of patient's allergies indicates:  No Known Allergies    Past Medical History:   Diagnosis Date    CHF (congestive heart failure)     Hypertension      Past Surgical History:   Procedure Laterality Date    LEFT HEART CATHETERIZATION Left 3/14/2023    Procedure: Left heart cath;  Surgeon: Joey Sousa MD;  Location: [facility] CATH/EP LAB;  Service: Cardiology;  Laterality: Left;     Social History     Tobacco Use    Smoking status: Never    Smokeless tobacco: Never   Substance Use Topics    Alcohol use: Not Currently    Drug use: Not Currently        Review of Systems:    As noted in HPI in addition      REVIEW OF " SYSTEMS  CARDIOVASCULAR: No recent chest pain, palpitations, arm, neck, or jaw pain  RESPIRATORY: No recent fever, cough chills, SOB or congestion  : No blood in the urine  GI: No Nausea, vomiting, constipation, diarrhea, blood, or reflux.  MUSCULOSKELETAL: No myalgias  NEURO: No lightheadedness or dizziness  EYES: No Double vision, blurry, vision or headache              Objective        Vitals:    06/22/23 0931   BP: 134/80   Pulse: 63   Resp: 16       LIPIDS - LAST 2   Lab Results   Component Value Date    CHOL 179 03/08/2023    HDL 55 03/08/2023    LDLCALC 105.2 03/08/2023    TRIG 94 03/08/2023    CHOLHDL 30.7 03/08/2023       CBC - LAST 2  Lab Results   Component Value Date    WBC 7.56 03/16/2023    WBC 9.32 03/15/2023    RBC 4.80 03/16/2023    RBC 4.98 03/15/2023    HGB 12.7 (L) 03/16/2023    HGB 13.0 (L) 03/15/2023    HCT 37.7 (L) 03/16/2023    HCT 38.9 (L) 03/15/2023    MCV 79 (L) 03/16/2023    MCV 78 (L) 03/15/2023    MCH 26.5 (L) 03/16/2023    MCH 26.1 (L) 03/15/2023    MCHC 33.7 03/16/2023    MCHC 33.4 03/15/2023    RDW 15.5 (H) 03/16/2023    RDW 15.3 (H) 03/15/2023     03/16/2023     03/15/2023    MPV 11.3 03/16/2023    MPV 10.8 03/15/2023    GRAN 2.9 03/16/2023    GRAN 38.0 03/16/2023    LYMPH 3.5 03/16/2023    LYMPH 46.6 03/16/2023    MONO 0.9 03/16/2023    MONO 11.9 03/16/2023    BASO 0.06 03/16/2023    BASO 0.05 03/15/2023    NRBC 0 03/16/2023    NRBC 0 03/15/2023       CHEMISTRY & LIVER FUNCTION - LAST 2  Lab Results   Component Value Date     (L) 03/16/2023     03/15/2023    K 4.2 03/16/2023    K 4.3 03/15/2023     03/16/2023     03/15/2023    CO2 24 03/16/2023    CO2 24 03/15/2023    ANIONGAP 8 03/16/2023    ANIONGAP 8 03/15/2023    BUN 15 03/16/2023    BUN 14 03/15/2023    CREATININE 1.2 03/16/2023    CREATININE 1.2 03/15/2023     03/16/2023     03/15/2023    CALCIUM 8.6 (L) 03/16/2023    CALCIUM 8.6 (L) 03/15/2023    PH 7.393 03/07/2023    MG  2.1 03/16/2023    MG 2.1 03/15/2023    ALBUMIN 3.6 03/14/2023    ALBUMIN 3.5 03/13/2023    PROT 7.0 03/14/2023    PROT 7.1 03/13/2023    ALKPHOS 54 (L) 03/14/2023    ALKPHOS 51 (L) 03/13/2023    ALT 41 03/14/2023    ALT 38 03/13/2023    AST 29 03/14/2023    AST 34 03/13/2023    BILITOT 0.5 03/14/2023    BILITOT 0.5 03/13/2023        CARDIAC PROFILE - LAST 2  Lab Results   Component Value Date     (H) 03/10/2023     (H) 03/07/2023     (H) 03/07/2023    TROPONINIHS 27.1 (H) 03/08/2023    TROPONINIHS 25.3 (H) 03/07/2023        COAGULATION - LAST 2  Lab Results   Component Value Date    INR 1.1 03/13/2023    APTT 31.0 03/13/2023       ENDOCRINE & PSA - LAST 2  Lab Results   Component Value Date    HGBA1C 5.8 03/08/2023    TSH 1.300 03/08/2023    PROCAL 0.05 03/07/2023        ECHOCARDIOGRAM RESULTS  Results for orders placed during the hospital encounter of 06/09/23    Echo    Interpretation Summary  · The left ventricle is moderately enlarged with moderately decreased systolic function.  · Grade III left ventricular diastolic dysfunction.  · The estimated ejection fraction is 32%.  · There is moderate left ventricular global hypokinesis.  · Normal right ventricular size with normal right ventricular systolic function.  · Mild left atrial enlargement.  · Mild mitral regurgitation.  · Mild tricuspid regurgitation.  · Normal central venous pressure (3 mmHg).  · The estimated PA systolic pressure is 26 mmHg.    Summary    March 2023    The left ventricle is normal in size with mild eccentric hypertrophy and severely decreased systolic function.  The estimated ejection fraction is 25%.  Grade III left ventricular diastolic dysfunction.  Normal right ventricular size with normal right ventricular systolic function.  Mild right atrial enlargement.  Moderate mitral regurgitation.  Mild to moderate tricuspid regurgitation.  Mild pulmonic regurgitation.  Moderate left atrial enlargement.          CURRENT/PREVIOUS VISIT EKG  Results for orders placed or performed during the hospital encounter of 03/07/23   EKG 12-lead    Collection Time: 03/13/23  9:47 AM    Narrative    Test Reason : I50.9,I42.9,    Vent. Rate : 107 BPM     Atrial Rate : 107 BPM     P-R Int : 166 ms          QRS Dur : 100 ms      QT Int : 368 ms       P-R-T Axes : 046 012 059 degrees     QTc Int : 491 ms    Sinus tachycardia  Possible Left atrial enlargement  Borderline Abnormal ECG  When compared with ECG of 08-MAR-2023 07:46,  Nonspecific T wave abnormality no longer evident in Inferior leads  Confirmed by Mani Marcus MD ([id number]) on 3/13/2023 4:54:59 PM    Referred By: LY   SELF           Confirmed By:Mani Marcus MD     No valid procedures specified.   No results found for this or any previous visit.    No valid procedures specified.    PHYSICAL EXAM  CONSTITUTIONAL: Well built, well nourished in no apparent distress  NECK: no carotid bruit, no JVD  LUNGS: CTA  CHEST WALL: no tenderness  HEART: regular rate and rhythm, S1, S2 normal, no murmur, click, rub or gallop   ABDOMEN: soft, non-tender; bowel sounds normal; no masses,  no organomegaly  EXTREMITIES: Extremities normal, no edema, no calf tenderness noted  NEURO: AAO X 3    I HAVE REVIEWED :    The vital signs, nurses notes, and all the pertinent radiology and labs.        Current Outpatient Medications   Medication Instructions    albuterol (PROVENTIL/VENTOLIN HFA) 90 mcg/actuation inhaler 2 puffs, Inhalation, Every 4 hours PRN    isosorbide mononitrate (IMDUR) 30 mg, Oral, Daily    metoprolol tartrate (LOPRESSOR) 25 mg, Oral, 2 times daily    sacubitriL-valsartan (ENTRESTO) 24-26 mg per tablet 1 tablet, Oral, 2 times daily    spironolactone (ALDACTONE) 25 mg, Oral, Daily          Assessment & Plan     Combined systolic and diastolic congestive heart failure  Patient is identified as having Combined Systolic and Diastolic heart failure that is Chronic. CHF is currently  controlled. Latest ECHO performed and demonstrates- Results for orders placed during the hospital encounter of 06/09/23    Echo    Interpretation Summary  · The left ventricle is moderately enlarged with moderately decreased systolic function.  · Grade III left ventricular diastolic dysfunction.  · The estimated ejection fraction is 32%.  · There is moderate left ventricular global hypokinesis.  · Normal right ventricular size with normal right ventricular systolic function.  · Mild left atrial enlargement.  · Mild mitral regurgitation.  · Mild tricuspid regurgitation.  · Normal central venous pressure (3 mmHg).  · The estimated PA systolic pressure is 26 mmHg.  . Continue ACE/ARB, Furosemide and Aldactone , arm metoprolol tartrate 25 mg b.i.d. and monitor clinical status closely. Monitor on telemetry. Patient is on CHF pathway.  Monitor strict Is&Os and daily weights.  Place on fluid restriction of 1.5 L. Continue to stress to patient importance of self efficacy and  on diet for CHF.       Severe obesity (BMI >= 40)  Continue on aggressive diet management and fluid management associated with increased physical activity.    HTN (hypertension)  Blood pressure is much better controlled 134/80 mmHg maintain low-salt diet as above will change to Entresto 24/26 mg p.o. b.i.d. and then continue on other medications same doses including metoprolol 25 mg p.o. b.i.d.          No follow-ups on file.

## 2023-06-22 NOTE — ASSESSMENT & PLAN NOTE
Patient is identified as having Combined Systolic and Diastolic heart failure that is Chronic. CHF is currently controlled. Latest ECHO performed and demonstrates- Results for orders placed during the hospital encounter of 06/09/23    Echo    Interpretation Summary  · The left ventricle is moderately enlarged with moderately decreased systolic function.  · Grade III left ventricular diastolic dysfunction.  · The estimated ejection fraction is 32%.  · There is moderate left ventricular global hypokinesis.  · Normal right ventricular size with normal right ventricular systolic function.  · Mild left atrial enlargement.  · Mild mitral regurgitation.  · Mild tricuspid regurgitation.  · Normal central venous pressure (3 mmHg).  · The estimated PA systolic pressure is 26 mmHg.  . Continue ACE/ARB, Furosemide and Aldactone , arm metoprolol tartrate 25 mg b.i.d. and monitor clinical status closely. Monitor on telemetry. Patient is on CHF pathway.  Monitor strict Is&Os and daily weights.  Place on fluid restriction of 1.5 L. Continue to stress to patient importance of self efficacy and  on diet for CHF.

## 2023-07-03 ENCOUNTER — TELEPHONE (OUTPATIENT)
Dept: FAMILY MEDICINE | Facility: CLINIC | Age: 28
End: 2023-07-03

## 2023-07-03 NOTE — TELEPHONE ENCOUNTER
----- Message from Gibson Valle sent at 7/3/2023 12:47 PM CDT -----  Pt needs to reschedule appt. I didn't see any available appt.  [phone]

## 2023-07-03 NOTE — TELEPHONE ENCOUNTER
----- Message from Gibson Valle sent at 7/3/2023 12:47 PM CDT -----  Pt needs to reschedule appt. I didn't see any available appt.  [phone]

## 2023-07-13 RX ORDER — METOPROLOL TARTRATE 25 MG/1
25 TABLET, FILM COATED ORAL 2 TIMES DAILY
Qty: 60 TABLET | Refills: 2 | Status: CANCELLED | OUTPATIENT
Start: 2023-07-13 | End: 2024-07-12

## 2023-07-14 RX ORDER — METOPROLOL TARTRATE 25 MG/1
25 TABLET, FILM COATED ORAL 2 TIMES DAILY
Qty: 60 TABLET | Refills: 2 | OUTPATIENT
Start: 2023-07-14 | End: 2024-07-13

## 2023-08-01 ENCOUNTER — OFFICE VISIT (OUTPATIENT)
Dept: FAMILY MEDICINE | Facility: CLINIC | Age: 28
End: 2023-08-01
Payer: COMMERCIAL

## 2023-08-01 VITALS
HEIGHT: 67 IN | OXYGEN SATURATION: 97 % | WEIGHT: 298 LBS | DIASTOLIC BLOOD PRESSURE: 88 MMHG | HEART RATE: 64 BPM | SYSTOLIC BLOOD PRESSURE: 138 MMHG | BODY MASS INDEX: 46.77 KG/M2

## 2023-08-01 DIAGNOSIS — Z79.899 HIGH RISK MEDICATION USE: ICD-10-CM

## 2023-08-01 DIAGNOSIS — I50.40 COMBINED SYSTOLIC AND DIASTOLIC CONGESTIVE HEART FAILURE, UNSPECIFIED HF CHRONICITY: ICD-10-CM

## 2023-08-01 DIAGNOSIS — F32.A DEPRESSION, UNSPECIFIED DEPRESSION TYPE: ICD-10-CM

## 2023-08-01 DIAGNOSIS — R93.1 DECREASED CARDIAC EJECTION FRACTION: Primary | ICD-10-CM

## 2023-08-01 DIAGNOSIS — I10 HYPERTENSION, UNSPECIFIED TYPE: ICD-10-CM

## 2023-08-01 PROCEDURE — 1159F PR MEDICATION LIST DOCUMENTED IN MEDICAL RECORD: ICD-10-PCS | Mod: CPTII,S$GLB,, | Performed by: NURSE PRACTITIONER

## 2023-08-01 PROCEDURE — 4010F PR ACE/ARB THEARPY RXD/TAKEN: ICD-10-PCS | Mod: CPTII,S$GLB,, | Performed by: NURSE PRACTITIONER

## 2023-08-01 PROCEDURE — 3044F HG A1C LEVEL LT 7.0%: CPT | Mod: CPTII,S$GLB,, | Performed by: NURSE PRACTITIONER

## 2023-08-01 PROCEDURE — 3075F PR MOST RECENT SYSTOLIC BLOOD PRESS GE 130-139MM HG: ICD-10-PCS | Mod: CPTII,S$GLB,, | Performed by: NURSE PRACTITIONER

## 2023-08-01 PROCEDURE — 3079F DIAST BP 80-89 MM HG: CPT | Mod: CPTII,S$GLB,, | Performed by: NURSE PRACTITIONER

## 2023-08-01 PROCEDURE — 3075F SYST BP GE 130 - 139MM HG: CPT | Mod: CPTII,S$GLB,, | Performed by: NURSE PRACTITIONER

## 2023-08-01 PROCEDURE — 3008F PR BODY MASS INDEX (BMI) DOCUMENTED: ICD-10-PCS | Mod: CPTII,S$GLB,, | Performed by: NURSE PRACTITIONER

## 2023-08-01 PROCEDURE — 99214 PR OFFICE/OUTPT VISIT, EST, LEVL IV, 30-39 MIN: ICD-10-PCS | Mod: S$GLB,,, | Performed by: NURSE PRACTITIONER

## 2023-08-01 PROCEDURE — 3079F PR MOST RECENT DIASTOLIC BLOOD PRESSURE 80-89 MM HG: ICD-10-PCS | Mod: CPTII,S$GLB,, | Performed by: NURSE PRACTITIONER

## 2023-08-01 PROCEDURE — 3008F BODY MASS INDEX DOCD: CPT | Mod: CPTII,S$GLB,, | Performed by: NURSE PRACTITIONER

## 2023-08-01 PROCEDURE — 1160F RVW MEDS BY RX/DR IN RCRD: CPT | Mod: CPTII,S$GLB,, | Performed by: NURSE PRACTITIONER

## 2023-08-01 PROCEDURE — 1159F MED LIST DOCD IN RCRD: CPT | Mod: CPTII,S$GLB,, | Performed by: NURSE PRACTITIONER

## 2023-08-01 PROCEDURE — 1160F PR REVIEW ALL MEDS BY PRESCRIBER/CLIN PHARMACIST DOCUMENTED: ICD-10-PCS | Mod: CPTII,S$GLB,, | Performed by: NURSE PRACTITIONER

## 2023-08-01 PROCEDURE — 3044F PR MOST RECENT HEMOGLOBIN A1C LEVEL <7.0%: ICD-10-PCS | Mod: CPTII,S$GLB,, | Performed by: NURSE PRACTITIONER

## 2023-08-01 PROCEDURE — 4010F ACE/ARB THERAPY RXD/TAKEN: CPT | Mod: CPTII,S$GLB,, | Performed by: NURSE PRACTITIONER

## 2023-08-01 PROCEDURE — 99214 OFFICE O/P EST MOD 30 MIN: CPT | Mod: S$GLB,,, | Performed by: NURSE PRACTITIONER

## 2023-08-01 RX ORDER — ESCITALOPRAM OXALATE 10 MG/1
10 TABLET ORAL DAILY
Qty: 30 TABLET | Refills: 11 | Status: SHIPPED | OUTPATIENT
Start: 2023-08-01 | End: 2023-08-29

## 2023-08-02 LAB
ALBUMIN SERPL-MCNC: 4.1 G/DL (ref 3.6–5.1)
ALBUMIN/GLOB SERPL: 1.4 (CALC) (ref 1–2.5)
ALP SERPL-CCNC: 43 U/L (ref 36–130)
ALT SERPL-CCNC: 16 U/L (ref 9–46)
AST SERPL-CCNC: 19 U/L (ref 10–40)
BILIRUB SERPL-MCNC: 0.3 MG/DL (ref 0.2–1.2)
BUN SERPL-MCNC: 12 MG/DL (ref 7–25)
BUN/CREAT SERPL: NORMAL (CALC) (ref 6–22)
CALCIUM SERPL-MCNC: 9.2 MG/DL (ref 8.6–10.3)
CHLORIDE SERPL-SCNC: 103 MMOL/L (ref 98–110)
CHOLEST SERPL-MCNC: 185 MG/DL
CHOLEST/HDLC SERPL: 2.6 (CALC)
CO2 SERPL-SCNC: 23 MMOL/L (ref 20–32)
CREAT SERPL-MCNC: 1.04 MG/DL (ref 0.6–1.24)
EGFR: 100 ML/MIN/1.73M2
GLOBULIN SER CALC-MCNC: 3 G/DL (CALC) (ref 1.9–3.7)
GLUCOSE SERPL-MCNC: 98 MG/DL (ref 65–139)
HDLC SERPL-MCNC: 70 MG/DL
LDLC SERPL CALC-MCNC: 81 MG/DL (CALC)
NONHDLC SERPL-MCNC: 115 MG/DL (CALC)
POTASSIUM SERPL-SCNC: 3.9 MMOL/L (ref 3.5–5.3)
PROT SERPL-MCNC: 7.1 G/DL (ref 6.1–8.1)
SODIUM SERPL-SCNC: 139 MMOL/L (ref 135–146)
TRIGL SERPL-MCNC: 259 MG/DL
TSH SERPL-ACNC: 1.39 MIU/L (ref 0.4–4.5)

## 2023-08-03 RX ORDER — METOPROLOL TARTRATE 25 MG/1
25 TABLET, FILM COATED ORAL 2 TIMES DAILY
Qty: 60 TABLET | Refills: 2 | OUTPATIENT
Start: 2023-08-03 | End: 2024-08-02

## 2023-08-10 ENCOUNTER — TELEPHONE (OUTPATIENT)
Dept: FAMILY MEDICINE | Facility: CLINIC | Age: 28
End: 2023-08-10

## 2023-08-10 NOTE — TELEPHONE ENCOUNTER
Spoke with pt in regards to recent lab results. Verbalized per Aida that pt's triglycerides are elevated. Aida would like for you to start a low fat diet. Rest of labs are normal. Pt acknowledged understanding.

## 2023-08-10 NOTE — TELEPHONE ENCOUNTER
----- Message from Aida Snow NP sent at 8/10/2023 12:50 AM CDT -----  Triglycerides are elevated. Start low fat diet. Rest of labs are normal

## 2023-08-14 NOTE — PROGRESS NOTES
SUBJECTIVE:    Patient ID: Wencelsao Jeong is a 28 y.o. male.    Chief Complaint: Follow-up (No bottles, Follow up, discuss weight loss options//Ba )     28 year old male presents for check up. Patient is being treated for htn, chf. Being followed by dr. Marcus. Had echo performed in June. Ejection fraction improved to 32%. No watching diet for exercising. Bp in office is well controlled. Feels down lately. Tearful at times. Not taking medication        Office Visit on 08/01/2023   Component Date Value Ref Range Status    Glucose 08/01/2023 98  65 - 139 mg/dL Final    BUN 08/01/2023 12  7 - 25 mg/dL Final    Creatinine 08/01/2023 1.04  0.60 - 1.24 mg/dL Final    eGFR 08/01/2023 100  > OR = 60 mL/min/1.73m2 Final    BUN/Creatinine Ratio 08/01/2023 SEE NOTE:  6 - 22 (calc) Final    Sodium 08/01/2023 139  135 - 146 mmol/L Final    Potassium 08/01/2023 3.9  3.5 - 5.3 mmol/L Final    Chloride 08/01/2023 103  98 - 110 mmol/L Final    CO2 08/01/2023 23  20 - 32 mmol/L Final    Calcium 08/01/2023 9.2  8.6 - 10.3 mg/dL Final    Total Protein 08/01/2023 7.1  6.1 - 8.1 g/dL Final    Albumin 08/01/2023 4.1  3.6 - 5.1 g/dL Final    Globulin, Total 08/01/2023 3.0  1.9 - 3.7 g/dL (calc) Final    Albumin/Globulin Ratio 08/01/2023 1.4  1.0 - 2.5 (calc) Final    Total Bilirubin 08/01/2023 0.3  0.2 - 1.2 mg/dL Final    Alkaline Phosphatase 08/01/2023 43  36 - 130 U/L Final    AST 08/01/2023 19  10 - 40 U/L Final    ALT 08/01/2023 16  9 - 46 U/L Final    Cholesterol 08/01/2023 185  <200 mg/dL Final    HDL 08/01/2023 70  > OR = 40 mg/dL Final    Triglycerides 08/01/2023 259 (H)  <150 mg/dL Final    LDL Cholesterol 08/01/2023 81  mg/dL (calc) Final    HDL/Cholesterol Ratio 08/01/2023 2.6  <5.0 (calc) Final    Non HDL Chol. (LDL+VLDL) 08/01/2023 115  <130 mg/dL (calc) Final    TSH w/reflex to FT4 08/01/2023 1.39  0.40 - 4.50 mIU/L Final   Hospital Outpatient Visit on 06/09/2023   Component Date Value Ref Range Status    BSA 06/09/2023  2.46  m2 Final    TDI SEPTAL 06/09/2023 0.07  m/s Final    LV LATERAL E/E' RATIO 06/09/2023 9.89  m/s Final    LV SEPTAL E/E' RATIO 06/09/2023 12.71  m/s Final    Left Ventricular Outflow Tract Leonela* 06/09/2023 0.70  cm/s Final    Left Ventricular Outflow Tract Leonela* 06/09/2023 2.00  mmHg Final    AORTIC VALVE CUSP SEPERATION 06/09/2023 1.90  cm Final    TDI LATERAL 06/09/2023 0.09  m/s Final    LVIDd 06/09/2023 6.82 (A)  3.5 - 6.0 cm Final    IVS 06/09/2023 1.03  0.6 - 1.1 cm Final    Posterior Wall 06/09/2023 1.10  0.6 - 1.1 cm Final    Ao root annulus 06/09/2023 3.10  cm Final    LVIDs 06/09/2023 5.77 (A)  2.1 - 4.0 cm Final    FS 06/09/2023 15  28 - 44 % Final    LV mass 06/09/2023 333.15  g Final    RVDD 06/09/2023 2.69  cm Final    Left Ventricle Relative Wall Thick* 06/09/2023 0.32  cm Final    AV mean gradient 06/09/2023 4  mmHg Final    AV valve area 06/09/2023 3.27  cm2 Final    AV Velocity Ratio 06/09/2023 0.88   Final    AV index (prosthetic) 06/09/2023 0.95   Final    MV valve area p 1/2 method 06/09/2023 5.64  cm2 Final    E/A ratio 06/09/2023 2.70   Final    Mean e' 06/09/2023 0.08  m/s Final    E wave deceleration time 06/09/2023 232.00  msec Final    IVRT 06/09/2023 165.00  msec Final    LVOT diameter 06/09/2023 2.10  cm Final    LVOT area 06/09/2023 3.5  cm2 Final    LVOT peak mino 06/09/2023 1.12  m/s Final    LVOT peak VTI 06/09/2023 24.50  cm Final    Ao peak mino 06/09/2023 1.28  m/s Final    Ao VTI 06/09/2023 25.9  cm Final    LVOT stroke volume 06/09/2023 84.82  cm3 Final    AV peak gradient 06/09/2023 7  mmHg Final    E/E' ratio 06/09/2023 11.13  m/s Final    MV Peak E Mino 06/09/2023 0.89  m/s Final    TR Max Mino 06/09/2023 2.42  m/s Final    MV stenosis pressure 1/2 time 06/09/2023 39.00  ms Final    MV Peak A Mino 06/09/2023 0.33  m/s Final    LV Systolic Volume 06/09/2023 165.00  mL Final    LV Systolic Volume Index 06/09/2023 70.5  mL/m2 Final    LV Diastolic Volume 06/09/2023 241.00  mL  Final    LV Diastolic Volume Index 06/09/2023 102.99  mL/m2 Final    LV Mass Index 06/09/2023 142  g/m2 Final    Triscuspid Valve Regurgitation Pea* 06/09/2023 23  mmHg Final    Oglesby's Biplane MOD Ejection Fra* 06/09/2023 3  % Final    Right Atrial Pressure (from IVC) 06/09/2023 3  mmHg Final    EF 06/09/2023 32  % Final    TV resting pulmonary artery pressu* 06/09/2023 26  mmHg Final   No results displayed because visit has over 200 results.          Past Medical History:   Diagnosis Date    CHF (congestive heart failure)     Hypertension      Social History     Socioeconomic History    Marital status: Single   Tobacco Use    Smoking status: Never    Smokeless tobacco: Never   Substance and Sexual Activity    Alcohol use: Not Currently    Drug use: Not Currently     Past Surgical History:   Procedure Laterality Date    LEFT HEART CATHETERIZATION Left 3/14/2023    Procedure: Left heart cath;  Surgeon: Joey Sousa MD;  Location: [facility] CATH/EP LAB;  Service: Cardiology;  Laterality: Left;     Family History   Problem Relation Age of Onset    Diabetes Mother     Heart disease Mother     Cancer Father     Drug abuse Maternal Aunt     Heart disease Maternal Grandmother     Drug abuse Maternal Grandfather     Hypertension Maternal Grandfather        All of your core healthy metrics are met.      Review of patient's allergies indicates:  No Known Allergies    Current Outpatient Medications:     isosorbide mononitrate (IMDUR) 30 MG 24 hr tablet, Take 1 tablet (30 mg total) by mouth once daily., Disp: 90 tablet, Rfl: 3    metoprolol tartrate (LOPRESSOR) 25 MG tablet, Take 1 tablet (25 mg total) by mouth 2 (two) times daily., Disp: 60 tablet, Rfl: 2    sacubitriL-valsartan (ENTRESTO) 24-26 mg per tablet, Take 1 tablet by mouth 2 (two) times daily., Disp: 180 tablet, Rfl: 3    spironolactone (ALDACTONE) 25 MG tablet, Take 1 tablet (25 mg total) by mouth once daily., Disp: 90 tablet, Rfl: 3    albuterol  "(PROVENTIL/VENTOLIN HFA) 90 mcg/actuation inhaler, Inhale 2 puffs into the lungs every 4 (four) hours as needed., Disp: , Rfl:     EScitalopram oxalate (LEXAPRO) 10 MG tablet, Take 1 tablet (10 mg total) by mouth once daily., Disp: 30 tablet, Rfl: 11    Review of Systems   Constitutional:  Negative for fatigue, fever and unexpected weight change.   HENT:  Negative for ear pain, sinus pressure and sore throat.    Eyes:  Negative for pain.   Respiratory:  Negative for cough and shortness of breath.    Cardiovascular:  Negative for chest pain and leg swelling.   Gastrointestinal:  Negative for abdominal pain, constipation, nausea and vomiting.   Genitourinary:  Negative for dysuria, frequency and urgency.   Musculoskeletal:  Negative for arthralgias.   Skin:  Negative for rash.   Neurological:  Negative for dizziness, weakness and headaches.   Psychiatric/Behavioral:  Negative for sleep disturbance.           Objective:      Vitals:    08/01/23 1153   BP: 138/88   Pulse: 64   SpO2: 97%   Weight: 135.2 kg (298 lb)   Height: 5' 7" (1.702 m)     Physical Exam  Vitals and nursing note reviewed.   Constitutional:       General: He is not in acute distress.     Appearance: Normal appearance. He is well-developed.   HENT:      Head: Normocephalic and atraumatic.      Right Ear: External ear normal.      Left Ear: External ear normal.   Eyes:      Pupils: Pupils are equal, round, and reactive to light.   Neck:      Trachea: No tracheal deviation.   Cardiovascular:      Rate and Rhythm: Normal rate and regular rhythm.      Heart sounds: No murmur heard.     No friction rub. No gallop.   Pulmonary:      Breath sounds: Normal breath sounds. No stridor. No wheezing or rales.   Abdominal:      Palpations: Abdomen is soft. There is no mass.      Tenderness: There is no abdominal tenderness.   Musculoskeletal:         General: No tenderness or deformity.      Cervical back: Neck supple.   Lymphadenopathy:      Cervical: No cervical " adenopathy.   Skin:     General: Skin is warm and dry.   Neurological:      Mental Status: He is alert and oriented to person, place, and time.      Coordination: Coordination normal.   Psychiatric:         Mood and Affect: Mood is depressed.         Thought Content: Thought content normal.           Assessment:       1. Decreased cardiac ejection fraction    2. High risk medication use    3. Hypertension, unspecified type    4. Depression, unspecified depression type    5. Combined systolic and diastolic congestive heart failure, unspecified HF chronicity         Plan:       Decreased cardiac ejection fraction  Comments:  improved to 32%  Orders:  -     Comprehensive Metabolic Panel; Future; Expected date: 08/01/2023  -     Lipid Panel; Future; Expected date: 08/01/2023  -     TSH w/reflex to FT4; Future; Expected date: 08/01/2023    High risk medication use  -     Comprehensive Metabolic Panel; Future; Expected date: 08/01/2023  -     Lipid Panel; Future; Expected date: 08/01/2023  -     TSH w/reflex to FT4; Future; Expected date: 08/01/2023    Hypertension, unspecified type  Comments:  bp is well controlled  Orders:  -     Comprehensive Metabolic Panel; Future; Expected date: 08/01/2023  -     Lipid Panel; Future; Expected date: 08/01/2023  -     TSH w/reflex to FT4; Future; Expected date: 08/01/2023    Depression, unspecified depression type  Comments:  start lexapro. call with any issues  Orders:  -     EScitalopram oxalate (LEXAPRO) 10 MG tablet; Take 1 tablet (10 mg total) by mouth once daily.  Dispense: 30 tablet; Refill: 11    Combined systolic and diastolic congestive heart failure, unspecified HF chronicity  Comments:  see dr. garrido      Follow up in about 4 weeks (around 8/29/2023), or if symptoms worsen or fail to improve, for medication management.        8/13/2023 Aida Snow

## 2023-08-17 RX ORDER — SACUBITRIL AND VALSARTAN 24; 26 MG/1; MG/1
1 TABLET, FILM COATED ORAL 2 TIMES DAILY
Qty: 180 TABLET | Refills: 3 | Status: SHIPPED | OUTPATIENT
Start: 2023-08-17 | End: 2024-01-02 | Stop reason: SDUPTHER

## 2023-08-17 RX ORDER — METOPROLOL TARTRATE 25 MG/1
25 TABLET, FILM COATED ORAL 2 TIMES DAILY
Qty: 60 TABLET | Refills: 2 | Status: SHIPPED | OUTPATIENT
Start: 2023-08-17 | End: 2023-09-27 | Stop reason: SDUPTHER

## 2023-08-17 NOTE — TELEPHONE ENCOUNTER
----- Message from Beti Rose sent at 8/17/2023 11:06 AM CDT -----  Patient called and stated that he need a refill of his sacubitriL-valsartan and his metoprolol tartrate called into [facility] pharmacy at [facility]. If any questions please give him a call at [phone]

## 2023-08-29 ENCOUNTER — OFFICE VISIT (OUTPATIENT)
Dept: FAMILY MEDICINE | Facility: CLINIC | Age: 28
End: 2023-08-29
Payer: COMMERCIAL

## 2023-08-29 DIAGNOSIS — I50.40 COMBINED SYSTOLIC AND DIASTOLIC CONGESTIVE HEART FAILURE, UNSPECIFIED HF CHRONICITY: ICD-10-CM

## 2023-08-29 DIAGNOSIS — Z79.899 HIGH RISK MEDICATION USE: ICD-10-CM

## 2023-08-29 DIAGNOSIS — F32.A DEPRESSION, UNSPECIFIED DEPRESSION TYPE: Primary | ICD-10-CM

## 2023-08-29 DIAGNOSIS — I10 PRIMARY HYPERTENSION: ICD-10-CM

## 2023-08-29 PROCEDURE — 1160F PR REVIEW ALL MEDS BY PRESCRIBER/CLIN PHARMACIST DOCUMENTED: ICD-10-PCS | Mod: CPTII,S$GLB,, | Performed by: NURSE PRACTITIONER

## 2023-08-29 PROCEDURE — 1159F MED LIST DOCD IN RCRD: CPT | Mod: CPTII,S$GLB,, | Performed by: NURSE PRACTITIONER

## 2023-08-29 PROCEDURE — 3078F DIAST BP <80 MM HG: CPT | Mod: CPTII,S$GLB,, | Performed by: NURSE PRACTITIONER

## 2023-08-29 PROCEDURE — 4010F PR ACE/ARB THEARPY RXD/TAKEN: ICD-10-PCS | Mod: CPTII,S$GLB,, | Performed by: NURSE PRACTITIONER

## 2023-08-29 PROCEDURE — 3075F SYST BP GE 130 - 139MM HG: CPT | Mod: CPTII,S$GLB,, | Performed by: NURSE PRACTITIONER

## 2023-08-29 PROCEDURE — 1160F RVW MEDS BY RX/DR IN RCRD: CPT | Mod: CPTII,S$GLB,, | Performed by: NURSE PRACTITIONER

## 2023-08-29 PROCEDURE — 3078F PR MOST RECENT DIASTOLIC BLOOD PRESSURE < 80 MM HG: ICD-10-PCS | Mod: CPTII,S$GLB,, | Performed by: NURSE PRACTITIONER

## 2023-08-29 PROCEDURE — 99214 OFFICE O/P EST MOD 30 MIN: CPT | Mod: S$GLB,,, | Performed by: NURSE PRACTITIONER

## 2023-08-29 PROCEDURE — 99214 PR OFFICE/OUTPT VISIT, EST, LEVL IV, 30-39 MIN: ICD-10-PCS | Mod: S$GLB,,, | Performed by: NURSE PRACTITIONER

## 2023-08-29 PROCEDURE — 1159F PR MEDICATION LIST DOCUMENTED IN MEDICAL RECORD: ICD-10-PCS | Mod: CPTII,S$GLB,, | Performed by: NURSE PRACTITIONER

## 2023-08-29 PROCEDURE — 4010F ACE/ARB THERAPY RXD/TAKEN: CPT | Mod: CPTII,S$GLB,, | Performed by: NURSE PRACTITIONER

## 2023-08-29 PROCEDURE — 3044F PR MOST RECENT HEMOGLOBIN A1C LEVEL <7.0%: ICD-10-PCS | Mod: CPTII,S$GLB,, | Performed by: NURSE PRACTITIONER

## 2023-08-29 PROCEDURE — 3008F BODY MASS INDEX DOCD: CPT | Mod: CPTII,S$GLB,, | Performed by: NURSE PRACTITIONER

## 2023-08-29 PROCEDURE — 3008F PR BODY MASS INDEX (BMI) DOCUMENTED: ICD-10-PCS | Mod: CPTII,S$GLB,, | Performed by: NURSE PRACTITIONER

## 2023-08-29 PROCEDURE — 3075F PR MOST RECENT SYSTOLIC BLOOD PRESS GE 130-139MM HG: ICD-10-PCS | Mod: CPTII,S$GLB,, | Performed by: NURSE PRACTITIONER

## 2023-08-29 PROCEDURE — 3044F HG A1C LEVEL LT 7.0%: CPT | Mod: CPTII,S$GLB,, | Performed by: NURSE PRACTITIONER

## 2023-08-29 RX ORDER — SERTRALINE HYDROCHLORIDE 50 MG/1
50 TABLET, FILM COATED ORAL DAILY
Qty: 30 TABLET | Refills: 11 | Status: SHIPPED | OUTPATIENT
Start: 2023-08-29 | End: 2024-08-28

## 2023-08-29 NOTE — PROGRESS NOTES
SUBJECTIVE:    Patient ID: Wenceslao Jeong is a 28 y.o. male.    Chief Complaint: Follow-up (No bottles, 4 week follow up, No complaints//BA )     28 year old male presents for follow up. Patient is being treated for htn, chf. Being followed by dr. Marcus. Had echo performed in June. Ejection fraction improved to 32%. Was seen in office about 1 month ago. Was complaining of feeling down lately. Does not want to do much. No suicidal/homicidal ideations. Started on lexapro. Did not like the way he felt. Feels like it added to anxiety. Felt irritable. Quit it after about 3 weeks. Not feeling much better. Lives with brother      Office Visit on 08/01/2023   Component Date Value Ref Range Status    Glucose 08/01/2023 98  65 - 139 mg/dL Final    BUN 08/01/2023 12  7 - 25 mg/dL Final    Creatinine 08/01/2023 1.04  0.60 - 1.24 mg/dL Final    eGFR 08/01/2023 100  > OR = 60 mL/min/1.73m2 Final    BUN/Creatinine Ratio 08/01/2023 SEE NOTE:  6 - 22 (calc) Final    Sodium 08/01/2023 139  135 - 146 mmol/L Final    Potassium 08/01/2023 3.9  3.5 - 5.3 mmol/L Final    Chloride 08/01/2023 103  98 - 110 mmol/L Final    CO2 08/01/2023 23  20 - 32 mmol/L Final    Calcium 08/01/2023 9.2  8.6 - 10.3 mg/dL Final    Total Protein 08/01/2023 7.1  6.1 - 8.1 g/dL Final    Albumin 08/01/2023 4.1  3.6 - 5.1 g/dL Final    Globulin, Total 08/01/2023 3.0  1.9 - 3.7 g/dL (calc) Final    Albumin/Globulin Ratio 08/01/2023 1.4  1.0 - 2.5 (calc) Final    Total Bilirubin 08/01/2023 0.3  0.2 - 1.2 mg/dL Final    Alkaline Phosphatase 08/01/2023 43  36 - 130 U/L Final    AST 08/01/2023 19  10 - 40 U/L Final    ALT 08/01/2023 16  9 - 46 U/L Final    Cholesterol 08/01/2023 185  <200 mg/dL Final    HDL 08/01/2023 70  > OR = 40 mg/dL Final    Triglycerides 08/01/2023 259 (H)  <150 mg/dL Final    LDL Cholesterol 08/01/2023 81  mg/dL (calc) Final    HDL/Cholesterol Ratio 08/01/2023 2.6  <5.0 (calc) Final    Non HDL Chol. (LDL+VLDL)  08/01/2023 115  <130 mg/dL (calc) Final    TSH w/reflex to FT4 08/01/2023 1.39  0.40 - 4.50 mIU/L Final   Hospital Outpatient Visit on 06/09/2023   Component Date Value Ref Range Status    BSA 06/09/2023 2.46  m2 Final    TDI SEPTAL 06/09/2023 0.07  m/s Final    LV LATERAL E/E' RATIO 06/09/2023 9.89  m/s Final    LV SEPTAL E/E' RATIO 06/09/2023 12.71  m/s Final    Left Ventricular Outflow Tract Leonela* 06/09/2023 0.70  cm/s Final    Left Ventricular Outflow Tract Leonela* 06/09/2023 2.00  mmHg Final    AORTIC VALVE CUSP SEPERATION 06/09/2023 1.90  cm Final    TDI LATERAL 06/09/2023 0.09  m/s Final    LVIDd 06/09/2023 6.82 (A)  3.5 - 6.0 cm Final    IVS 06/09/2023 1.03  0.6 - 1.1 cm Final    Posterior Wall 06/09/2023 1.10  0.6 - 1.1 cm Final    Ao root annulus 06/09/2023 3.10  cm Final    LVIDs 06/09/2023 5.77 (A)  2.1 - 4.0 cm Final    FS 06/09/2023 15  28 - 44 % Final    LV mass 06/09/2023 333.15  g Final    RVDD 06/09/2023 2.69  cm Final    Left Ventricle Relative Wall Thick* 06/09/2023 0.32  cm Final    AV mean gradient 06/09/2023 4  mmHg Final    AV valve area 06/09/2023 3.27  cm2 Final    AV Velocity Ratio 06/09/2023 0.88   Final    AV index (prosthetic) 06/09/2023 0.95   Final    MV valve area p 1/2 method 06/09/2023 5.64  cm2 Final    E/A ratio 06/09/2023 2.70   Final    Mean e' 06/09/2023 0.08  m/s Final    E wave deceleration time 06/09/2023 232.00  msec Final    IVRT 06/09/2023 165.00  msec Final    LVOT diameter 06/09/2023 2.10  cm Final    LVOT area 06/09/2023 3.5  cm2 Final    LVOT peak mino 06/09/2023 1.12  m/s Final    LVOT peak VTI 06/09/2023 24.50  cm Final    Ao peak mino 06/09/2023 1.28  m/s Final    Ao VTI 06/09/2023 25.9  cm Final    LVOT stroke volume 06/09/2023 84.82  cm3 Final    AV peak gradient 06/09/2023 7  mmHg Final    E/E' ratio 06/09/2023 11.13  m/s Final    MV Peak E Mino 06/09/2023 0.89  m/s Final    TR Max Mino 06/09/2023 2.42  m/s Final    MV stenosis  pressure 1/2 time 06/09/2023 39.00  ms Final    MV Peak A Henry 06/09/2023 0.33  m/s Final    LV Systolic Volume 06/09/2023 165.00  mL Final    LV Systolic Volume Index 06/09/2023 70.5  mL/m2 Final    LV Diastolic Volume 06/09/2023 241.00  mL Final    LV Diastolic Volume Index 06/09/2023 102.99  mL/m2 Final    LV Mass Index 06/09/2023 142  g/m2 Final    Triscuspid Valve Regurgitation Pea* 06/09/2023 23  mmHg Final    Oglesby's Biplane MOD Ejection Fra* 06/09/2023 3  % Final    Right Atrial Pressure (from IVC) 06/09/2023 3  mmHg Final    EF 06/09/2023 32  % Final    TV resting pulmonary artery pressu* 06/09/2023 26  mmHg Final   No results displayed because visit has over 200 results.          Past Medical History:   Diagnosis Date    CHF (congestive heart failure)     Hypertension      Social History     Socioeconomic History    Marital status: Single   Tobacco Use    Smoking status: Never    Smokeless tobacco: Never   Substance and Sexual Activity    Alcohol use: Not Currently    Drug use: Not Currently     Past Surgical History:   Procedure Laterality Date    LEFT HEART CATHETERIZATION Left 3/14/2023    Procedure: Left heart cath;  Surgeon: Joey Sousa MD;  Location: [facility] CATH/EP LAB;  Service: Cardiology;  Laterality: Left;     Family History   Problem Relation Age of Onset    Diabetes Mother     Heart disease Mother     Cancer Father     Drug abuse Maternal Aunt     Heart disease Maternal Grandmother     Drug abuse Maternal Grandfather     Hypertension Maternal Grandfather        Tests to Keep You Healthy    Last Blood Pressure <= 139/89 (8/29/2023): NO      Review of patient's allergies indicates:  No Known Allergies    Current Outpatient Medications:     isosorbide mononitrate (IMDUR) 30 MG 24 hr tablet, Take 1 tablet (30 mg total) by mouth once daily., Disp: 90 tablet, Rfl: 3    metoprolol tartrate (LOPRESSOR) 25 MG tablet, Take 1 tablet (25 mg total) by mouth 2 (two) times  "daily., Disp: 60 tablet, Rfl: 2    spironolactone (ALDACTONE) 25 MG tablet, Take 1 tablet (25 mg total) by mouth once daily., Disp: 90 tablet, Rfl: 3    sacubitriL-valsartan (ENTRESTO) 24-26 mg per tablet, Take 1 tablet by mouth 2 (two) times daily., Disp: 180 tablet, Rfl: 3    sertraline (ZOLOFT) 50 MG tablet, Take 1 tablet (50 mg total) by mouth once daily., Disp: 30 tablet, Rfl: 11    Review of Systems   Constitutional:  Negative for activity change, fatigue, fever and unexpected weight change.   HENT:  Negative for ear pain, hearing loss, rhinorrhea, sinus pressure, sore throat and trouble swallowing.    Eyes:  Negative for pain, discharge and visual disturbance.   Respiratory:  Negative for cough, chest tightness, shortness of breath and wheezing.    Cardiovascular:  Negative for chest pain, palpitations and leg swelling.   Gastrointestinal:  Negative for abdominal pain, blood in stool, constipation, diarrhea, nausea and vomiting.   Endocrine: Negative for polydipsia and polyuria.   Genitourinary:  Negative for difficulty urinating, dysuria, frequency, hematuria and urgency.   Musculoskeletal:  Negative for arthralgias, joint swelling and neck pain.   Skin:  Negative for rash.   Neurological:  Negative for dizziness, weakness and headaches.   Psychiatric/Behavioral:  Negative for confusion, dysphoric mood and sleep disturbance.           Objective:      Vitals:    08/29/23 1020 08/29/23 1025 08/29/23 1053   BP: (!) 146/88 (!) 142/86 132/78   Pulse: 78     SpO2: 99%     Weight: 133.8 kg (295 lb)     Height: 5' 7" (1.702 m)       Physical Exam  Constitutional:       General: He is not in acute distress.     Appearance: Normal appearance. He is well-developed. He is obese. He is not ill-appearing.   Eyes:      Pupils: Pupils are equal, round, and reactive to light.   Neck:      Trachea: No tracheal deviation.   Cardiovascular:      Rate and Rhythm: Normal rate and regular rhythm.      Heart sounds: No murmur " heard.     No friction rub. No gallop.   Pulmonary:      Breath sounds: Normal breath sounds. No stridor. No wheezing or rales.   Abdominal:      Palpations: Abdomen is soft. There is no mass.      Tenderness: There is no abdominal tenderness.   Musculoskeletal:         General: No tenderness or deformity.      Cervical back: Neck supple.   Lymphadenopathy:      Cervical: No cervical adenopathy.   Skin:     General: Skin is warm and dry.   Neurological:      Mental Status: He is alert and oriented to person, place, and time.      Coordination: Coordination normal.   Psychiatric:         Mood and Affect: Mood is depressed.         Thought Content: Thought content normal.         Assessment:       1. Depression, unspecified depression type    2. High risk medication use    3. Combined systolic and diastolic congestive heart failure, unspecified HF chronicity    4. Primary hypertension         Plan:       Depression, unspecified depression type  Comments:  try zoloft  Orders:  -     sertraline (ZOLOFT) 50 MG tablet; Take 1 tablet (50 mg total) by mouth once daily.  Dispense: 30 tablet; Refill: 11    High risk medication use  -     sertraline (ZOLOFT) 50 MG tablet; Take 1 tablet (50 mg total) by mouth once daily.  Dispense: 30 tablet; Refill: 11    Combined systolic and diastolic congestive heart failure, unspecified HF chronicity  Comments:  keep appointment with dr. garrido as scheduled.     Primary hypertension      Follow up in about 2 weeks (around 9/12/2023), or if symptoms worsen or fail to improve, for medication management.        8/31/2023 Aida Snow

## 2023-08-31 VITALS
HEIGHT: 67 IN | BODY MASS INDEX: 46.3 KG/M2 | OXYGEN SATURATION: 99 % | HEART RATE: 78 BPM | DIASTOLIC BLOOD PRESSURE: 78 MMHG | SYSTOLIC BLOOD PRESSURE: 132 MMHG | WEIGHT: 295 LBS

## 2023-09-12 ENCOUNTER — OFFICE VISIT (OUTPATIENT)
Dept: FAMILY MEDICINE | Facility: CLINIC | Age: 28
End: 2023-09-12
Payer: COMMERCIAL

## 2023-09-12 DIAGNOSIS — F32.A DEPRESSION, UNSPECIFIED DEPRESSION TYPE: ICD-10-CM

## 2023-09-12 DIAGNOSIS — I50.42 CHRONIC COMBINED SYSTOLIC AND DIASTOLIC CONGESTIVE HEART FAILURE: ICD-10-CM

## 2023-09-12 DIAGNOSIS — I10 PRIMARY HYPERTENSION: Primary | ICD-10-CM

## 2023-09-12 PROCEDURE — 99213 PR OFFICE/OUTPT VISIT, EST, LEVL III, 20-29 MIN: ICD-10-PCS | Mod: 95,,, | Performed by: NURSE PRACTITIONER

## 2023-09-12 PROCEDURE — 4010F ACE/ARB THERAPY RXD/TAKEN: CPT | Mod: CPTII,95,, | Performed by: NURSE PRACTITIONER

## 2023-09-12 PROCEDURE — 1160F RVW MEDS BY RX/DR IN RCRD: CPT | Mod: CPTII,95,, | Performed by: NURSE PRACTITIONER

## 2023-09-12 PROCEDURE — 1159F MED LIST DOCD IN RCRD: CPT | Mod: CPTII,95,, | Performed by: NURSE PRACTITIONER

## 2023-09-12 PROCEDURE — 99213 OFFICE O/P EST LOW 20 MIN: CPT | Mod: 95,,, | Performed by: NURSE PRACTITIONER

## 2023-09-12 PROCEDURE — 4010F PR ACE/ARB THEARPY RXD/TAKEN: ICD-10-PCS | Mod: CPTII,95,, | Performed by: NURSE PRACTITIONER

## 2023-09-12 PROCEDURE — 3044F HG A1C LEVEL LT 7.0%: CPT | Mod: CPTII,95,, | Performed by: NURSE PRACTITIONER

## 2023-09-12 PROCEDURE — 1159F PR MEDICATION LIST DOCUMENTED IN MEDICAL RECORD: ICD-10-PCS | Mod: CPTII,95,, | Performed by: NURSE PRACTITIONER

## 2023-09-12 PROCEDURE — 3044F PR MOST RECENT HEMOGLOBIN A1C LEVEL <7.0%: ICD-10-PCS | Mod: CPTII,95,, | Performed by: NURSE PRACTITIONER

## 2023-09-12 PROCEDURE — 1160F PR REVIEW ALL MEDS BY PRESCRIBER/CLIN PHARMACIST DOCUMENTED: ICD-10-PCS | Mod: CPTII,95,, | Performed by: NURSE PRACTITIONER

## 2023-09-12 NOTE — PROGRESS NOTES
Subjective:        The chief complaint leading to consultation is: follow up  The patient location is:  Work  Visit type: Virtual visit with synchronous audio/video or audio only  This was a video visit in lieu of in-person visit due to the coronavirus emergency. Patient acknowledged and consented to the video visit encounter.      28 year old male presents for virtual follow up.. Patient is being treated for htn, chf. At last visit was started on zoloft. Was feeling down lately. Since starting feels better. Less stress. No suicidal/homicidal ideations.  Lives with brother        Past Surgical History:   Procedure Laterality Date    LEFT HEART CATHETERIZATION Left 3/14/2023    Procedure: Left heart cath;  Surgeon: Joey Sousa MD;  Location: [facility] CATH/EP LAB;  Service: Cardiology;  Laterality: Left;     Past Medical History:   Diagnosis Date    CHF (congestive heart failure)     Hypertension      Family History   Problem Relation Age of Onset    Diabetes Mother     Heart disease Mother     Cancer Father     Drug abuse Maternal Aunt     Heart disease Maternal Grandmother     Drug abuse Maternal Grandfather     Hypertension Maternal Grandfather         Social History:   Marital Status: Single  Alcohol History:  reports that he does not currently use alcohol.  Tobacco History:  reports that he has never smoked. He has never used smokeless tobacco.  Drug History:  reports that he does not currently use drugs.    Review of patient's allergies indicates:  No Known Allergies    Current Outpatient Medications   Medication Sig Dispense Refill    isosorbide mononitrate (IMDUR) 30 MG 24 hr tablet Take 1 tablet (30 mg total) by mouth once daily. 90 tablet 3    metoprolol tartrate (LOPRESSOR) 25 MG tablet Take 1 tablet (25 mg total) by mouth 2 (two) times daily. 60 tablet 2    sacubitriL-valsartan (ENTRESTO) 24-26 mg per tablet Take 1 tablet by mouth 2 (two) times daily. 180 tablet 3    sertraline (ZOLOFT) 50 MG  tablet Take 1 tablet (50 mg total) by mouth once daily. 30 tablet 11    spironolactone (ALDACTONE) 25 MG tablet Take 1 tablet (25 mg total) by mouth once daily. 90 tablet 3     No current facility-administered medications for this visit.       Review of Systems   Constitutional:  Negative for activity change and unexpected weight change.   HENT:  Negative for hearing loss, rhinorrhea and trouble swallowing.    Eyes:  Negative for discharge and visual disturbance.   Respiratory:  Negative for chest tightness and wheezing.    Cardiovascular:  Negative for chest pain and palpitations.   Gastrointestinal:  Negative for blood in stool, constipation, diarrhea and vomiting.   Endocrine: Negative for polydipsia and polyuria.   Genitourinary:  Negative for difficulty urinating, hematuria and urgency.   Musculoskeletal:  Negative for arthralgias, joint swelling and neck pain.   Neurological:  Negative for weakness and headaches.   Psychiatric/Behavioral:  Negative for confusion and dysphoric mood.          Objective:        Physical Exam:   Physical Exam  Vitals and nursing note reviewed.   Constitutional:       General: He is not in acute distress.     Appearance: Normal appearance.   Neurological:      Mental Status: He is alert.              Assessment:       1. Primary hypertension    2. Depression, unspecified depression type    3. Chronic combined systolic and diastolic congestive heart failure      Plan:   Primary hypertension    Depression, unspecified depression type  Comments:  continue zoloft    Chronic combined systolic and diastolic congestive heart failure  Comments:  managed by dr. garrido      Follow up in about 6 weeks (around 10/24/2023), or if symptoms worsen or fail to improve, for medication management.    Total time spent with patient: 20    Each patient to whom he or she provides medical services by telemedicine is:  (1) informed of the relationship between the physician and patient and the respective  role of any other health care provider with respect to management of the patient; and (2) notified that he or she may decline to receive medical services by telemedicine and may withdraw from such care at any time.         Other specify/Dysuria

## 2023-09-27 RX ORDER — METOPROLOL TARTRATE 25 MG/1
25 TABLET, FILM COATED ORAL 2 TIMES DAILY
Qty: 60 TABLET | Refills: 2 | Status: SHIPPED | OUTPATIENT
Start: 2023-09-27 | End: 2024-09-26

## 2024-01-02 RX ORDER — SACUBITRIL AND VALSARTAN 24; 26 MG/1; MG/1
1 TABLET, FILM COATED ORAL 2 TIMES DAILY
Qty: 180 TABLET | Refills: 3 | Status: CANCELLED | OUTPATIENT
Start: 2024-01-02 | End: 2025-01-01

## 2024-01-02 RX ORDER — SACUBITRIL AND VALSARTAN 24; 26 MG/1; MG/1
1 TABLET, FILM COATED ORAL 2 TIMES DAILY
Qty: 180 TABLET | Refills: 0 | Status: SHIPPED | OUTPATIENT
Start: 2024-01-02 | End: 2025-01-01

## 2024-03-06 NOTE — TELEPHONE ENCOUNTER
Anesthesia Post Evaluation    Patient: Nadia Irene    Procedure(s) Performed: Procedure(s) (LRB):  ENTEROSCOPY (N/A)    Final Anesthesia Type: general      Patient location during evaluation: GI PACU  Patient participation: Yes- Able to Participate  Level of consciousness: awake and alert, oriented and awake  Post-procedure vital signs: reviewed and stable  Pain management: adequate  Airway patency: patent    PONV status at discharge: No PONV  Anesthetic complications: no      Cardiovascular status: blood pressure returned to baseline, hemodynamically stable and stable  Respiratory status: unassisted, spontaneous ventilation and nasal cannula  Hydration status: euvolemic  Follow-up not needed.              Vitals Value Taken Time   /64 03/06/24 1410   Temp 36.7 °C (98.1 °F) 03/06/24 1355   Pulse 85 03/06/24 1414   Resp 22 03/06/24 1414   SpO2 100 % 03/06/24 1414   Vitals shown include unvalidated device data.      No case tracking events are documented in the log.      Pain/Leonardo Score: Pain Rating Prior to Med Admin: 7 (3/5/2024 10:45 PM)           Spoke with pt, appointment made.

## 2024-04-26 RX ORDER — METOPROLOL TARTRATE 25 MG/1
25 TABLET, FILM COATED ORAL 2 TIMES DAILY
Qty: 60 TABLET | Refills: 2 | Status: CANCELLED | OUTPATIENT
Start: 2024-04-26 | End: 2025-04-26

## 2024-06-07 RX ORDER — METOPROLOL TARTRATE 25 MG/1
25 TABLET, FILM COATED ORAL 2 TIMES DAILY
Qty: 60 TABLET | Refills: 2 | OUTPATIENT
Start: 2024-06-07 | End: 2025-06-07

## 2025-03-11 ENCOUNTER — HOSPITAL ENCOUNTER (EMERGENCY)
Facility: HOSPITAL | Age: 30
Discharge: HOME | End: 2025-03-11
Attending: STUDENT IN AN ORGANIZED HEALTH CARE EDUCATION/TRAINING PROGRAM
Payer: COMMERCIAL

## 2025-03-11 VITALS
WEIGHT: 299 LBS | OXYGEN SATURATION: 97 % | HEART RATE: 79 BPM | RESPIRATION RATE: 18 BRPM | DIASTOLIC BLOOD PRESSURE: 94 MMHG | TEMPERATURE: 99 F | SYSTOLIC BLOOD PRESSURE: 186 MMHG | BODY MASS INDEX: 45.31 KG/M2 | HEIGHT: 68 IN

## 2025-03-11 DIAGNOSIS — Z86.79 HISTORY OF HEART FAILURE: ICD-10-CM

## 2025-03-11 DIAGNOSIS — R07.9 CHEST PAIN: ICD-10-CM

## 2025-03-11 DIAGNOSIS — R06.02 SOB (SHORTNESS OF BREATH): Primary | ICD-10-CM

## 2025-03-11 LAB
ALBUMIN SERPL BCP-MCNC: 4.2 G/DL (ref 3.5–5.2)
ALP SERPL-CCNC: 66 U/L (ref 55–135)
ALT SERPL W/O P-5'-P-CCNC: 22 U/L (ref 10–44)
ANION GAP SERPL CALC-SCNC: 10 MMOL/L (ref 8–16)
AST SERPL-CCNC: 24 U/L (ref 10–40)
BASOPHILS # BLD AUTO: 0.05 K/UL (ref 0–0.2)
BASOPHILS NFR BLD: 0.4 % (ref 0–1.9)
BILIRUB SERPL-MCNC: 0.8 MG/DL (ref 0.1–1)
BNP SERPL-MCNC: 323 PG/ML (ref 0–99)
BUN SERPL-MCNC: 16 MG/DL (ref 6–20)
CALCIUM SERPL-MCNC: 9.2 MG/DL (ref 8.7–10.5)
CHLORIDE SERPL-SCNC: 103 MMOL/L (ref 95–110)
CO2 SERPL-SCNC: 29 MMOL/L (ref 23–29)
CREAT SERPL-MCNC: 1.3 MG/DL (ref 0.5–1.4)
D DIMER PPP IA.FEU-MCNC: 0.28 MG/L FEU (ref 0–0.49)
DIFFERENTIAL METHOD BLD: ABNORMAL
EOSINOPHIL # BLD AUTO: 0.1 K/UL (ref 0–0.5)
EOSINOPHIL NFR BLD: 0.5 % (ref 0–8)
ERYTHROCYTE [DISTWIDTH] IN BLOOD BY AUTOMATED COUNT: 14.6 % (ref 11.5–14.5)
EST. GFR  (NO RACE VARIABLE): >60 ML/MIN/1.73 M^2
GLUCOSE SERPL-MCNC: 109 MG/DL (ref 70–110)
HCT VFR BLD AUTO: 40.9 % (ref 40–54)
HGB BLD-MCNC: 14.2 G/DL (ref 14–18)
IMM GRANULOCYTES # BLD AUTO: 0.04 K/UL (ref 0–0.04)
IMM GRANULOCYTES NFR BLD AUTO: 0.3 % (ref 0–0.5)
LYMPHOCYTES # BLD AUTO: 3.9 K/UL (ref 1–4.8)
LYMPHOCYTES NFR BLD: 33.8 % (ref 18–48)
MCH RBC QN AUTO: 27 PG (ref 27–31)
MCHC RBC AUTO-ENTMCNC: 34.7 G/DL (ref 32–36)
MCV RBC AUTO: 78 FL (ref 82–98)
MONOCYTES # BLD AUTO: 0.8 K/UL (ref 0.3–1)
MONOCYTES NFR BLD: 7.1 % (ref 4–15)
NEUTROPHILS # BLD AUTO: 6.6 K/UL (ref 1.8–7.7)
NEUTROPHILS NFR BLD: 57.9 % (ref 38–73)
NRBC BLD-RTO: 0 /100 WBC
PLATELET # BLD AUTO: 227 K/UL (ref 150–450)
PMV BLD AUTO: 12.4 FL (ref 9.2–12.9)
POTASSIUM SERPL-SCNC: 3.4 MMOL/L (ref 3.5–5.1)
PROT SERPL-MCNC: 7.5 G/DL (ref 6–8.4)
RBC # BLD AUTO: 5.25 M/UL (ref 4.6–6.2)
SODIUM SERPL-SCNC: 142 MMOL/L (ref 136–145)
TROPONIN I SERPL HS-MCNC: 27.2 PG/ML (ref 0–14.9)
WBC # BLD AUTO: 11.5 K/UL (ref 3.9–12.7)

## 2025-03-11 PROCEDURE — 80053 COMPREHEN METABOLIC PANEL: CPT | Performed by: NURSE PRACTITIONER

## 2025-03-11 PROCEDURE — 87389 HIV-1 AG W/HIV-1&-2 AB AG IA: CPT | Performed by: EMERGENCY MEDICINE

## 2025-03-11 PROCEDURE — 93010 ELECTROCARDIOGRAM REPORT: CPT | Mod: ,,, | Performed by: INTERNAL MEDICINE

## 2025-03-11 PROCEDURE — 36415 COLL VENOUS BLD VENIPUNCTURE: CPT | Performed by: STUDENT IN AN ORGANIZED HEALTH CARE EDUCATION/TRAINING PROGRAM

## 2025-03-11 PROCEDURE — 85379 FIBRIN DEGRADATION QUANT: CPT | Performed by: STUDENT IN AN ORGANIZED HEALTH CARE EDUCATION/TRAINING PROGRAM

## 2025-03-11 PROCEDURE — 86803 HEPATITIS C AB TEST: CPT | Performed by: EMERGENCY MEDICINE

## 2025-03-11 PROCEDURE — 99285 EMERGENCY DEPT VISIT HI MDM: CPT | Mod: 25

## 2025-03-11 PROCEDURE — 83880 ASSAY OF NATRIURETIC PEPTIDE: CPT | Performed by: NURSE PRACTITIONER

## 2025-03-11 PROCEDURE — 93005 ELECTROCARDIOGRAM TRACING: CPT | Performed by: INTERNAL MEDICINE

## 2025-03-11 PROCEDURE — 85025 COMPLETE CBC W/AUTO DIFF WBC: CPT | Performed by: NURSE PRACTITIONER

## 2025-03-11 PROCEDURE — 84484 ASSAY OF TROPONIN QUANT: CPT | Performed by: NURSE PRACTITIONER

## 2025-03-11 RX ORDER — METOPROLOL TARTRATE 25 MG/1
25 TABLET, FILM COATED ORAL 2 TIMES DAILY
Qty: 60 TABLET | Refills: 0 | Status: SHIPPED | OUTPATIENT
Start: 2025-03-11 | End: 2025-03-26

## 2025-03-11 RX ORDER — SPIRONOLACTONE 25 MG/1
25 TABLET ORAL DAILY
Qty: 30 TABLET | Refills: 0 | Status: SHIPPED | OUTPATIENT
Start: 2025-03-11 | End: 2025-03-26

## 2025-03-11 RX ORDER — ISOSORBIDE MONONITRATE 30 MG/1
30 TABLET, EXTENDED RELEASE ORAL DAILY
Qty: 30 TABLET | Refills: 0 | Status: SHIPPED | OUTPATIENT
Start: 2025-03-11 | End: 2025-03-26

## 2025-03-12 ENCOUNTER — TELEPHONE (OUTPATIENT)
Dept: FAMILY MEDICINE | Facility: CLINIC | Age: 30
End: 2025-03-12
Payer: COMMERCIAL

## 2025-03-12 ENCOUNTER — TELEPHONE (OUTPATIENT)
Dept: CARDIOLOGY | Facility: CLINIC | Age: 30
End: 2025-03-12
Payer: COMMERCIAL

## 2025-03-12 LAB
HCV AB SERPL QL IA: NEGATIVE
HIV 1+2 AB+HIV1 P24 AG SERPL QL IA: NEGATIVE
OHS QRS DURATION: 90 MS
OHS QTC CALCULATION: 495 MS

## 2025-03-12 NOTE — TELEPHONE ENCOUNTER
----- Message from Med Assistant Avilez sent at 3/12/2025 12:08 PM CDT -----  Pt was seen in the Er yesterday and needs a Hospital f/u apptPlease call him @ [phone] !Please do NOT respond directly back to me, any questions, reply to staff box since this inbox is not routinely monitored

## 2025-03-12 NOTE — TELEPHONE ENCOUNTER
----- Message from Med Assistant Fatmata sent at 3/12/2025  2:21 PM CDT -----  Pt was seen in the Er yesterday and needs a Hospital f/u apptPlease call him at [phone] !Please do NOT respond directly back to me, any questions, reply to staff box since this inbox is not routinely monitored

## 2025-03-12 NOTE — ED PROVIDER NOTES
Encounter Date: 3/11/2025       History     Chief Complaint   Patient presents with    Shortness of Breath     X3 weeks. Hx HF     HPI    Wenceslao Jeong is a 29 y.o. male with a past medical history of hypertension and heart failure that presents emergency department for evaluation of worsening shortness of breath that has been ongoing for a proximally 3 weeks.  Patient does have a history of heart failure and hypertension but he stopped taking his medications over a year ago because he could not afford them.  States that he has had progressively worsening shortness of breath over the past 3 weeks.  Not associated with orthopnea.  Minimally exertional.  Does endorse minor early exertional chest pain.  No chest pain currently.  Denies nonproductive cough.  Since he started feeling bad, he started to restrict his diet and decrease his sodium load.  States that his weight has decreased from 310 lb to 299.  Denies fevers, URI symptoms, nausea, vomiting, numbness, weakness, leg swelling, abdominal distention, and changes in bowel habits.    Review of patient's allergies indicates:  No Known Allergies  Past Medical History:   Diagnosis Date    CHF (congestive heart failure)     Hypertension      Past Surgical History:   Procedure Laterality Date    LEFT HEART CATHETERIZATION Left 3/14/2023    Procedure: Left heart cath;  Surgeon: Joey Sousa MD;  Location: [facility] CATH/EP LAB;  Service: Cardiology;  Laterality: Left;     Family History   Problem Relation Name Age of Onset    Diabetes Mother      Heart disease Mother      Cancer Father      Drug abuse Maternal Aunt      Heart disease Maternal Grandmother      Drug abuse Maternal Grandfather      Hypertension Maternal Grandfather       Social History[1]  Review of Systems   Constitutional:  Negative for fever.   HENT:  Negative for sore throat.    Respiratory:  Positive for shortness of breath. Negative for cough.    Cardiovascular:  Positive for chest pain. Negative  for leg swelling.   Gastrointestinal:  Negative for abdominal pain, diarrhea, nausea and vomiting.   Genitourinary:  Negative for dysuria, frequency and hematuria.   Musculoskeletal:  Negative for back pain and neck pain.   Skin:  Negative for rash.   Neurological:  Negative for dizziness, weakness, numbness and headaches.   Hematological:  Does not bruise/bleed easily.       Physical Exam     Initial Vitals [03/11/25 2000]   BP Pulse Resp Temp SpO2   (!) 188/114 108 18 98.9 °F (37.2 °C) 96 %      MAP       --         Physical Exam    Nursing note and vitals reviewed.  Constitutional: He appears well-developed and well-nourished.   HENT:   Head: Normocephalic and atraumatic.   Eyes: EOM are normal. Pupils are equal, round, and reactive to light.   Neck:   Normal range of motion.  Cardiovascular:  Normal rate, regular rhythm and normal heart sounds.           Pulmonary/Chest: Breath sounds normal. No respiratory distress. He has no wheezes. He has no rhonchi. He has no rales.   Abdominal: Abdomen is soft. He exhibits no distension. There is no abdominal tenderness. There is no rebound.   Musculoskeletal:         General: Normal range of motion.      Cervical back: Normal range of motion.     Neurological: He is alert and oriented to person, place, and time. He has normal strength. No cranial nerve deficit or sensory deficit. GCS score is 15. GCS eye subscore is 4. GCS verbal subscore is 5. GCS motor subscore is 6.   Skin: Capillary refill takes less than 2 seconds.   Psychiatric: He has a normal mood and affect.         ED Course   Procedures  Labs Reviewed   CBC W/ AUTO DIFFERENTIAL - Abnormal       Result Value    WBC 11.50      RBC 5.25      Hemoglobin 14.2      Hematocrit 40.9      MCV 78 (*)     MCH 27.0      MCHC 34.7      RDW 14.6 (*)     Platelets 227      MPV 12.4      Immature Granulocytes 0.3      Gran # (ANC) 6.6      Immature Grans (Abs) 0.04      Lymph # 3.9      Mono # 0.8      Eos # 0.1      Baso #  0.05      nRBC 0      Gran % 57.9      Lymph % 33.8      Mono % 7.1      Eosinophil % 0.5      Basophil % 0.4      Differential Method Automated     COMPREHENSIVE METABOLIC PANEL - Abnormal    Sodium 142      Potassium 3.4 (*)     Chloride 103      CO2 29      Glucose 109      BUN 16      Creatinine 1.3      Calcium 9.2      Total Protein 7.5      Albumin 4.2      Total Bilirubin 0.8      Alkaline Phosphatase 66      AST 24      ALT 22      eGFR >60.0      Anion Gap 10     B-TYPE NATRIURETIC PEPTIDE - Abnormal     (*)    TROPONIN I HIGH SENSITIVITY - Abnormal    Troponin I High Sensitivity 27.2 (*)    D DIMER, QUANTITATIVE   D DIMER, QUANTITATIVE    D-Dimer 0.28     HEPATITIS C ANTIBODY   HIV 1 / 2 ANTIBODY     EKG Readings: (Independently Interpreted)   Initial Reading: No STEMI. Rhythm: Sinus Tachycardia. Heart Rate: 109. Ectopy: No Ectopy. Conduction: Normal. ST Segments: Normal ST Segments. T Waves: Normal. Clinical Impression: Normal Sinus Rhythm       Imaging Results              X-Ray Chest PA And Lateral (Final result)  Result time 03/11/25 21:14:26      Final result by Elmo Cruz MD (03/11/25 21:14:26)                   Impression:      No acute findings      Electronically signed by: Elmo Cruz  Date:    03/11/2025  Time:    21:14               Narrative:    EXAMINATION:  XR CHEST PA AND LATERAL    CLINICAL HISTORY:  Chest pain, unspecified    TECHNIQUE:  PA and lateral views of the chest were performed.    COMPARISON:  03/07/2023    FINDINGS:  Lungs are clear. No focal consolidation. No pleural effusion. No pneumothorax. Normal heart size.                                       Medications - No data to display  Medical Decision Making  29-year-old male with a past medical history of CHF and hypertension that presents emergency department for shortness of breath that has been ongoing for 3 weeks.  Vitals were notable for 188/114 but otherwise stable.  Nontoxic male.  Lungs are  clear.  No evidence of volume overload.  Heart sounds within normal limits.  Currently chest pain-free.  Differential includes but isn't limited to heart failure, ACS, pneumonia, pneumothorax, symptomatic pleural effusion, electrolyte disturbance, symptomatic anemia, and pulmonary embolism.  EKG shows sinus tachycardia with no acute ST segment or T-wave changes.  CMP with minor hypokalemia.  CBC unremarkable.  BNP only mildly elevated, but near baseline.  Troponin of 27.2 which is near baseline.  Symptoms has been ongoing for a proximally 3 weeks, so does not need additional troponin.  D-dimer is negative.  Chest x-ray is showing no acute cardiopulmonary abnormality.  Heart score of 3.  Patient is overall well-appearing and stable for discharge.  We will re prescribe home blood pressure medications as well as spironolactone.  Referred to cardiology and family practice.    Risk  Prescription drug management.      Additional MDM:   Heart Score:    History:          Slightly suspicious.  ECG:             Nonspecific repolarisation disturbance  Age:               Less than 45 years  Risk factors: 1-2 risk factors  Troponin:       1-2x normal limit  Heart Score = 3                                       Clinical Impression:  Final diagnoses:  [R07.9] Chest pain  [R06.02] SOB (shortness of breath) (Primary)  [Z86.79] History of heart failure          ED Disposition Condition    Discharge Stable          ED Prescriptions       Medication Sig Dispense Start Date End Date Auth. Provider    isosorbide mononitrate (IMDUR) 30 MG 24 hr tablet Take 1 tablet (30 mg total) by mouth once daily. 30 tablet 3/11/2025 4/10/2025 Robert Hassan MD    metoprolol tartrate (LOPRESSOR) 25 MG tablet Take 1 tablet (25 mg total) by mouth 2 (two) times daily. 60 tablet 3/11/2025 4/10/2025 Robert Hassan MD    spironolactone (ALDACTONE) 25 MG tablet Take 1 tablet (25 mg total) by mouth once daily. 30 tablet 3/11/2025 4/10/2025  Robert Hassan MD          Follow-up Information       Follow up With Specialties Details Why Contact Info Additional Information    [facility] - Emergency Dept Emergency Medicine  As needed, If symptoms worsen [address] [id number] [phone] 1st floor    Aida Snow NP Family Medicine Call in 2 days  [address]  [address]  [place] [address]  [phone]                  [1]   Social History  Tobacco Use    Smoking status: Never    Smokeless tobacco: Never   Substance Use Topics    Alcohol use: Not Currently    Drug use: Not Currently        Robert Hassan MD  03/11/25 1877

## 2025-03-12 NOTE — FIRST PROVIDER EVALUATION
Emergency Department TeleTriage Encounter Note      CHIEF COMPLAINT    Chief Complaint   Patient presents with    Shortness of Breath     X3 weeks. Hx HF       VITAL SIGNS   Initial Vitals [03/11/25 2000]   BP Pulse Resp Temp SpO2   (!) 188/114 108 18 98.9 °F (37.2 °C) 96 %      MAP       --            ALLERGIES    Review of patient's allergies indicates:  No Known Allergies    PROVIDER TRIAGE NOTE  29 year old male with history of CHF who ran out of his Lasix months ago, presents to the ER today with complaints of SOB and exertional chest pain x 2-3 weeks. Reports symptoms have progressively worsened. Denies lower extremity edema.     AAOx3, respirations even and non- labored, stable vitals, normal coloration of skin, sitting upright in triage chair, appears in no acute distress.        ORDERS  Labs Reviewed   HEPATITIS C ANTIBODY   HIV 1 / 2 ANTIBODY       ED Orders (720h ago, onward)      Start Ordered     Status Ordering Provider    03/11/25 2003 03/11/25 2002  Hepatitis C Antibody  STAT         Ordered CHRISTEL DETNON    03/11/25 2003 03/11/25 2002  HIV 1/2 Ag/Ab (4th Gen)  STAT         Ordered CHRISTEL DENTON              Virtual Visit Note: The provider triage portion of this emergency department evaluation and documentation was performed via [employer], a HIPAA-compliant telemedicine application, in concert with a tele-presenter in the room. A face to face patient evaluation with one of my colleagues will occur once the patient is placed in an emergency department room.      DISCLAIMER: This note was prepared with [employer] voice recognition transcription software. Garbled syntax, mangled pronouns, and other bizarre constructions may be attributed to that software system.

## 2025-03-12 NOTE — DISCHARGE INSTRUCTIONS
Please return to the emergency department if you have new or worsening symptoms.  Follow up with the primary care doctor.  Begin taking medications as previously prescribed.

## 2025-03-13 NOTE — TELEPHONE ENCOUNTER
Spoke to pt and he is doing fine. Has been scheduled with Cardiologist. Feels like meds are making him nauseous. Can only come in on a wednesday

## 2025-03-26 ENCOUNTER — OFFICE VISIT (OUTPATIENT)
Dept: CARDIOLOGY | Facility: CLINIC | Age: 30
End: 2025-03-26
Payer: COMMERCIAL

## 2025-03-26 ENCOUNTER — OFFICE VISIT (OUTPATIENT)
Dept: FAMILY MEDICINE | Facility: CLINIC | Age: 30
End: 2025-03-26
Payer: COMMERCIAL

## 2025-03-26 VITALS
OXYGEN SATURATION: 97 % | WEIGHT: 306 LBS | HEIGHT: 68 IN | SYSTOLIC BLOOD PRESSURE: 160 MMHG | BODY MASS INDEX: 46.38 KG/M2 | HEART RATE: 94 BPM | DIASTOLIC BLOOD PRESSURE: 110 MMHG

## 2025-03-26 VITALS
HEART RATE: 101 BPM | HEIGHT: 68 IN | SYSTOLIC BLOOD PRESSURE: 154 MMHG | OXYGEN SATURATION: 97 % | DIASTOLIC BLOOD PRESSURE: 107 MMHG | BODY MASS INDEX: 46.28 KG/M2 | WEIGHT: 305.38 LBS

## 2025-03-26 DIAGNOSIS — F32.A DEPRESSION, UNSPECIFIED DEPRESSION TYPE: ICD-10-CM

## 2025-03-26 DIAGNOSIS — Z71.89 ENCOUNTER FOR EDUCATION ABOUT HEART FAILURE: ICD-10-CM

## 2025-03-26 DIAGNOSIS — I10 PRIMARY HYPERTENSION: ICD-10-CM

## 2025-03-26 DIAGNOSIS — I50.40 COMBINED SYSTOLIC AND DIASTOLIC CONGESTIVE HEART FAILURE, UNSPECIFIED HF CHRONICITY: Primary | ICD-10-CM

## 2025-03-26 DIAGNOSIS — I50.42 CHRONIC COMBINED SYSTOLIC AND DIASTOLIC CONGESTIVE HEART FAILURE: Primary | ICD-10-CM

## 2025-03-26 DIAGNOSIS — I10 HYPERTENSION, UNSPECIFIED TYPE: ICD-10-CM

## 2025-03-26 DIAGNOSIS — I50.9 CONGESTIVE HEART FAILURE, UNSPECIFIED HF CHRONICITY, UNSPECIFIED HEART FAILURE TYPE: ICD-10-CM

## 2025-03-26 PROCEDURE — 3077F SYST BP >= 140 MM HG: CPT | Mod: CPTII,S$GLB,, | Performed by: NURSE PRACTITIONER

## 2025-03-26 PROCEDURE — 99999 PR PBB SHADOW E&M-EST. PATIENT-LVL III: CPT | Mod: PBBFAC,,, | Performed by: NURSE PRACTITIONER

## 2025-03-26 PROCEDURE — 3080F DIAST BP >= 90 MM HG: CPT | Mod: CPTII,S$GLB,, | Performed by: NURSE PRACTITIONER

## 2025-03-26 PROCEDURE — 4010F ACE/ARB THERAPY RXD/TAKEN: CPT | Mod: CPTII,S$GLB,, | Performed by: NURSE PRACTITIONER

## 2025-03-26 PROCEDURE — 99214 OFFICE O/P EST MOD 30 MIN: CPT | Mod: S$GLB,,, | Performed by: NURSE PRACTITIONER

## 2025-03-26 PROCEDURE — 1160F RVW MEDS BY RX/DR IN RCRD: CPT | Mod: CPTII,S$GLB,, | Performed by: NURSE PRACTITIONER

## 2025-03-26 PROCEDURE — 1159F MED LIST DOCD IN RCRD: CPT | Mod: CPTII,S$GLB,, | Performed by: NURSE PRACTITIONER

## 2025-03-26 PROCEDURE — 3008F BODY MASS INDEX DOCD: CPT | Mod: CPTII,S$GLB,, | Performed by: NURSE PRACTITIONER

## 2025-03-26 RX ORDER — SPIRONOLACTONE 25 MG/1
25 TABLET ORAL DAILY
Qty: 90 TABLET | Refills: 3 | Status: SHIPPED | OUTPATIENT
Start: 2025-03-26 | End: 2025-03-26

## 2025-03-26 RX ORDER — METOPROLOL TARTRATE 25 MG/1
50 TABLET, FILM COATED ORAL 2 TIMES DAILY
Qty: 180 TABLET | Refills: 3 | Status: SHIPPED | OUTPATIENT
Start: 2025-03-26

## 2025-03-26 RX ORDER — METOPROLOL TARTRATE 25 MG/1
50 TABLET, FILM COATED ORAL 2 TIMES DAILY
Qty: 180 TABLET | Refills: 3 | Status: SHIPPED | OUTPATIENT
Start: 2025-03-26 | End: 2025-03-26

## 2025-03-26 RX ORDER — SACUBITRIL AND VALSARTAN 24; 26 MG/1; MG/1
1 TABLET, FILM COATED ORAL 2 TIMES DAILY
Qty: 180 TABLET | Refills: 3 | Status: SHIPPED | OUTPATIENT
Start: 2025-03-26

## 2025-03-26 RX ORDER — SPIRONOLACTONE 25 MG/1
25 TABLET ORAL DAILY
Qty: 90 TABLET | Refills: 3 | Status: SHIPPED | OUTPATIENT
Start: 2025-03-26

## 2025-03-26 NOTE — PROGRESS NOTES
Subjective:    Patient ID:  Wenceslao Jeong is a 29 y.o. male.  Chief Complaint   Patient presents with    Follow-up    Shortness of Breath       HPI:    Patient seen today for follow up appointment s/p ER visit for shortness of breath and cough as well as elevated blood pressure. He was started on some medications by the ED provider and had been out of his previously prescribed medications for some time. He had unfortunately been lost to follow up since 2023.     Review of patient's allergies indicates:  No Known Allergies    Past Medical History:   Diagnosis Date    CHF (congestive heart failure)     Hypertension      Past Surgical History:   Procedure Laterality Date    LEFT HEART CATHETERIZATION Left 3/14/2023    Procedure: Left heart cath;  Surgeon: Joey Sousa MD;  Location: [facility] CATH/EP LAB;  Service: Cardiology;  Laterality: Left;     Social History[1]  Family History   Problem Relation Name Age of Onset    Diabetes Mother      Heart disease Mother      Cancer Father      Drug abuse Maternal Aunt      Heart disease Maternal Grandmother      Drug abuse Maternal Grandfather      Hypertension Maternal Grandfather          Review of Systems:   Per HPI         Objective:        Vitals:    03/26/25 1006   BP: (!) 154/107   Pulse: 101       Lab Results   Component Value Date    WBC 11.50 03/11/2025    HGB 14.2 03/11/2025    HCT 40.9 03/11/2025     03/11/2025    CHOL 185 08/01/2023    TRIG 259 (H) 08/01/2023    HDL 70 08/01/2023    ALT 22 03/11/2025    AST 24 03/11/2025     03/11/2025    K 3.4 (L) 03/11/2025     03/11/2025    CREATININE 1.3 03/11/2025    BUN 16 03/11/2025    CO2 29 03/11/2025    TSH 1.300 03/08/2023    INR 1.1 03/13/2023    HGBA1C 5.8 03/08/2023        ECHOCARDIOGRAM RESULTS  Results for orders placed during the hospital encounter of 06/09/23    Echo    Interpretation Summary  · The left ventricle is moderately enlarged with moderately decreased systolic function.  ·  Grade III left ventricular diastolic dysfunction.  · The estimated ejection fraction is 32%.  · There is moderate left ventricular global hypokinesis.  · Normal right ventricular size with normal right ventricular systolic function.  · Mild left atrial enlargement.  · Mild mitral regurgitation.  · Mild tricuspid regurgitation.  · Normal central venous pressure (3 mmHg).  · The estimated PA systolic pressure is 26 mmHg.        CURRENT/PREVIOUS VISIT EKG  Results for orders placed or performed during the hospital encounter of 03/11/25   EKG 12-lead    Collection Time: 03/11/25  7:58 PM   Result Value Ref Range    QRS Duration 90 ms    [facility] QTC Calculation 495 ms    Narrative    Test Reason : R07.9,    Vent. Rate : 109 BPM     Atrial Rate : 109 BPM     P-R Int : 168 ms          QRS Dur :  90 ms      QT Int : 368 ms       P-R-T Axes :  57  16  76 degrees    QTcB Int : 495 ms    Sinus tachycardia  Possible Left atrial enlargement  Nonspecific T wave abnormality  Abnormal ECG  Confirmed by Barrett Fisher ([id number]) on 3/12/2025 7:42:26 PM    Referred By: AAAREFERRAL SELF           Confirmed By: Barrett Fisher     No valid procedures specified.   No results found for this or any previous visit.      Physical Exam:  CONSTITUTIONAL: No fever, no chills  HEENT: Normocephalic, atraumatic,pupils reactive to light                 NECK:  No JVD no carotid bruit  CVS: S1S2+, RRR  LUNGS: Clear  ABDOMEN: Soft, NT, BS+  EXTREMITIES: No cyanosis, edema  : No serna catheter  NEURO: AAO X 3  PSY: Normal affect      Medication List with Changes/Refills   New Medications    EMPAGLIFLOZIN (JARDIANCE) 10 MG TABLET    Take 1 tablet (10 mg total) by mouth once daily.    SACUBITRIL-VALSARTAN (ENTRESTO) 24-26 MG PER TABLET    Take 1 tablet by mouth 2 (two) times daily.   Current Medications    SERTRALINE (ZOLOFT) 50 MG TABLET    Take 1 tablet (50 mg total) by mouth once daily.   Changed and/or Refilled Medications    Modified  Medication Previous Medication    METOPROLOL TARTRATE (LOPRESSOR) 25 MG TABLET metoprolol tartrate (LOPRESSOR) 25 MG tablet       Take 2 tablets (50 mg total) by mouth 2 (two) times daily.    Take 1 tablet (25 mg total) by mouth 2 (two) times daily.    SPIRONOLACTONE (ALDACTONE) 25 MG TABLET spironolactone (ALDACTONE) 25 MG tablet       Take 1 tablet (25 mg total) by mouth once daily.    Take 1 tablet (25 mg total) by mouth once daily.   Discontinued Medications    ISOSORBIDE MONONITRATE (IMDUR) 30 MG 24 HR TABLET    Take 1 tablet (30 mg total) by mouth once daily.             Assessment:       1. Combined systolic and diastolic congestive heart failure, unspecified HF chronicity    2. Primary hypertension    3. Congestive heart failure, unspecified HF chronicity, unspecified heart failure type         Plan:     Problem List Items Addressed This Visit          Unprioritized    HTN (hypertension)    Current Assessment & Plan   Blood pressure not well controlled on current regimen.  Will increase his metoprolol and start him back on Entresto which he had previously taken.         Relevant Medications    sacubitriL-valsartan (ENTRESTO) 24-26 mg per tablet    metoprolol tartrate (LOPRESSOR) 25 MG tablet    Other Relevant Orders    Echo    Combined systolic and diastolic congestive heart failure - Primary    Current Assessment & Plan   Patient had elevated BNP but chest x-ray did not show concern for significant pulmonary edema or pleural effusions.  Will maximize goal directed medical therapy.  Continue spironolactone 25 mg p.o. daily and add Jardiance 10 mg p.o. daily.  Continue metoprolol tartrate but increase to 50 mg p.o. b.i.d..  Resume Entresto at 24-26 mg p.o. b.i.d..  Can stop Imdur for now.  Will repeat an echo to evaluate LV function and valves.         Relevant Medications    sacubitriL-valsartan (ENTRESTO) 24-26 mg per tablet    empagliflozin (JARDIANCE) 10 mg tablet    spironolactone (ALDACTONE) 25 MG  tablet    metoprolol tartrate (LOPRESSOR) 25 MG tablet    Other Relevant Orders    Echo     Other Visit Diagnoses         Congestive heart failure, unspecified HF chronicity, unspecified heart failure type                Follow up in about 6 weeks (around 5/7/2025).          DENNIS Jiménez  Department of Cardiology   [facility] [place], [place]         [1]   Social History  Tobacco Use    Smoking status: Never    Smokeless tobacco: Never   Substance Use Topics    Alcohol use: Not Currently    Drug use: Not Currently

## 2025-03-26 NOTE — ASSESSMENT & PLAN NOTE
Patient had elevated BNP but chest x-ray did not show concern for significant pulmonary edema or pleural effusions.  Will maximize goal directed medical therapy.  Continue spironolactone 25 mg p.o. daily and add Jardiance 10 mg p.o. daily.  Continue metoprolol tartrate but increase to 50 mg p.o. b.i.d..  Resume Entresto at 24-26 mg p.o. b.i.d..  Can stop Imdur for now.  Will repeat an echo to evaluate LV function and valves.

## 2025-03-26 NOTE — ASSESSMENT & PLAN NOTE
Blood pressure not well controlled on current regimen.  Will increase his metoprolol and start him back on Entresto which he had previously taken.

## 2025-03-29 NOTE — ASSESSMENT & PLAN NOTE
CXR completed and showed right mid and lower lung pneumonia. Patient with acute hypoxia with PO2 50 and POC sats 85%, placed on Bipap with sats improving. Hs is afebrile and without leukocytosis. Lactic acid level within normal range.     O2 PRN - keep sats >94%, pulse oximetry q 4 with vital signs  Xopenex q8 hours, currently ST on monitor   Continue empiric IV ABX for now, deescalate pending procal/inflammatory makers  Tessalon pearls  IS            Medication history complete, patient unable to provide history, called aide and son unable to provide history.  Aide called pharmacy and got a list sent to her.  Went off list.

## 2025-04-02 ENCOUNTER — TELEPHONE (OUTPATIENT)
Dept: FAMILY MEDICINE | Facility: CLINIC | Age: 30
End: 2025-04-02
Payer: COMMERCIAL

## 2025-04-02 ENCOUNTER — HOSPITAL ENCOUNTER (OUTPATIENT)
Dept: CARDIOLOGY | Facility: HOSPITAL | Age: 30
Discharge: HOME | End: 2025-04-02
Attending: NURSE PRACTITIONER
Payer: COMMERCIAL

## 2025-04-02 VITALS — WEIGHT: 306 LBS | HEIGHT: 68 IN | BODY MASS INDEX: 46.38 KG/M2

## 2025-04-02 DIAGNOSIS — I50.40 COMBINED SYSTOLIC AND DIASTOLIC CONGESTIVE HEART FAILURE, UNSPECIFIED HF CHRONICITY: ICD-10-CM

## 2025-04-02 DIAGNOSIS — I10 PRIMARY HYPERTENSION: ICD-10-CM

## 2025-04-02 LAB
AORTIC ROOT ANNULUS: 3.03 CM
AORTIC VALVE CUSP SEPERATION: 2.09 CM
APICAL FOUR CHAMBER EJECTION FRACTION: 41 %
APICAL TWO CHAMBER EJECTION FRACTION: 22 %
AV INDEX (PROSTH): 0.69
AV MEAN GRADIENT: 3 MMHG
AV PEAK GRADIENT: 6 MMHG
AV VALVE AREA BY VELOCITY RATIO: 2.8 CM²
AV VALVE AREA: 2.9 CM²
AV VELOCITY RATIO: 0.67
BSA FOR ECHO PROCEDURE: 2.58 M2
CV ECHO LV RWT: 0.32 CM
DOP CALC AO PEAK VEL: 1.2 M/S
DOP CALC AO VTI: 18.4 CM
DOP CALC LVOT AREA: 4.2 CM2
DOP CALC LVOT DIAMETER: 2.3 CM
DOP CALC LVOT PEAK VEL: 0.8 M/S
DOP CALC LVOT STROKE VOLUME: 52.7 CM3
DOP CALC MV VTI: 22.4 CM
DOP CALCLVOT PEAK VEL VTI: 12.7 CM
E WAVE DECELERATION TIME: 124 MSEC
E/A RATIO: 1.9
E/E' RATIO: 23 M/S
ECHO LV POSTERIOR WALL: 1.2 CM (ref 0.6–1.1)
FRACTIONAL SHORTENING: 13.2 % (ref 28–44)
INTERVENTRICULAR SEPTUM: 1.2 CM (ref 0.6–1.1)
IVC DIAMETER: 1.34 CM
IVRT: 60 MSEC
LA MAJOR: 6.9 CM
LA MINOR: 6.4 CM
LEFT ATRIUM SIZE: 5.1 CM
LEFT INTERNAL DIMENSION IN SYSTOLE: 6.6 CM (ref 2.1–4)
LEFT VENTRICLE DIASTOLIC VOLUME INDEX: 126.53 ML/M2
LEFT VENTRICLE DIASTOLIC VOLUME: 310 ML
LEFT VENTRICLE END DIASTOLIC VOLUME APICAL 2 CHAMBER: 235.76 ML
LEFT VENTRICLE END DIASTOLIC VOLUME APICAL 4 CHAMBER: 253.02 ML
LEFT VENTRICLE MASS INDEX: 190.8 G/M2
LEFT VENTRICLE SYSTOLIC VOLUME INDEX: 92.2 ML/M2
LEFT VENTRICLE SYSTOLIC VOLUME: 226 ML
LEFT VENTRICULAR INTERNAL DIMENSION IN DIASTOLE: 7.6 CM (ref 3.5–6)
LEFT VENTRICULAR MASS: 467.4 G
LV LATERAL E/E' RATIO: 21.2 M/S
LV SEPTAL E/E' RATIO: 25.4 M/S
LVED V (TEICH): 309.82 ML
LVES V (TEICH): 226.19 ML
LVOT MG: 1.38 MMHG
LVOT MV: 0.55 CM/S
MV A" WAVE DURATION": 108.47 MSEC
MV MEAN GRADIENT: 3 MMHG
MV PEAK A VEL: 0.67 M/S
MV PEAK E VEL: 1.27 M/S
MV PEAK GRADIENT: 7 MMHG
MV STENOSIS PRESSURE HALF TIME: 53.06 MS
MV VALVE AREA BY CONTINUITY EQUATION: 2.35 CM2
MV VALVE AREA P 1/2 METHOD: 4.15 CM2
OHS CV RV/LV RATIO: 0.33 CM
OHS LV EJECTION FRACTION SIMPSONS BIPLANE MOD: 33 %
PISA MRMAX VEL: 5.33 M/S
PISA TR MAX VEL: 3.1 M/S
PV MV: 0.62 M/S
PV PEAK GRADIENT: 3 MMHG
PV PEAK VELOCITY: 0.82 M/S
RA MAJOR: 6.44 CM
RIGHT VENTRICLE DIASTOLIC BASEL DIMENSION: 2.5 CM
RIGHT VENTRICULAR END-DIASTOLIC DIMENSION: 2.49 CM
RV TISSUE DOPPLER FREE WALL SYSTOLIC VELOCITY 1 (APICAL 4 CHAMBER VIEW): 16.16 CM/S
TDI LATERAL: 0.06 M/S
TDI SEPTAL: 0.05 M/S
TDI: 0.06 M/S
TR MAX PG: 38 MMHG
TRICUSPID ANNULAR PLANE SYSTOLIC EXCURSION: 2.72 CM
Z-SCORE OF LEFT VENTRICULAR DIMENSION IN END DIASTOLE: -4.8
Z-SCORE OF LEFT VENTRICULAR DIMENSION IN END SYSTOLE: -0.82

## 2025-04-02 PROCEDURE — 93306 TTE W/DOPPLER COMPLETE: CPT

## 2025-04-02 PROCEDURE — 93306 TTE W/DOPPLER COMPLETE: CPT | Mod: 26,,, | Performed by: GENERAL PRACTICE

## 2025-04-02 NOTE — PROGRESS NOTES
SUBJECTIVE:    Patient ID: Wenceslao Jeong is a 29 y.o. male.    Chief Complaint: ER Follow UP (No bottles//Pt is here for an ER Follow Up on 3/11/25//KE)      History of Present Illness    CHIEF COMPLAINT:  29 year old male presents today for follow up of heart condition. Had appointment earlier today with cardiology    HISTORY OF PRESENT ILLNESS:  He reports progressively worsening shortness of breath and decreased stamina which led to an emergency room visit due to severity.    MEDICATIONS:  He was recently prescribed new medications on the 12th. Current medications include Entresto, Jardiance, Spironolactone (Aldactone), and metoprolol. Past medications include sertraline for anxiety/depression.    SOCIAL HISTORY:  He works as a  but expresses concerns about maintaining employment due to his medical condition. He resides in [place].      ROS:  General: -fever, -chills, -fatigue, -weight gain, -weight loss, +diminished activity  Eyes: -vision changes, -redness, -discharge  ENT: -ear pain, -nasal congestion, -sore throat  Cardiovascular: -chest pain, -palpitations, -lower extremity edema  Respiratory: -cough, +shortness of breath  Gastrointestinal: -abdominal pain, -nausea, -vomiting, -diarrhea, -constipation, -blood in stool  Genitourinary: -dysuria, -hematuria, -frequency  Musculoskeletal: -joint pain, -muscle pain  Skin: -rash, -lesion  Neurological: -headache, -dizziness, -numbness, -tingling  Psychiatric: -anxiety, -depression, -sleep difficulty              Admission on 03/11/2025, Discharged on 03/11/2025   Component Date Value Ref Range Status    Hepatitis C Ab 03/11/2025 Negative  Negative Final    HIV 1/2 Ag/Ab 03/11/2025 Negative  Negative Final    WBC 03/11/2025 11.50  3.90 - 12.70 K/uL Final    RBC 03/11/2025 5.25  4.60 - 6.20 M/uL Final    Hemoglobin 03/11/2025 14.2  14.0 - 18.0 g/dL Final    Hematocrit 03/11/2025 40.9  40.0 - 54.0 % Final    MCV 03/11/2025 78 (L)  82 - 98 fL Final    MCH  03/11/2025 27.0  27.0 - 31.0 pg Final    MCHC 03/11/2025 34.7  32.0 - 36.0 g/dL Final    RDW 03/11/2025 14.6 (H)  11.5 - 14.5 % Final    Platelets 03/11/2025 227  150 - 450 K/uL Final    MPV 03/11/2025 12.4  9.2 - 12.9 fL Final    Immature Granulocytes 03/11/2025 0.3  0.0 - 0.5 % Final    Gran # (ANC) 03/11/2025 6.6  1.8 - 7.7 K/uL Final    Immature Grans (Abs) 03/11/2025 0.04  0.00 - 0.04 K/uL Final    Lymph # 03/11/2025 3.9  1.0 - 4.8 K/uL Final    Mono # 03/11/2025 0.8  0.3 - 1.0 K/uL Final    Eos # 03/11/2025 0.1  0.0 - 0.5 K/uL Final    Baso # 03/11/2025 0.05  0.00 - 0.20 K/uL Final    nRBC 03/11/2025 0  0 /100 WBC Final    Gran % 03/11/2025 57.9  38.0 - 73.0 % Final    Lymph % 03/11/2025 33.8  18.0 - 48.0 % Final    Mono % 03/11/2025 7.1  4.0 - 15.0 % Final    Eosinophil % 03/11/2025 0.5  0.0 - 8.0 % Final    Basophil % 03/11/2025 0.4  0.0 - 1.9 % Final    Differential Method 03/11/2025 Automated   Final    Sodium 03/11/2025 142  136 - 145 mmol/L Final    Potassium 03/11/2025 3.4 (L)  3.5 - 5.1 mmol/L Final    Chloride 03/11/2025 103  95 - 110 mmol/L Final    CO2 03/11/2025 29  23 - 29 mmol/L Final    Glucose 03/11/2025 109  70 - 110 mg/dL Final    BUN 03/11/2025 16  6 - 20 mg/dL Final    Creatinine 03/11/2025 1.3  0.5 - 1.4 mg/dL Final    Calcium 03/11/2025 9.2  8.7 - 10.5 mg/dL Final    Total Protein 03/11/2025 7.5  6.0 - 8.4 g/dL Final    Albumin 03/11/2025 4.2  3.5 - 5.2 g/dL Final    Total Bilirubin 03/11/2025 0.8  0.1 - 1.0 mg/dL Final    Alkaline Phosphatase 03/11/2025 66  55 - 135 U/L Final    AST 03/11/2025 24  10 - 40 U/L Final    ALT 03/11/2025 22  10 - 44 U/L Final    eGFR 03/11/2025 >60.0  >60 mL/min/1.73 m^2 Final    Anion Gap 03/11/2025 10  8 - 16 mmol/L Final    BNP 03/11/2025 323 (H)  0 - 99 pg/mL Final    Troponin I High Sensitivity 03/11/2025 27.2 (H)  0.0 - 14.9 pg/mL Final    QRS Duration 03/11/2025 90  ms Final    [facility] QTC Calculation 03/11/2025 495  ms Final    D-Dimer 03/11/2025 0.28   "0.00 - 0.49 mg/L FEU Final       Past Medical History:   Diagnosis Date    CHF (congestive heart failure)     Hypertension      Past Surgical History:   Procedure Laterality Date    LEFT HEART CATHETERIZATION Left 3/14/2023    Procedure: Left heart cath;  Surgeon: Joey Sousa MD;  Location: [facility] CATH/EP LAB;  Service: Cardiology;  Laterality: Left;     Family History   Problem Relation Name Age of Onset    Diabetes Mother      Heart disease Mother      Cancer Father      Drug abuse Maternal Aunt      Heart disease Maternal Grandmother      Drug abuse Maternal Grandfather      Hypertension Maternal Grandfather         Tests to Keep You Healthy    Last Blood Pressure <= 139/89 (3/26/2025): NO      The CVD Risk score (NATALIO'Agostino, et al., 2008) failed to calculate for the following reasons:    The patient has a prior MI, stroke, CHF, or peripheral vascular disease diagnosis     Marital Status: Single  Alcohol History:  reports that he does not currently use alcohol.  Tobacco History:  reports that he has never smoked. He has never used smokeless tobacco.  Drug History:  reports that he does not currently use drugs.    Health Maintenance Topics with due status: Not Due       Topic Last Completion Date    RSV Vaccine (Age 60+ and Pregnant patients) Not Due       There is no immunization history on file for this patient.    Review of patient's allergies indicates:  No Known Allergies  Current Medications[1]        Objective:      Vitals:    03/26/25 1119   BP: (!) 160/110   Pulse: 94   SpO2: 97%   Weight: (!) 138.8 kg (306 lb)   Height: 5' 8" (1.727 m)       Physical Exam  Constitutional:       General: He is not in acute distress.     Appearance: Normal appearance. He is well-developed. He is obese. He is not ill-appearing.   Eyes:      Pupils: Pupils are equal, round, and reactive to light.   Neck:      Trachea: No tracheal deviation.   Cardiovascular:      Rate and Rhythm: Normal rate and regular rhythm.      " Heart sounds: No murmur heard.     No friction rub. No gallop.   Pulmonary:      Breath sounds: Normal breath sounds. No stridor. No wheezing or rales.   Abdominal:      Palpations: Abdomen is soft. There is no mass.      Tenderness: There is no abdominal tenderness.   Musculoskeletal:         General: No tenderness or deformity.      Cervical back: Neck supple.   Lymphadenopathy:      Cervical: No cervical adenopathy.   Skin:     General: Skin is warm and dry.   Neurological:      Mental Status: He is alert and oriented to person, place, and time.      Coordination: Coordination normal.   Psychiatric:         Mood and Affect: Mood is depressed. Affect is flat.         Thought Content: Thought content normal.            Assessment:       1. Chronic combined systolic and diastolic congestive heart failure    2. Hypertension, unspecified type    3. Depression, unspecified depression type    4. Encounter for education about heart failure           Assessment & Plan    - Heart condition requires life-sustaining medications.  - Branded drugs (Jardiance, Entresto) are expensive but necessary for treatment.  - Generic medications (spironolactone, metoprolol) should be affordable even without insurance coverage.  - Considered options for medication coverage, including Medicaid as secondary insurance or indigent medication programs from pharmaceutical companies.  - Evaluated need for continuation of sertraline (Zoloft) for anxiety/depression management.    CARDIOMEGALY:  - Initiated Jardiance 10 mg treatment and provided samples to bridge until insurance coverage or indigent program is established.  - Started Entresto treatment, pending establishment of coverage through insurance or indigent program.  - Prescribed spironolactone (Aldactone) as a diuretic.  - Initiated metoprolol treatment.  - Scheduled the patient for a cardiologist appointment and echocardiogram next week.  - Acknowledged the patient's heart condition and  the need for life-sustaining medications.  - Explored options for medication coverage, including Medicaid as secondary insurance or indigent medication programs.    DYSPNEA:  - Wenceslao reports feeling unwell, with dyspnea and decreased stamina.  - Wenceslao reports experiencing dyspnea.  - Performed breathing exercises with the patient to alleviate symptoms.    ANXIETY DISORDER:  - Inquired about the patient's need for anxiety/depression medication.  - Noted that the patient was previously prescribed sertraline (Zoloft) for anxiety and depression.        Plan:       1. Chronic combined systolic and diastolic congestive heart failure  Comments:  restart  ENTRESTO 24-26 mg per tablet    JARDIANCE 10 mg tablet   ALDACTONE 25 MG tablet    LOPRESSOR 25 MG tablet    2. Hypertension, unspecified type  Comments:  resume meds. call with bp readings    3. Depression, unspecified depression type  Comments:  declines meds    4. Encounter for education about heart failure  Comments:  discussed importance of taking meds as prescribed. echo ordered      Follow up in about 6 weeks (around 5/7/2025), or if symptoms worsen or fail to improve, for medication management.          Counseled on age and gender appropriate medical preventative services, including cancer screenings, immunizations, overall nutritional health, need for a consistent exercise regimen and an overall push towards maintaining a vigorous and active lifestyle.      This note was generated with the assistance of ambient listening technology. Verbal consent was obtained by the patient and accompanying visitor(s) for the recording of patient appointment to facilitate this note. I attest to having reviewed and edited the generated note for accuracy, though some syntax or spelling errors may persist. Please contact the author of this note for any clarification.       4/2/2025 Aida Snow NP    Answers submitted by the patient for this visit:  Review of Systems  Questionnaire (Submitted on 3/19/2025)  activity change: No  unexpected weight change: No  neck pain: No  hearing loss: No  rhinorrhea: No  trouble swallowing: No  eye discharge: No  visual disturbance: No  chest tightness: No  wheezing: Yes  chest pain: No  palpitations: No  blood in stool: No  constipation: No  vomiting: No  diarrhea: No  polydipsia: No  polyuria: No  difficulty urinating: No  urgency: No  hematuria: No  joint swelling: No  arthralgias: No  headaches: No  weakness: No  confusion: No  dysphoric mood: Yes         [1]   Current Outpatient Medications:     empagliflozin (JARDIANCE) 10 mg tablet, Take 1 tablet (10 mg total) by mouth once daily., Disp: 90 tablet, Rfl: 3    metoprolol tartrate (LOPRESSOR) 25 MG tablet, Take 2 tablets (50 mg total) by mouth 2 (two) times daily., Disp: 180 tablet, Rfl: 3    sacubitriL-valsartan (ENTRESTO) 24-26 mg per tablet, Take 1 tablet by mouth 2 (two) times daily., Disp: 180 tablet, Rfl: 3    sertraline (ZOLOFT) 50 MG tablet, Take 1 tablet (50 mg total) by mouth once daily., Disp: 30 tablet, Rfl: 11    spironolactone (ALDACTONE) 25 MG tablet, Take 1 tablet (25 mg total) by mouth once daily., Disp: 90 tablet, Rfl: 3

## 2025-04-03 NOTE — TELEPHONE ENCOUNTER
----- Message from Aida Snow NP sent at 4/2/2025 12:45 AM CDT -----  Updated bp reading?  
----- Message from Kim sent at 4/2/2025  4:29 PM CDT -----  [phone] returning [phone]  
Attempted no answer   
Spoke to and he has not been checked and will check it tonight and in the morning and let us know   
09-Jan-2021 11:46

## 2025-04-04 ENCOUNTER — RESULTS FOLLOW-UP (OUTPATIENT)
Dept: CARDIOLOGY | Facility: HOSPITAL | Age: 30
End: 2025-04-04

## 2025-04-04 ENCOUNTER — TELEPHONE (OUTPATIENT)
Dept: TRANSPLANT | Facility: CLINIC | Age: 30
End: 2025-04-04
Payer: COMMERCIAL

## 2025-04-04 DIAGNOSIS — I50.9 CONGESTIVE HEART FAILURE, UNSPECIFIED HF CHRONICITY, UNSPECIFIED HEART FAILURE TYPE: Primary | ICD-10-CM

## 2025-04-04 DIAGNOSIS — I50.40 COMBINED SYSTOLIC AND DIASTOLIC CONGESTIVE HEART FAILURE, UNSPECIFIED HF CHRONICITY: Primary | ICD-10-CM

## 2025-04-08 ENCOUNTER — HOSPITAL ENCOUNTER (INPATIENT)
Facility: HOSPITAL | Age: 30
LOS: 2 days | Discharge: HOME | DRG: 291 | End: 2025-04-10
Attending: EMERGENCY MEDICINE
Payer: COMMERCIAL

## 2025-04-08 DIAGNOSIS — R07.9 CHEST PAIN: ICD-10-CM

## 2025-04-08 DIAGNOSIS — I50.43 ACUTE ON CHRONIC COMBINED SYSTOLIC AND DIASTOLIC CONGESTIVE HEART FAILURE: Primary | ICD-10-CM

## 2025-04-08 DIAGNOSIS — R06.02 SHORTNESS OF BREATH: ICD-10-CM

## 2025-04-08 DIAGNOSIS — F32.A DEPRESSION, UNSPECIFIED DEPRESSION TYPE: ICD-10-CM

## 2025-04-08 DIAGNOSIS — I50.40 COMBINED SYSTOLIC AND DIASTOLIC CONGESTIVE HEART FAILURE, UNSPECIFIED HF CHRONICITY: ICD-10-CM

## 2025-04-08 DIAGNOSIS — Z79.899 HIGH RISK MEDICATION USE: ICD-10-CM

## 2025-04-08 DIAGNOSIS — I10 PRIMARY HYPERTENSION: ICD-10-CM

## 2025-04-08 DIAGNOSIS — J81.0 ACUTE PULMONARY EDEMA: ICD-10-CM

## 2025-04-08 LAB
25(OH)D3+25(OH)D2 SERPL-MCNC: 15 NG/ML (ref 30–96)
ABSOLUTE EOSINOPHIL (SMH): 0.09 K/UL
ABSOLUTE MONOCYTE (SMH): 0.95 K/UL (ref 0.3–1)
ABSOLUTE NEUTROPHIL COUNT (SMH): 8.8 K/UL (ref 1.8–7.7)
ALBUMIN SERPL-MCNC: 4.2 G/DL (ref 3.5–5.2)
ALP SERPL-CCNC: 66 UNIT/L (ref 55–135)
ALT SERPL-CCNC: 29 UNIT/L (ref 10–44)
ANION GAP (SMH): 9 MMOL/L (ref 8–16)
AST SERPL-CCNC: 23 UNIT/L (ref 10–40)
BASOPHILS # BLD AUTO: 0.06 K/UL
BASOPHILS NFR BLD AUTO: 0.4 %
BILIRUB SERPL-MCNC: 0.9 MG/DL (ref 0.1–1)
BNP SERPL-MCNC: 497 PG/ML
BUN SERPL-MCNC: 16 MG/DL (ref 6–20)
CALCIUM SERPL-MCNC: 9 MG/DL (ref 8.7–10.5)
CHLORIDE SERPL-SCNC: 106 MMOL/L (ref 95–110)
CO2 SERPL-SCNC: 23 MMOL/L (ref 23–29)
CREAT SERPL-MCNC: 1.3 MG/DL (ref 0.5–1.4)
EAG (SMH): 123 MG/DL (ref 68–131)
ERYTHROCYTE [DISTWIDTH] IN BLOOD BY AUTOMATED COUNT: 15.4 % (ref 11.5–14.5)
FOLATE SERPL-MCNC: >22.3 NG/ML (ref 4–24)
GFR SERPLBLD CREATININE-BSD FMLA CKD-EPI: >60 ML/MIN/1.73/M2
GLUCOSE SERPL-MCNC: 135 MG/DL (ref 70–110)
HBA1C MFR BLD: 5.9 % (ref 4.5–6.2)
HCT VFR BLD AUTO: 39.1 % (ref 40–54)
HGB BLD-MCNC: 13.6 GM/DL (ref 14–18)
IMM GRANULOCYTES # BLD AUTO: 0.05 K/UL (ref 0–0.04)
IMM GRANULOCYTES NFR BLD AUTO: 0.4 % (ref 0–0.5)
INR PPP: 1 (ref 0.8–1.2)
LYMPHOCYTES # BLD AUTO: 3.77 K/UL (ref 1–4.8)
MAGNESIUM SERPL-MCNC: 1.9 MG/DL (ref 1.6–2.6)
MCH RBC QN AUTO: 27.3 PG (ref 27–31)
MCHC RBC AUTO-ENTMCNC: 34.8 G/DL (ref 32–36)
MCV RBC AUTO: 78 FL (ref 82–98)
NUCLEATED RBC (/100WBC) (SMH): 0 /100 WBC
PLATELET # BLD AUTO: 272 K/UL (ref 150–450)
PMV BLD AUTO: 11.7 FL (ref 9.2–12.9)
POTASSIUM SERPL-SCNC: 3.8 MMOL/L (ref 3.5–5.1)
PROCALCITONIN SERPL-MCNC: 0.09 NG/ML
PROT SERPL-MCNC: 7.5 GM/DL (ref 6–8.4)
PROTHROMBIN TIME: 11.5 SECONDS (ref 9–12.5)
RBC # BLD AUTO: 4.99 M/UL (ref 4.6–6.2)
RELATIVE EOSINOPHIL (SMH): 0.7 % (ref 0–8)
RELATIVE LYMPHOCYTE (SMH): 27.5 % (ref 18–48)
RELATIVE MONOCYTE (SMH): 6.9 % (ref 4–15)
RELATIVE NEUTROPHIL (SMH): 64.1 % (ref 38–73)
SODIUM SERPL-SCNC: 138 MMOL/L (ref 136–145)
T4 FREE SERPL-MCNC: 1 NG/DL (ref 0.71–1.51)
TROPONIN HIGH SENSITIVE (SMH): 18.7 PG/ML
TSH SERPL-ACNC: 1.93 UIU/ML (ref 0.34–5.6)
VIT B12 SERPL-MCNC: 349 PG/ML (ref 210–950)
WBC # BLD AUTO: 13.71 K/UL (ref 3.9–12.7)

## 2025-04-08 PROCEDURE — 84145 PROCALCITONIN (PCT): CPT | Performed by: STUDENT IN AN ORGANIZED HEALTH CARE EDUCATION/TRAINING PROGRAM

## 2025-04-08 PROCEDURE — 25000003 PHARM REV CODE 250: Performed by: INTERNAL MEDICINE

## 2025-04-08 PROCEDURE — 83735 ASSAY OF MAGNESIUM: CPT | Performed by: EMERGENCY MEDICINE

## 2025-04-08 PROCEDURE — 84443 ASSAY THYROID STIM HORMONE: CPT | Performed by: INTERNAL MEDICINE

## 2025-04-08 PROCEDURE — 96376 TX/PRO/DX INJ SAME DRUG ADON: CPT

## 2025-04-08 PROCEDURE — 82746 ASSAY OF FOLIC ACID SERUM: CPT | Performed by: INTERNAL MEDICINE

## 2025-04-08 PROCEDURE — 36415 COLL VENOUS BLD VENIPUNCTURE: CPT | Performed by: STUDENT IN AN ORGANIZED HEALTH CARE EDUCATION/TRAINING PROGRAM

## 2025-04-08 PROCEDURE — 63600175 PHARM REV CODE 636 W HCPCS: Performed by: INTERNAL MEDICINE

## 2025-04-08 PROCEDURE — 84484 ASSAY OF TROPONIN QUANT: CPT | Performed by: EMERGENCY MEDICINE

## 2025-04-08 PROCEDURE — 94799 UNLISTED PULMONARY SVC/PX: CPT

## 2025-04-08 PROCEDURE — 27000221 HC OXYGEN, UP TO 24 HOURS

## 2025-04-08 PROCEDURE — 83880 ASSAY OF NATRIURETIC PEPTIDE: CPT | Performed by: EMERGENCY MEDICINE

## 2025-04-08 PROCEDURE — 93005 ELECTROCARDIOGRAM TRACING: CPT | Performed by: GENERAL PRACTICE

## 2025-04-08 PROCEDURE — 96374 THER/PROPH/DIAG INJ IV PUSH: CPT

## 2025-04-08 PROCEDURE — 94761 N-INVAS EAR/PLS OXIMETRY MLT: CPT

## 2025-04-08 PROCEDURE — 25000242 PHARM REV CODE 250 ALT 637 W/ HCPCS: Performed by: EMERGENCY MEDICINE

## 2025-04-08 PROCEDURE — 99291 CRITICAL CARE FIRST HOUR: CPT

## 2025-04-08 PROCEDURE — 82306 VITAMIN D 25 HYDROXY: CPT | Performed by: INTERNAL MEDICINE

## 2025-04-08 PROCEDURE — 80053 COMPREHEN METABOLIC PANEL: CPT | Performed by: EMERGENCY MEDICINE

## 2025-04-08 PROCEDURE — 82607 VITAMIN B-12: CPT | Performed by: INTERNAL MEDICINE

## 2025-04-08 PROCEDURE — 12000002 HC ACUTE/MED SURGE SEMI-PRIVATE ROOM

## 2025-04-08 PROCEDURE — 83036 HEMOGLOBIN GLYCOSYLATED A1C: CPT | Performed by: INTERNAL MEDICINE

## 2025-04-08 PROCEDURE — 99900035 HC TECH TIME PER 15 MIN (STAT)

## 2025-04-08 PROCEDURE — 84439 ASSAY OF FREE THYROXINE: CPT | Performed by: INTERNAL MEDICINE

## 2025-04-08 PROCEDURE — 63600175 PHARM REV CODE 636 W HCPCS: Performed by: EMERGENCY MEDICINE

## 2025-04-08 PROCEDURE — 99900031 HC PATIENT EDUCATION (STAT)

## 2025-04-08 PROCEDURE — 93010 ELECTROCARDIOGRAM REPORT: CPT | Mod: ,,, | Performed by: GENERAL PRACTICE

## 2025-04-08 PROCEDURE — 85025 COMPLETE CBC W/AUTO DIFF WBC: CPT | Performed by: EMERGENCY MEDICINE

## 2025-04-08 PROCEDURE — 85610 PROTHROMBIN TIME: CPT | Performed by: EMERGENCY MEDICINE

## 2025-04-08 PROCEDURE — 99223 1ST HOSP IP/OBS HIGH 75: CPT | Mod: FS,,, | Performed by: INTERNAL MEDICINE

## 2025-04-08 RX ORDER — POTASSIUM CHLORIDE 20 MEQ/1
20 TABLET, EXTENDED RELEASE ORAL 2 TIMES DAILY
Status: DISCONTINUED | OUTPATIENT
Start: 2025-04-08 | End: 2025-04-10 | Stop reason: HOSPADM

## 2025-04-08 RX ORDER — GLUCAGON 1 MG
1 KIT INJECTION
Status: DISCONTINUED | OUTPATIENT
Start: 2025-04-08 | End: 2025-04-10 | Stop reason: HOSPADM

## 2025-04-08 RX ORDER — SODIUM CHLORIDE 0.9 % (FLUSH) 0.9 %
3 SYRINGE (ML) INJECTION EVERY 12 HOURS PRN
Status: DISCONTINUED | OUTPATIENT
Start: 2025-04-08 | End: 2025-04-10 | Stop reason: HOSPADM

## 2025-04-08 RX ORDER — LANOLIN ALCOHOL/MO/W.PET/CERES
800 CREAM (GRAM) TOPICAL
Status: DISCONTINUED | OUTPATIENT
Start: 2025-04-08 | End: 2025-04-10 | Stop reason: HOSPADM

## 2025-04-08 RX ORDER — SODIUM,POTASSIUM PHOSPHATES 280-250MG
2 POWDER IN PACKET (EA) ORAL
Status: DISCONTINUED | OUTPATIENT
Start: 2025-04-08 | End: 2025-04-10 | Stop reason: HOSPADM

## 2025-04-08 RX ORDER — SPIRONOLACTONE 25 MG/1
50 TABLET ORAL 2 TIMES DAILY
Status: DISCONTINUED | OUTPATIENT
Start: 2025-04-08 | End: 2025-04-10 | Stop reason: HOSPADM

## 2025-04-08 RX ORDER — AMOXICILLIN 250 MG
1 CAPSULE ORAL 2 TIMES DAILY
Status: DISCONTINUED | OUTPATIENT
Start: 2025-04-08 | End: 2025-04-10 | Stop reason: HOSPADM

## 2025-04-08 RX ORDER — TALC
6 POWDER (GRAM) TOPICAL NIGHTLY PRN
Status: DISCONTINUED | OUTPATIENT
Start: 2025-04-08 | End: 2025-04-10 | Stop reason: HOSPADM

## 2025-04-08 RX ORDER — FUROSEMIDE 10 MG/ML
40 INJECTION INTRAMUSCULAR; INTRAVENOUS EVERY 12 HOURS
Status: DISCONTINUED | OUTPATIENT
Start: 2025-04-08 | End: 2025-04-10 | Stop reason: HOSPADM

## 2025-04-08 RX ORDER — IBUPROFEN 200 MG
24 TABLET ORAL
Status: DISCONTINUED | OUTPATIENT
Start: 2025-04-08 | End: 2025-04-10 | Stop reason: HOSPADM

## 2025-04-08 RX ORDER — FUROSEMIDE 10 MG/ML
40 INJECTION INTRAMUSCULAR; INTRAVENOUS 3 TIMES DAILY
Status: DISCONTINUED | OUTPATIENT
Start: 2025-04-08 | End: 2025-04-08

## 2025-04-08 RX ORDER — ONDANSETRON HYDROCHLORIDE 2 MG/ML
4 INJECTION, SOLUTION INTRAVENOUS EVERY 6 HOURS PRN
Status: DISCONTINUED | OUTPATIENT
Start: 2025-04-08 | End: 2025-04-10 | Stop reason: HOSPADM

## 2025-04-08 RX ORDER — LOSARTAN POTASSIUM 50 MG/1
50 TABLET ORAL DAILY
Status: DISCONTINUED | OUTPATIENT
Start: 2025-04-08 | End: 2025-04-10 | Stop reason: HOSPADM

## 2025-04-08 RX ORDER — IBUPROFEN 200 MG
16 TABLET ORAL
Status: DISCONTINUED | OUTPATIENT
Start: 2025-04-08 | End: 2025-04-10 | Stop reason: HOSPADM

## 2025-04-08 RX ORDER — SODIUM CHLORIDE 0.9 % (FLUSH) 0.9 %
10 SYRINGE (ML) INJECTION
Status: DISCONTINUED | OUTPATIENT
Start: 2025-04-08 | End: 2025-04-10 | Stop reason: HOSPADM

## 2025-04-08 RX ORDER — METOPROLOL TARTRATE 50 MG/1
50 TABLET ORAL 2 TIMES DAILY
Status: DISCONTINUED | OUTPATIENT
Start: 2025-04-08 | End: 2025-04-10 | Stop reason: HOSPADM

## 2025-04-08 RX ORDER — NITROGLYCERIN 0.4 MG/1
0.4 TABLET SUBLINGUAL
Status: COMPLETED | OUTPATIENT
Start: 2025-04-08 | End: 2025-04-08

## 2025-04-08 RX ORDER — ISOSORBIDE DINITRATE 10 MG/1
20 TABLET ORAL 3 TIMES DAILY
Status: DISCONTINUED | OUTPATIENT
Start: 2025-04-08 | End: 2025-04-08

## 2025-04-08 RX ORDER — SPIRONOLACTONE 25 MG/1
25 TABLET ORAL 2 TIMES DAILY
Status: DISCONTINUED | OUTPATIENT
Start: 2025-04-08 | End: 2025-04-08

## 2025-04-08 RX ORDER — ACETAMINOPHEN 325 MG/1
650 TABLET ORAL EVERY 4 HOURS PRN
Status: DISCONTINUED | OUTPATIENT
Start: 2025-04-08 | End: 2025-04-10 | Stop reason: HOSPADM

## 2025-04-08 RX ORDER — ALUMINUM HYDROXIDE, MAGNESIUM HYDROXIDE, AND SIMETHICONE 1200; 120; 1200 MG/30ML; MG/30ML; MG/30ML
30 SUSPENSION ORAL 4 TIMES DAILY PRN
Status: DISCONTINUED | OUTPATIENT
Start: 2025-04-08 | End: 2025-04-10 | Stop reason: HOSPADM

## 2025-04-08 RX ORDER — NALOXONE HCL 0.4 MG/ML
0.02 VIAL (ML) INJECTION
Status: DISCONTINUED | OUTPATIENT
Start: 2025-04-08 | End: 2025-04-10 | Stop reason: HOSPADM

## 2025-04-08 RX ORDER — SERTRALINE HYDROCHLORIDE 50 MG/1
50 TABLET, FILM COATED ORAL DAILY
Status: DISCONTINUED | OUTPATIENT
Start: 2025-04-08 | End: 2025-04-10 | Stop reason: HOSPADM

## 2025-04-08 RX ORDER — FUROSEMIDE 10 MG/ML
40 INJECTION INTRAMUSCULAR; INTRAVENOUS
Status: COMPLETED | OUTPATIENT
Start: 2025-04-08 | End: 2025-04-08

## 2025-04-08 RX ORDER — ACETAMINOPHEN 325 MG/1
650 TABLET ORAL EVERY 8 HOURS PRN
Status: DISCONTINUED | OUTPATIENT
Start: 2025-04-08 | End: 2025-04-10 | Stop reason: HOSPADM

## 2025-04-08 RX ORDER — LANOLIN ALCOHOL/MO/W.PET/CERES
400 CREAM (GRAM) TOPICAL 2 TIMES DAILY
Status: DISCONTINUED | OUTPATIENT
Start: 2025-04-08 | End: 2025-04-10 | Stop reason: HOSPADM

## 2025-04-08 RX ADMIN — METOPROLOL TARTRATE 50 MG: 50 TABLET, FILM COATED ORAL at 08:04

## 2025-04-08 RX ADMIN — SPIRONOLACTONE 50 MG: 25 TABLET ORAL at 05:04

## 2025-04-08 RX ADMIN — SERTRALINE HYDROCHLORIDE 50 MG: 50 TABLET ORAL at 08:04

## 2025-04-08 RX ADMIN — POTASSIUM CHLORIDE 20 MEQ: 1500 TABLET, EXTENDED RELEASE ORAL at 08:04

## 2025-04-08 RX ADMIN — SENNOSIDES AND DOCUSATE SODIUM 1 TABLET: 50; 8.6 TABLET ORAL at 08:04

## 2025-04-08 RX ADMIN — Medication 400 MG: at 08:04

## 2025-04-08 RX ADMIN — FUROSEMIDE 40 MG: 10 INJECTION, SOLUTION INTRAVENOUS at 08:04

## 2025-04-08 RX ADMIN — LOSARTAN POTASSIUM 50 MG: 50 TABLET, FILM COATED ORAL at 01:04

## 2025-04-08 RX ADMIN — FUROSEMIDE 40 MG: 10 INJECTION, SOLUTION INTRAMUSCULAR; INTRAVENOUS at 08:04

## 2025-04-08 RX ADMIN — FUROSEMIDE 40 MG: 10 INJECTION, SOLUTION INTRAMUSCULAR; INTRAVENOUS at 02:04

## 2025-04-08 RX ADMIN — SPIRONOLACTONE 50 MG: 25 TABLET ORAL at 08:04

## 2025-04-08 RX ADMIN — NITROGLYCERIN 0.4 MG: 0.4 TABLET SUBLINGUAL at 02:04

## 2025-04-08 RX ADMIN — ISOSORBIDE DINITRATE 20 MG: 10 TABLET ORAL at 08:04

## 2025-04-08 RX ADMIN — ISOSORBIDE DINITRATE 20 MG: 10 TABLET ORAL at 05:04

## 2025-04-08 NOTE — SUBJECTIVE & OBJECTIVE
Past Medical History:   Diagnosis Date    CHF (congestive heart failure)     Hypertension        Past Surgical History:   Procedure Laterality Date    LEFT HEART CATHETERIZATION Left 3/14/2023    Procedure: Left heart cath;  Surgeon: Joey Sousa MD;  Location: [facility] CATH/EP LAB;  Service: Cardiology;  Laterality: Left;       Review of patient's allergies indicates:  No Known Allergies    No current facility-administered medications on file prior to encounter.     Current Outpatient Medications on File Prior to Encounter   Medication Sig    empagliflozin (JARDIANCE) 10 mg tablet Take 1 tablet (10 mg total) by mouth once daily.    metoprolol tartrate (LOPRESSOR) 25 MG tablet Take 2 tablets (50 mg total) by mouth 2 (two) times daily.    sacubitriL-valsartan (ENTRESTO) 24-26 mg per tablet Take 1 tablet by mouth 2 (two) times daily.    sertraline (ZOLOFT) 50 MG tablet Take 1 tablet (50 mg total) by mouth once daily.    spironolactone (ALDACTONE) 25 MG tablet Take 1 tablet (25 mg total) by mouth once daily.     Family History       Problem Relation (Age of Onset)    Cancer Father    Diabetes Mother    Drug abuse Maternal Aunt, Maternal Grandfather    Heart disease Mother, Maternal Grandmother    Hypertension Maternal Grandfather          Tobacco Use    Smoking status: Never    Smokeless tobacco: Never   Substance and Sexual Activity    Alcohol use: Not Currently    Drug use: Not Currently    Sexual activity: Not on file     Review of Systems   Psychiatric/Behavioral:  Positive for dysphoric mood.    All other systems reviewed and are negative.    Objective:     Vital Signs (Most Recent):  Temp: 98.6 °F (37 °C) (04/08/25 0148)  Pulse: 100 (04/08/25 0530)  Resp: 18 (04/08/25 0530)  BP: (!) 155/100 (04/08/25 0530)  SpO2: 95 % (04/08/25 0530) Vital Signs (24h Range):  Temp:  [98.6 °F (37 °C)] 98.6 °F (37 °C)  Pulse:  [] 100  Resp:  [18-22] 18  SpO2:  [93 %-95 %] 95 %  BP: (155-166)/() 155/100     Weight:  "135.6 kg (299 lb)  Body mass index is 45.46 kg/m².     Physical Exam  Vitals and nursing note reviewed.   Constitutional:       Appearance: Normal appearance.   HENT:      Head: Normocephalic.      Nose: Nose normal.      Mouth/Throat:      Mouth: Mucous membranes are moist.   Eyes:      Extraocular Movements: Extraocular movements intact.      Pupils: Pupils are equal, round, and reactive to light.   Cardiovascular:      Rate and Rhythm: Normal rate and regular rhythm.      Pulses: Normal pulses.      Heart sounds: Normal heart sounds.   Pulmonary:      Effort: Pulmonary effort is normal.      Breath sounds: Normal breath sounds.   Abdominal:      General: Abdomen is flat. Bowel sounds are normal.      Palpations: Abdomen is soft.   Musculoskeletal:         General: Normal range of motion.      Cervical back: Normal range of motion.   Skin:     General: Skin is warm.      Capillary Refill: Capillary refill takes less than 2 seconds.   Neurological:      General: No focal deficit present.      Mental Status: He is alert and oriented to person, place, and time.   Psychiatric:         Mood and Affect: Mood normal.         Behavior: Behavior normal.              CRANIAL NERVES     CN III, IV, VI   Pupils are equal, round, and reactive to light.       Significant Labs: All pertinent labs within the past 24 hours have been reviewed.  CBC:   Recent Labs   Lab 04/08/25 0211   WBC 13.71*   HGB 13.6*   HCT 39.1*        CMP:   Recent Labs   Lab 04/08/25 0212      K 3.8   CO2 23   BUN 16   CREATININE 1.3   CALCIUM 9.0   ALBUMIN 4.2   BILITOT 0.9   ALKPHOS 66   AST 23   ALT 29     Cardiac Markers:   Recent Labs   Lab 04/08/25 0211   *     Coagulation:   Recent Labs   Lab 04/08/25 0211   INR 1.0     Lactic Acid: No results for input(s): "LACTATE" in the last 48 hours.  Lipid Panel: No results for input(s): "CHOL", "HDL", "LDLCALC", "TRIG", "CHOLHDL" in the last 48 hours.  Magnesium:   Recent Labs   Lab " "04/08/25  0212   MG 1.9     Respiratory Culture: No results for input(s): "GSRESP", "RESPIRATORYC" in the last 48 hours.  Troponin: No results for input(s): "TROPONINI", "TROPONINIHS" in the last 48 hours.  TSH: No results for input(s): "TSH" in the last 4320 hours.  Urine Studies: No results for input(s): "COLORU", "APPEARANCEUA", "PHUR", "SPECGRAV", "PROTEINUA", "GLUCUA", "KETONESU", "BILIRUBINUA", "OCCULTUA", "NITRITE", "UROBILINOGEN", "LEUKOCYTESUR", "RBCUA", "WBCUA", "BACTERIA", "SQUAMEPITHEL", "HYALINECASTS" in the last 48 hours.    Invalid input(s): "WRIGHTSUR"    Significant Imaging: I have reviewed all pertinent imaging results/findings within the past 24 hours.  I have reviewed and interpreted all pertinent imaging results/findings within the past 24 hours.  "

## 2025-04-08 NOTE — CONSULTS
"Former[facility]  Department of Cardiology  Consult Note      PATIENT NAME: Wenceslao Jeong  MRN: [id number]  TODAY'S DATE: 04/08/2025  ADMIT DATE: 4/8/2025                          CONSULT REQUESTED BY: Sher Noriega, *    SUBJECTIVE     PRINCIPAL PROBLEM: Acute on chronic combined systolic and diastolic congestive heart failure      REASON FOR CONSULT:  CHF      HPI:      29 year old male patient admitted with SOB for the past 3 weeks admitted with CHF exacerbation with known NICM.  LVEF 20-25%.                FROM H AND P      Chest Pain        Patient c/o left sided chest pain    Cough    Shortness of Breath    Psychiatric Evaluation       Patient states "My life is fucking worthless"         HPI:   29-year-old male with history of hypertension, combined systolic and diastolic heart failure 9 TTE 4/2/25 LVEF 20-25% )  , morbid obesity, anemia, diabetes presents to the ER due to ongoing episodes of chest pain shortness of breath with dyspnea on exertion and orthopnea.  Patient reports that he has been sleeping in his recliner because he can not lay flat.  He is short of breath at rest and with minimal ambulation.  He reports this is gradually worsened but that has not significantly worse tonight.  He is intermittent chest pain.  Chronic dry cough.  Trace pitting edema he reports he has been compliant with his home medications in the past month.  That has unsure of what his medications are but reports he is taking Jardiance, Imdur and a diuretic though he is unsure what it is.  He is also very depressed with poor eye contact when asked suicide screening questions by triage he reported " my life is fucking worthless" so he was brought to a psychiatric room but patient reports he is not suicidal or homicidal but that has very depressed     Non smoker  Non drinker        In OUR ER     Labs were normal      BNP was in 400 range      CXR shows pulm edema      He was given IV Lasix and diuresed well    "            ECHO 4/2/2025    Left Ventricle: The left ventricle is moderately dilated. Normal wall thickness. Moderate global hypokinesis present. There is severely reduced systolic function with a visually estimated ejection fraction of 20 - 25%. There is normal diastolic function. E/A ratio is 1.90.    Right Ventricle: The right ventricle is normal in size. Systolic function is normal.    Mitral Valve: There is mild to moderate regurgitation.    Tricuspid Valve: There is mild to moderate regurgitation. There is mild pulmonary hypertension. The estimated PA systolic pressure is at least 38 mmHg.    Pulmonic Valve: There is mild regurgitation.           Hospital Course:  MARCH 2023 DC SUMMARY ON OUR SERVICE   Wenceslao Jeong is a 27 year old male with a past medical history of obesity and HTN who presented with shortness of breath, chest pain, and productive cough secondary to a new onset combined congestive heart failure in the setting of hypertensive emergency with demand ischemia. His blood pressure was able to be controlled with a nitrate infusion transitioned to PO medications. He symptoms also improved with IV diuretics. Cardiology was consulted. TTE showed EF of 25% with grade III diastolic dysfunction as well as MR and TR. He is currently stable on room air and off diuretics. Entresto, Aldactone, isosorbide mononitrate and metoprolol have been started. He underwent coronary angiogram 3/14 which showed no evidence of coronary artery disease. He needs a Life Vest on discharge; however, his insurance policy/coverage has decided that they will not cover the cost of this device. A peer to peer meeting took place with continued denial of the device; an appeal is to be instituted. He needs to follow up with his PCP and Cardiology and have a repeat TTE in three months.           Review of patient's allergies indicates:  No Known Allergies    Past Medical History:   Diagnosis Date    CHF (congestive heart failure)      Hypertension      Past Surgical History:   Procedure Laterality Date    LEFT HEART CATHETERIZATION Left 3/14/2023    Procedure: Left heart cath;  Surgeon: Joey Sousa MD;  Location: [facility] CATH/EP LAB;  Service: Cardiology;  Laterality: Left;     Social History[1]     REVIEW OF SYSTEMS    As mentioned in HPI    OBJECTIVE     VITAL SIGNS (Most Recent)  Temp: 98.5 °F (36.9 °C) (04/08/25 1114)  Pulse: 86 (04/08/25 1114)  Resp: 18 (04/08/25 1114)  BP: (!) 142/87 (04/08/25 1354)  SpO2: 95 % (04/08/25 1114)    VENTILATION STATUS  Resp: 18 (04/08/25 1114)  SpO2: 95 % (04/08/25 1114)           I & O (Last 24H):  Intake/Output Summary (Last 24 hours) at 4/8/2025 1505  Last data filed at 4/8/2025 0346  Gross per 24 hour   Intake --   Output 1750 ml   Net -1750 ml       WEIGHTS  Wt Readings from Last 3 Encounters:   04/08/25 0148 135.6 kg (299 lb)   04/02/25 1432 (!) 138.8 kg (306 lb)   03/26/25 1119 (!) 138.8 kg (306 lb)       PHYSICAL EXAM    CONSTITUTIONAL: Obese male in no distress  HEENT: Normocephalic. No pallor  NECK: no JVD  LUNGS: Diminished  HEART: regular rate and rhythm, S1, S2 normal, no murmur   ABDOMEN: soft, non-tender, bowel sounds normal  EXTREMITIES: mild edema  SKIN: No rash  NEURO: AAO X 3  PSYCH: normal affect      HOME MEDICATIONS:Medications Ordered Prior to Encounter[2]    SCHEDULED MEDS:   furosemide (LASIX) injection  40 mg Intravenous Q12H    losartan  50 mg Oral Daily    magnesium oxide  400 mg Oral BID    metoprolol tartrate  50 mg Oral BID    potassium chloride  20 mEq Oral BID    senna-docusate  1 tablet Oral BID    sertraline  50 mg Oral Daily    spironolactone  50 mg Oral BID       CONTINUOUS INFUSIONS:    PRN MEDS:  Current Facility-Administered Medications:     acetaminophen, 650 mg, Oral, Q8H PRN    acetaminophen, 650 mg, Oral, Q4H PRN    aluminum-magnesium hydroxide-simethicone, 30 mL, Oral, QID PRN    dextrose 50%, 12.5 g, Intravenous, PRN    dextrose 50%, 25 g, Intravenous, PRN     "glucagon (human recombinant), 1 mg, Intramuscular, PRN    glucose, 16 g, Oral, PRN    glucose, 24 g, Oral, PRN    magnesium oxide, 800 mg, Oral, PRN    magnesium oxide, 800 mg, Oral, PRN    melatonin, 6 mg, Oral, Nightly PRN    naloxone, 0.02 mg, Intravenous, PRN    ondansetron, 4 mg, Intravenous, Q6H PRN    potassium bicarbonate, 35 mEq, Oral, PRN    potassium bicarbonate, 50 mEq, Oral, PRN    potassium bicarbonate, 60 mEq, Oral, PRN    potassium, sodium phosphates, 2 packet, Oral, PRN    potassium, sodium phosphates, 2 packet, Oral, PRN    potassium, sodium phosphates, 2 packet, Oral, PRN    sodium chloride 0.9%, 10 mL, Intravenous, PRN    sodium chloride 0.9%, 3 mL, Intravenous, Q12H PRN    LABS AND DIAGNOSTICS     CBC LAST 3 DAYS  Recent Labs   Lab 04/08/25  0211   WBC 13.71*   RBC 4.99   HGB 13.6*   HCT 39.1*   MCV 78*   MCH 27.3   MCHC 34.8   RDW 15.4*      MPV 11.7   NRBC 0       COAGULATION LAST 3 DAYS  Recent Labs   Lab 04/08/25  0211   INR 1.0       CHEMISTRY LAST 3 DAYS  Recent Labs   Lab 04/08/25  0212      K 3.8   CO2 23   BUN 16   CREATININE 1.3   CALCIUM 9.0   MG 1.9   ALBUMIN 4.2   ALKPHOS 66   ALT 29   AST 23   BILITOT 0.9       CARDIAC PROFILE LAST 3 DAYS  Recent Labs   Lab 04/08/25  0211   *       ENDOCRINE LAST 3 DAYS  Recent Labs   Lab 04/08/25  0553   TSH 1.930       LAST ARTERIAL BLOOD GAS  ABG  No results for input(s): "PH", "PO2", "PCO2", "HCO3", "BE" in the last 168 hours.    LAST 7 DAYS MICROBIOLOGY   Microbiology Results (last 7 days)       ** No results found for the last 168 hours. **            MOST RECENT IMAGING  X-Ray Chest AP Portable  Narrative: EXAMINATION:  XR CHEST AP PORTABLE    CLINICAL HISTORY:  CHF;    TECHNIQUE:  Single frontal view of the chest was performed.    COMPARISON:  Chest radiograph March 11, 2025    FINDINGS:  Single portable chest radiograph is submitted.  There is a general pattern of accentuated attenuation consistent with soft tissue " attenuation associated with body habitus.  When accounting for position and technique and depth of inspiration the cardiomediastinal silhouette appears stable.    There is prominence of the pulmonary vascular.  There is peribronchial thickening.  There is bilateral perihilar infiltrate, bilateral pattern of pulmonary infiltrate, right greater than left may relate to pulmonary congestion/edema.    Minimal thickening along the minor fissure on the right may relate to minimal pleural fluid otherwise there is no evidence for significant pleural effusion and there is no evidence for pneumothorax.    The visualized osseous structures appear intact.  Impression: Radiographic findings as above.    Electronically signed by: Daniel Dewey  Date:    04/08/2025  Time:    02:48      ECHOCARDIOGRAM RESULTS (last 5)  Results for orders placed during the hospital encounter of 04/02/25    Echo    Interpretation Summary    Left Ventricle: The left ventricle is moderately dilated. Normal wall thickness. Moderate global hypokinesis present. There is severely reduced systolic function with a visually estimated ejection fraction of 20 - 25%. There is normal diastolic function. E/A ratio is 1.90.    Right Ventricle: The right ventricle is normal in size. Systolic function is normal.    Mitral Valve: There is mild to moderate regurgitation.    Tricuspid Valve: There is mild to moderate regurgitation. There is mild pulmonary hypertension. The estimated PA systolic pressure is at least 38 mmHg.    Pulmonic Valve: There is mild regurgitation.      Results for orders placed during the hospital encounter of 06/09/23    Echo    Interpretation Summary  · The left ventricle is moderately enlarged with moderately decreased systolic function.  · Grade III left ventricular diastolic dysfunction.  · The estimated ejection fraction is 32%.  · There is moderate left ventricular global hypokinesis.  · Normal right ventricular size with normal right  ventricular systolic function.  · Mild left atrial enlargement.  · Mild mitral regurgitation.  · Mild tricuspid regurgitation.  · Normal central venous pressure (3 mmHg).  · The estimated PA systolic pressure is 26 mmHg.      Results for orders placed during the hospital encounter of 03/07/23    Echo    Interpretation Summary  · The left ventricle is normal in size with mild eccentric hypertrophy and severely decreased systolic function.  · The estimated ejection fraction is 25%.  · Grade III left ventricular diastolic dysfunction.  · Normal right ventricular size with normal right ventricular systolic function.  · Mild right atrial enlargement.  · Moderate mitral regurgitation.  · Mild to moderate tricuspid regurgitation.  · Mild pulmonic regurgitation.  · Moderate left atrial enlargement.      CURRENT/PREVIOUS VISIT EKG  Results for orders placed or performed during the hospital encounter of 04/08/25   EKG 12-lead    Collection Time: 04/08/25  1:42 AM   Result Value Ref Range    QRS Duration 86 ms    [facility] QTC Calculation 492 ms    Narrative    Test Reason : R06.02,    Vent. Rate : 109 BPM     Atrial Rate : 109 BPM     P-R Int : 168 ms          QRS Dur :  86 ms      QT Int : 366 ms       P-R-T Axes :  53  66  26 degrees    QTcB Int : 492 ms    Sinus tachycardia  Possible Left atrial enlargement  Borderline Abnormal ECG  No previous ECGs available    Referred By: AAAREFERRAL SELF           Confirmed By:            ASSESSMENT/PLAN:     Active Hospital Problems    Diagnosis    *Acute on chronic combined systolic and diastolic congestive heart failure    Depression    HTN (hypertension)       ASSESSMENT & PLAN:     Acute on Chronic CHF  NICM LVEF 20-25%  HTN  Depression  Tachycardia      RECOMMENDATIONS:      Continue to diurese, change to 40 mg BID IV  Add Losartan 50 mg daily  Continue metoprolol 50 BID  Continue Aldactone 50 mg PO BID  Will follow        Shweta Byrd, FÉLIX-ACNP-BC, CVNP-BC  Department of  Cardiology  Date of Service: 04/08/2025               [1]   Social History  Tobacco Use    Smoking status: Never    Smokeless tobacco: Never   Substance Use Topics    Alcohol use: Not Currently    Drug use: Not Currently   [2]   No current facility-administered medications on file prior to encounter.     Current Outpatient Medications on File Prior to Encounter   Medication Sig Dispense Refill    empagliflozin (JARDIANCE) 10 mg tablet Take 1 tablet (10 mg total) by mouth once daily. 90 tablet 3    metoprolol tartrate (LOPRESSOR) 25 MG tablet Take 2 tablets (50 mg total) by mouth 2 (two) times daily. 180 tablet 3    spironolactone (ALDACTONE) 25 MG tablet Take 1 tablet (25 mg total) by mouth once daily. 90 tablet 3    sacubitriL-valsartan (ENTRESTO) 24-26 mg per tablet Take 1 tablet by mouth 2 (two) times daily. (Patient not taking: Reported on 4/8/2025) 180 tablet 3    [DISCONTINUED] sertraline (ZOLOFT) 50 MG tablet Take 1 tablet (50 mg total) by mouth once daily. 30 tablet 11

## 2025-04-08 NOTE — PLAN OF CARE
SW met with patient at bedside. Patient reported he would like resources for mental health therapy and medication assistance. SW provided patient with a list of mental health providers in the [facility]. SW provided patient with coupon printouts for his Entresto.      04/08/25 0927   Post-Acute Status   Post-Acute Authorization Other   Coverage [employer]   Other Status Community Services   Discharge Delays None known at this time   Discharge Plan   Discharge Plan A Community Services   Discharge Plan B Home

## 2025-04-08 NOTE — ASSESSMENT & PLAN NOTE
He has non ischemic cardiomyopathy 20% EF      He has been OFF His medicines for  awhile he says.     A month at least.        Denies swelling or weight gain     Only SOB on exertion and can't lay flat now         Recent Labs     04/08/25  0211   *     Latest ECHO  Results for orders placed during the hospital encounter of 04/02/25    Echo    Interpretation Summary    Left Ventricle: The left ventricle is moderately dilated. Normal wall thickness. Moderate global hypokinesis present. There is severely reduced systolic function with a visually estimated ejection fraction of 20 - 25%. There is normal diastolic function. E/A ratio is 1.90.    Right Ventricle: The right ventricle is normal in size. Systolic function is normal.    Mitral Valve: There is mild to moderate regurgitation.    Tricuspid Valve: There is mild to moderate regurgitation. There is mild pulmonary hypertension. The estimated PA systolic pressure is at least 38 mmHg.    Pulmonic Valve: There is mild regurgitation.    Current Heart Failure Medications  furosemide injection 40 mg, 3 times daily, Intravenous  spironolactone tablet 50 mg, 2 times daily, Oral  isosorbide dinitrate tablet 20 mg, 3 times daily, Oral    Plan  - Monitor strict I&Os and daily weights.    - Place on telemetry  - Reg diet  - IV Lasix 40 TID  - aldactone 50 mg po daily   - isordil 20 mg po TID     Will need to make sure has good HTN BP regimen for home      I think DEPRESSION is a major issue that is causing him to not take his meds

## 2025-04-08 NOTE — CARE UPDATE
04/08/25 0723   Patient Assessment/Suction   Level of Consciousness (AVPU) alert   Respiratory Effort Normal;Unlabored   Expansion/Accessory Muscles/Retractions no retractions;expansion symmetric;no use of accessory muscles   PRE-TX-O2   Device (Oxygen Therapy) nasal cannula   $ Is the patient on Low Flow Oxygen? Yes   Flow (L/min) (Oxygen Therapy) 2   SpO2 98 %   Pulse Oximetry Type Intermittent   $ Pulse Oximetry - Multiple Charge Pulse Oximetry - Multiple   Education   $ Education Oxygen;15 min   Respiratory Evaluation   $ Care Plan Tech Time 15 min   $ Respiratory Evaluation Complete   Evaluation For New Orders   Admitting Diagnosis chf   Cardiac Diagnosis chf   Pulmonary Diagnosis n/a   Current Surgeries n/a   Home Oxygen   Has Home Oxygen? No   Home Aerosol, MDI, DPI, and Other Treatments/Therapies   Home Respiratory Therapy Per Patient/Review of Chart No   Oxygen Care Plan   Oxygen Care Plan Per Protocol   SPO2 Goal (%) 92% non-cardiac   Rationale SpO2 is <92% (Non-cardiac Pt.)   Bronchodilator Care Plan   Rationale No Rationale found   Atelectasis Care Plan   Rationale No Rational Found   Airway Clearance Care Plan   Rationale No rationale found

## 2025-04-08 NOTE — CONSULTS
[facility]  Adult Nutrition  Education Short Note      Nutrition Education    Previous education: no    Diet at home: regular    Written information provided and explained on cardiac diet, low fat, low cholesterol diet, fluid restriction: 1500 mL, and 2 gram sodium diet diet.     Discussed with: patient    Educational Need? yes    Barriers: none identified    Interventions: Fat modified diet, Cholesterol modified diet, Fluid modified diet, and Sodium modified diet    Patient and/or family comprehend instructions: yes    Outcome: Verbalizes understanding     Thanks for the consult!    Karo Ruth RD 04/08/2025 4:48 PM

## 2025-04-08 NOTE — ASSESSMENT & PLAN NOTE
Patient's blood pressure range in the last 24 hours was: BP  Min: 155/100  Max: 166/107.The patient's inpatient anti-hypertensive regimen is listed below:  Current Antihypertensives  furosemide injection 40 mg, 3 times daily, Intravenous  spironolactone tablet 50 mg, 2 times daily, Oral  isosorbide dinitrate tablet 20 mg, 3 times daily, Oral    Plan  - IV lasix TID for now  -Aldactone 50 mg PO Daily   - Isordil 20 mg PO TID         But will  need to go home on HTN BP Meds      Needs an ARB ,  toprol XL  , Lasix    and might need more also like IMDUR  or CCB

## 2025-04-08 NOTE — ASSESSMENT & PLAN NOTE
See HPI       PLAN    - I continued the SSRI that was on his med rec      - I'm checking B12, Folate, TSH, Free T4 and Vitamin D levels now

## 2025-04-08 NOTE — ED PROVIDER NOTES
"Encounter Date: 4/8/2025       History     Chief Complaint   Patient presents with    Chest Pain     Patient c/o left sided chest pain    Cough    Shortness of Breath    Psychiatric Evaluation     Patient states "My life is fucking worthless"     Emergent evaluation 29-year-old male with history of hypertension, combined systolic and diastolic heart failure 9 TTE 4/2/25 LVEF 20-25% )  , morbid obesity, anemia, diabetes presents to the ER due to ongoing episodes of chest pain shortness of breath with dyspnea on exertion and orthopnea.  Patient reports that he has been sleeping in his recliner because he can not lay flat.  He is short of breath at rest and with minimal ambulation.  He reports this is gradually worsened but that has not significantly worse tonight.  He is intermittent chest pain.  Chronic dry cough.  Trace pitting edema he reports he has been compliant with his home medications in the past month.  That has unsure of what his medications are but reports he is taking Jardiance, Imdur and a diuretic though he is unsure what it is.  He is also very depressed with poor eye contact when asked suicide screening questions by triage he reported " my life is fucking worthless" so he was brought to a psychiatric room but patient reports he is not suicidal or homicidal but that has very depressed               Review of patient's allergies indicates:  No Known Allergies  Past Medical History:   Diagnosis Date    CHF (congestive heart failure)     Hypertension      Past Surgical History:   Procedure Laterality Date    LEFT HEART CATHETERIZATION Left 3/14/2023    Procedure: Left heart cath;  Surgeon: Joey Sousa MD;  Location: [facility] CATH/EP LAB;  Service: Cardiology;  Laterality: Left;     Family History   Problem Relation Name Age of Onset    Diabetes Mother      Heart disease Mother      Cancer Father      Drug abuse Maternal Aunt      Heart disease Maternal Grandmother      Drug abuse Maternal Grandfather "      Hypertension Maternal Grandfather       Social History[1]  Review of Systems   Constitutional:  Positive for fatigue. Negative for activity change, appetite change, chills, diaphoresis and fever.   Respiratory:  Positive for shortness of breath. Negative for cough, chest tightness and wheezing.    Cardiovascular:  Positive for chest pain, palpitations and leg swelling.   Gastrointestinal:  Negative for abdominal pain, constipation, diarrhea, nausea and vomiting.   Musculoskeletal:  Negative for neck pain and neck stiffness.   Neurological:  Negative for dizziness, weakness, light-headedness, numbness and headaches.   Psychiatric/Behavioral:  Positive for decreased concentration, dysphoric mood and sleep disturbance. Negative for agitation, behavioral problems, confusion and suicidal ideas. The patient is not nervous/anxious.    All other systems reviewed and are negative.      Physical Exam     Initial Vitals [04/08/25 0148]   BP Pulse Resp Temp SpO2   (!) 162/106 108 (!) 22 98.6 °F (37 °C) (!) 93 %      MAP       --         Physical Exam    Nursing note and vitals reviewed.  Constitutional: He appears well-developed and well-nourished. He is not diaphoretic. No distress.   HENT:   Head: Normocephalic and atraumatic.   Right Ear: External ear normal.   Left Ear: External ear normal.   Nose: Nose normal. Mouth/Throat: Oropharynx is clear and moist.   Eyes: Conjunctivae and EOM are normal. Pupils are equal, round, and reactive to light.   Neck: Neck supple. No tracheal deviation present.   Normal range of motion.  Cardiovascular:  Regular rhythm, normal heart sounds and intact distal pulses.     Exam reveals no gallop and no friction rub.       No murmur heard.  162/106 pulse 108   Pulmonary/Chest: Breath sounds normal. No stridor. He is in respiratory distress. He has no wheezes. He has no rhonchi. He has no rales. He exhibits no tenderness.   93% on room air respirations 22 decreased breath sounds throughout no  rales or rhonchi heard   Abdominal: Abdomen is soft. Bowel sounds are normal. He exhibits no distension and no mass. There is no abdominal tenderness. There is no rebound and no guarding.   Musculoskeletal:         General: Edema present. Normal range of motion.      Cervical back: Normal range of motion and neck supple.      Comments: 1+ pitting edema bilateral lower extremities     Neurological: He is alert and oriented to person, place, and time. He has normal strength. No cranial nerve deficit or sensory deficit.   Skin: Skin is warm and dry. No rash noted. No erythema. No pallor.   Psychiatric: Judgment normal. His speech is delayed. He is slowed and withdrawn. He is not actively hallucinating. Thought content is not paranoid and not delusional. Cognition and memory are not impaired. He does not express impulsivity or inappropriate judgment. He exhibits a depressed mood. He expresses no homicidal and no suicidal ideation. He expresses no suicidal plans and no homicidal plans.   Very depressed poor eye contact  He is attentive.         ED Course   Procedures  Labs Reviewed   COMPREHENSIVE METABOLIC PANEL - Abnormal       Result Value    Sodium 138      Potassium 3.8      Chloride 106      CO2 23      Glucose 135 (*)     BUN 16      Creatinine 1.3      Calcium 9.0      Protein Total 7.5      Albumin 4.2      Bilirubin Total 0.9      ALP 66      AST 23      ALT 29      Anion Gap 9      eGFR >60     TROPONIN I HIGH SENSITIVITY - Abnormal    Troponin High Sensitive 18.7 (*)    B-TYPE NATRIURETIC PEPTIDE - Abnormal     (*)    CBC WITH DIFFERENTIAL - Abnormal    WBC 13.71 (*)     RBC 4.99      Hgb 13.6 (*)     Hct 39.1 (*)     MCV 78 (*)     MCH 27.3      MCHC 34.8      RDW 15.4 (*)     Platelet Count 272      MPV 11.7      Nucleated RBC 0      Neut % 64.1      Lymph % 27.5      Mono % 6.9      Eos % 0.7      Basophil % 0.4      Imm Grans % 0.4      Neut # 8.8 (*)     Lymph # 3.77      Mono # 0.95      Eos #  0.09      Baso # 0.06      Imm Grans # 0.05 (*)    PROTIME-INR - Normal    PT 11.5      INR 1.0     MAGNESIUM - Normal    Magnesium 1.9     CBC W/ AUTO DIFFERENTIAL    Narrative:     The following orders were created for panel order CBC auto differential.  Procedure                               Abnormality         Status                     ---------                               -----------         ------                     CBC with Differential[[id number]]       Abnormal            Final result                 Please view results for these tests on the individual orders.          Imaging Results              X-Ray Chest AP Portable (Final result)  Result time 04/08/25 02:48:14      Final result by Daniel Dewey MD (04/08/25 02:48:14)                   Impression:      Radiographic findings as above.      Electronically signed by: Daniel Dewey  Date:    04/08/2025  Time:    02:48               Narrative:    EXAMINATION:  XR CHEST AP PORTABLE    CLINICAL HISTORY:  CHF;    TECHNIQUE:  Single frontal view of the chest was performed.    COMPARISON:  Chest radiograph March 11, 2025    FINDINGS:  Single portable chest radiograph is submitted.  There is a general pattern of accentuated attenuation consistent with soft tissue attenuation associated with body habitus.  When accounting for position and technique and depth of inspiration the cardiomediastinal silhouette appears stable.    There is prominence of the pulmonary vascular.  There is peribronchial thickening.  There is bilateral perihilar infiltrate, bilateral pattern of pulmonary infiltrate, right greater than left may relate to pulmonary congestion/edema.    Minimal thickening along the minor fissure on the right may relate to minimal pleural fluid otherwise there is no evidence for significant pleural effusion and there is no evidence for pneumothorax.    The visualized osseous structures appear intact.                                    X-Rays:  "  Independently Interpreted Readings:   Chest X-Ray: Increased vascular markings consistent with CHF are present.  Cardiomegaly present.     Medications   furosemide injection 40 mg (40 mg Intravenous Given 4/8/25 0223)   nitroGLYCERIN SL tablet 0.4 mg (0.4 mg Sublingual Given 4/8/25 0223)     Medical Decision Making  Emergent evaluation 29-year-old male with history of hypertension, combined systolic and diastolic heart failure 9 TTE 4/2/25 LVEF 20-25% )  , morbid obesity, anemia, diabetes presents to the ER due to ongoing episodes of chest pain shortness of breath with dyspnea on exertion and orthopnea.  Patient reports that he has been sleeping in his recliner because he can not lay flat.  He is short of breath at rest and with minimal ambulation.  He reports this is gradually worsened but that has not significantly worse tonight.  He is intermittent chest pain.  Chronic dry cough.  Trace pitting edema he reports he has been compliant with his home medications in the past month.  That has unsure of what his medications are but reports he is taking Jardiance, Imdur and a diuretic though he is unsure what it is.  He is also very depressed with poor eye contact when asked suicide screening questions by triage he reported " my life is fucking worthless" so he was brought to a psychiatric room but patient reports he is not suicidal or homicidal but that has very depressed  On exam patient is short of breath at rest in bed sitting upright.  Sats are 93% he was placed on 2 L nasal cannula sats are 95% respirations 20 decreased breath sounds throughout.  Blood pressure once he has 2 of 106 pulse 108 very depressed but not suicidal trace edema in the lower extremities soft nontender abdomen  MDM    Patient presents for emergent evaluation of acute shortness of breath chest pain peripheral edema depression that poses a possible threat to life and/or bodily function.    Differential diagnosis includes but is not limited to " CHF exacerbation pulmonary edema cardiac dysrhythmia ACS myocarditis endocarditis pericarditis pericardial effusion tamponade, suicidal ideations acute psychosis severe depression.  In the ED patient found to have acute heart failure exacerbation with pulmonary edema  I ordered labs and personally reviewed them.  Labs significant for see below     I ordered X-rays and personally reviewed them and reviewed the radiologist interpretation.  Xray significant for see above.  I ordered EKG and personally reviewed it.  EKG significant for see above.       Admission MDM  I discussed the patient presentation labs, ekg, X-rays findings with the Hospitalist   Patient was managed in the ED with IV Lasix 40 mg patient has diuresed 1.9 liters, nitro 0.4 mg sublingual    The response to treatment was good  Patient required emergent consultation to hospitalist for admission.  Zainab Guerra M.D.       Amount and/or Complexity of Data Reviewed  External Data Reviewed: labs, radiology, ECG and notes.     Details: TTE 4/2/25    ·  Left Ventricle: The left ventricle is moderately dilated. Normal wall thickness. Moderate global hypokinesis present. There is severely reduced systolic function with a visually estimated ejection fraction of 20 - 25%. There is normal diastolic function. E/A ratio is 1.90.  ·  Right Ventricle: The right ventricle is normal in size. Systolic function is normal.  ·  Mitral Valve: There is mild to moderate regurgitation.  ·  Tricuspid Valve: There is mild to moderate regurgitation. There is mild pulmonary hypertension. The estimated PA systolic pressure is at least 38 mmHg.  ·  Pulmonic Valve: There is mild regurgitation.    Labs: ordered. Decision-making details documented in ED Course.  Radiology: ordered and independent interpretation performed. Decision-making details documented in ED Course.  ECG/medicine tests: ordered and independent interpretation performed. Decision-making details documented in ED  Course.    Risk  Prescription drug management.  Decision regarding hospitalization.              Attending Attestation:         Attending Critical Care:   Critical Care Times:   Direct Patient Care (initial evaluation, reassessments, and time considering the case)................................................................10 minutes.   Additional History from reviewing old medical records or taking additional history from the family, EMS, PCP, etc.......................5 minutes.   Ordering, Reviewing, and Interpreting Diagnostic Studies...............................................................................................................5 minutes.   Documentation..................................................................................................................................................................................10 minutes.   Consultation with other Physicians. .................................................................................................................................................5 minutes.   Consultation with the patient's family directly relating to the patient's condition, care, and DNR status (when patient unable)......5 minutes.   ==============================================================  Total Critical Care Time - exclusive of procedural time: 40 minutes.  ==============================================================          ED Course as of 04/08/25 0346   Tue Apr 08, 2025   0144 I independently interpreted and reviewed EKG sinus tach rate 109 no ectopy or conduction delay no ischemic changes possible left atrial enlargement  UNCHANGED COMPARED TO MARCH 11, 2025 [RM]   0311 Troponin 18.7    White count 13.71 H&H 13.6 and 39.1 MCV 78  Normal CMP Mag 1.9 normal coags [RM]      ED Course User Index  [RM] Zainab Guerra MD                           Clinical Impression:  Final diagnoses:  [R06.02] Shortness of breath  [I50.43] Acute  on chronic combined systolic and diastolic congestive heart failure (Primary)  [J81.0] Acute pulmonary edema          ED Disposition Condition    Admit Stable                  [1]   Social History  Tobacco Use    Smoking status: Never    Smokeless tobacco: Never   Substance Use Topics    Alcohol use: Not Currently    Drug use: Not Currently        Zainab Guerra MD  04/08/25 0346

## 2025-04-08 NOTE — PLAN OF CARE
[facility]  Initial Discharge Assessment       Primary Care Provider: Aida Snow NP    Admission Diagnosis: Acute on chronic combined systolic and diastolic congestive heart failure [I50.43]    Admission Date: 4/8/2025  Expected Discharge Date: 4/10/2025    SW met with patient bedside. SW verified demographics, insurance, supports, and PCP.  Patient reported he lives in the home with his older brother and drives self to appointments. SW assessed patient's needs. Patient is able to complete ADLs independently. Patient verified at home DME: none.  Patient verified No HH, No Dialysis, No Blood Thinners, and No Oxygen.     Patient verified pharmacy of choice: [facility] in [place]  Patient confirmed he will be source of transportation at the time of discharge.       Transition of Care Barriers: None    Payor: [employer] / Plan: [facility] SELECT PLUS / Product Type: Commercial /     Extended Emergency Contact Information  Primary Emergency Contact: Love Avila  Mobile Phone: [phone]  Relation: Other  Preferred language: English   needed? No  Secondary Emergency Contact: Fady Pedraza  Home Phone: [phone]  Mobile Phone: [phone]  Relation: Other  Preferred language: English   needed? No    Discharge Plan A: Home  Discharge Plan B: Home      [id number] - [place], [place] - [address] AT [facility] [address] [facility] [address]  [address]  [facility] [address]  Phone: [phone] Fax: [phone]    [facility] - [place], [place] - [address]  [address]  [facility] [address]  Phone: [phone] Fax: [phone]    [facility]  [address]  [facility] [address]  Phone: [phone] Fax: [phone]      Initial Assessment (most recent)       Adult Discharge Assessment - 04/08/25 0993          Discharge Assessment    Assessment Type Discharge Planning Assessment     Confirmed/corrected address, phone  number and insurance Yes     Confirmed Demographics Correct on Facesheet     Source of Information patient     When was your last doctors appointment? --   March 2025    Communicated CAROLINE with patient/caregiver Date not available/Unable to determine     Reason For Admission Acute on chronic combined systolic and diastolic congestive heart failure     People in Home sibling(s)     Do you expect to return to your current living situation? Yes     Do you have help at home or someone to help you manage your care at home? No     Prior to hospitilization cognitive status: Unable to Assess     Current cognitive status: Alert/Oriented     Walking or Climbing Stairs Difficulty no     Dressing/Bathing Difficulty no     Home Accessibility wheelchair accessible     Home Layout Able to live on 1st floor     Equipment Currently Used at Home none     Readmission within 30 days? No     Patient currently being followed by outpatient case management? No     Do you currently have service(s) that help you manage your care at home? No     Do you take prescription medications? Yes     Do you have prescription coverage? Yes     Coverage [employer]     Do you have any problems affording any of your prescribed medications? Yes     If yes, what medications? Entresto     Is the patient taking medications as prescribed? yes     Who is going to help you get home at discharge? self     How do you get to doctors appointments? car, drives self     Are you on dialysis? No     Do you take coumadin? No     Discharge Plan A Home     Discharge Plan B Home     DME Needed Upon Discharge  none     Discharge Plan discussed with: Patient     Transition of Care Barriers None

## 2025-04-08 NOTE — CARE UPDATE
Patient seen during morning rounds.  Nocturnist note reviewed, agree with assessment and plan.  Patient requests sleep study on outpatient basis, also states has been unable to afford his Entresto that was prescribed by Cardiology.  He would like to see Cardiology again, considering his cardiac history this is not inappropriate.  Cardiology was consulted this morning.  Follow up recommendations.  Case management involved for discharge planning.    Mario Rutherford NP  04/08/2025  9:24 AM

## 2025-04-08 NOTE — PLAN OF CARE
Problem: Adult Inpatient Plan of Care  Goal: Plan of Care Review  Outcome: Progressing  Goal: Patient-Specific Goal (Individualized)  Outcome: Progressing  Goal: Absence of Hospital-Acquired Illness or Injury  Outcome: Progressing  Goal: Optimal Comfort and Wellbeing  Outcome: Progressing  Goal: Readiness for Transition of Care  Outcome: Progressing     Problem: Bariatric Environmental Safety  Goal: Safety Maintained with Care  Outcome: Progressing     Problem: Suicide Risk  Goal: Absence of Self-Harm  Outcome: Progressing

## 2025-04-08 NOTE — H&P
"  [facility] - Emergency Dept  Hospital Medicine  History & Physical    Patient Name: Wenceslao Jeong  MRN: [id number]  Patient Class: OP- Observation  Admission Date: 4/8/2025  Attending Physician: Koby Brown MD  Primary Care Provider: Aida Snow NP         Patient information was obtained from patient and ER records.     Subjective:     Principal Problem:Acute on chronic combined systolic and diastolic congestive heart failure    Chief Complaint:   Chief Complaint   Patient presents with    Chest Pain     Patient c/o left sided chest pain    Cough    Shortness of Breath    Psychiatric Evaluation     Patient states "My life is fucking worthless"        HPI:   29-year-old male with history of hypertension, combined systolic and diastolic heart failure 9 TTE 4/2/25 LVEF 20-25% )  , morbid obesity, anemia, diabetes presents to the ER due to ongoing episodes of chest pain shortness of breath with dyspnea on exertion and orthopnea.  Patient reports that he has been sleeping in his recliner because he can not lay flat.  He is short of breath at rest and with minimal ambulation.  He reports this is gradually worsened but that has not significantly worse tonight.  He is intermittent chest pain.  Chronic dry cough.  Trace pitting edema he reports he has been compliant with his home medications in the past month.  That has unsure of what his medications are but reports he is taking Jardiance, Imdur and a diuretic though he is unsure what it is.  He is also very depressed with poor eye contact when asked suicide screening questions by triage he reported " my life is fucking worthless" so he was brought to a psychiatric room but patient reports he is not suicidal or homicidal but that has very depressed    Non smoker  Non drinker      In [facility] ER    Labs were normal     BNP was in 400 range     CXR shows pulm edema     He was given IV Lasix and diuresed well           ECHO 4/2/2025    Left Ventricle: The left " ventricle is moderately dilated. Normal wall thickness. Moderate global hypokinesis present. There is severely reduced systolic function with a visually estimated ejection fraction of 20 - 25%. There is normal diastolic function. E/A ratio is 1.90.    Right Ventricle: The right ventricle is normal in size. Systolic function is normal.    Mitral Valve: There is mild to moderate regurgitation.    Tricuspid Valve: There is mild to moderate regurgitation. There is mild pulmonary hypertension. The estimated PA systolic pressure is at least 38 mmHg.    Pulmonic Valve: There is mild regurgitation.        Hospital Course:  MARCH 2023 DC SUMMARY ON OUR SERVICE   Wenceslao Jeong is a 27 year old male with a past medical history of obesity and HTN who presented with shortness of breath, chest pain, and productive cough secondary to a new onset combined congestive heart failure in the setting of hypertensive emergency with demand ischemia. His blood pressure was able to be controlled with a nitrate infusion transitioned to PO medications. He symptoms also improved with IV diuretics. Cardiology was consulted. TTE showed EF of 25% with grade III diastolic dysfunction as well as MR and TR. He is currently stable on room air and off diuretics. Entresto, Aldactone, isosorbide mononitrate and metoprolol have been started. He underwent coronary angiogram 3/14 which showed no evidence of coronary artery disease. He needs a Life Vest on discharge; however, his insurance policy/coverage has decided that they will not cover the cost of this device. A peer to peer meeting took place with continued denial of the device; an appeal is to be instituted. He needs to follow up with his PCP and Cardiology and have a repeat TTE in three months.    Past Medical History:   Diagnosis Date    CHF (congestive heart failure)     Hypertension        Past Surgical History:   Procedure Laterality Date    LEFT HEART CATHETERIZATION Left 3/14/2023     Procedure: Left heart cath;  Surgeon: Joey Sousa MD;  Location: [facility] CATH/EP LAB;  Service: Cardiology;  Laterality: Left;       Review of patient's allergies indicates:  No Known Allergies    No current facility-administered medications on file prior to encounter.     Current Outpatient Medications on File Prior to Encounter   Medication Sig    empagliflozin (JARDIANCE) 10 mg tablet Take 1 tablet (10 mg total) by mouth once daily.    metoprolol tartrate (LOPRESSOR) 25 MG tablet Take 2 tablets (50 mg total) by mouth 2 (two) times daily.    sacubitriL-valsartan (ENTRESTO) 24-26 mg per tablet Take 1 tablet by mouth 2 (two) times daily.    sertraline (ZOLOFT) 50 MG tablet Take 1 tablet (50 mg total) by mouth once daily.    spironolactone (ALDACTONE) 25 MG tablet Take 1 tablet (25 mg total) by mouth once daily.     Family History       Problem Relation (Age of Onset)    Cancer Father    Diabetes Mother    Drug abuse Maternal Aunt, Maternal Grandfather    Heart disease Mother, Maternal Grandmother    Hypertension Maternal Grandfather          Tobacco Use    Smoking status: Never    Smokeless tobacco: Never   Substance and Sexual Activity    Alcohol use: Not Currently    Drug use: Not Currently    Sexual activity: Not on file     Review of Systems   Psychiatric/Behavioral:  Positive for dysphoric mood.    All other systems reviewed and are negative.    Objective:     Vital Signs (Most Recent):  Temp: 98.6 °F (37 °C) (04/08/25 0148)  Pulse: 100 (04/08/25 0530)  Resp: 18 (04/08/25 0530)  BP: (!) 155/100 (04/08/25 0530)  SpO2: 95 % (04/08/25 0530) Vital Signs (24h Range):  Temp:  [98.6 °F (37 °C)] 98.6 °F (37 °C)  Pulse:  [] 100  Resp:  [18-22] 18  SpO2:  [93 %-95 %] 95 %  BP: (155-166)/() 155/100     Weight: 135.6 kg (299 lb)  Body mass index is 45.46 kg/m².     Physical Exam  Vitals and nursing note reviewed.   Constitutional:       Appearance: Normal appearance.   HENT:      Head: Normocephalic.       "Nose: Nose normal.      Mouth/Throat:      Mouth: Mucous membranes are moist.   Eyes:      Extraocular Movements: Extraocular movements intact.      Pupils: Pupils are equal, round, and reactive to light.   Cardiovascular:      Rate and Rhythm: Normal rate and regular rhythm.      Pulses: Normal pulses.      Heart sounds: Normal heart sounds.   Pulmonary:      Effort: Pulmonary effort is normal.      Breath sounds: Normal breath sounds.   Abdominal:      General: Abdomen is flat. Bowel sounds are normal.      Palpations: Abdomen is soft.   Musculoskeletal:         General: Normal range of motion.      Cervical back: Normal range of motion.   Skin:     General: Skin is warm.      Capillary Refill: Capillary refill takes less than 2 seconds.   Neurological:      General: No focal deficit present.      Mental Status: He is alert and oriented to person, place, and time.   Psychiatric:         Mood and Affect: Mood normal.         Behavior: Behavior normal.              CRANIAL NERVES     CN III, IV, VI   Pupils are equal, round, and reactive to light.       Significant Labs: All pertinent labs within the past 24 hours have been reviewed.  CBC:   Recent Labs   Lab 04/08/25 0211   WBC 13.71*   HGB 13.6*   HCT 39.1*        CMP:   Recent Labs   Lab 04/08/25 0212      K 3.8   CO2 23   BUN 16   CREATININE 1.3   CALCIUM 9.0   ALBUMIN 4.2   BILITOT 0.9   ALKPHOS 66   AST 23   ALT 29     Cardiac Markers:   Recent Labs   Lab 04/08/25 0211   *     Coagulation:   Recent Labs   Lab 04/08/25 0211   INR 1.0     Lactic Acid: No results for input(s): "LACTATE" in the last 48 hours.  Lipid Panel: No results for input(s): "CHOL", "HDL", "LDLCALC", "TRIG", "CHOLHDL" in the last 48 hours.  Magnesium:   Recent Labs   Lab 04/08/25 0212   MG 1.9     Respiratory Culture: No results for input(s): "GSRESP", "RESPIRATORYC" in the last 48 hours.  Troponin: No results for input(s): "TROPONINI", "TROPONINIHS" in the last 48 " "hours.  TSH: No results for input(s): "TSH" in the last 4320 hours.  Urine Studies: No results for input(s): "COLORU", "APPEARANCEUA", "PHUR", "SPECGRAV", "PROTEINUA", "GLUCUA", "KETONESU", "BILIRUBINUA", "OCCULTUA", "NITRITE", "UROBILINOGEN", "LEUKOCYTESUR", "RBCUA", "WBCUA", "BACTERIA", "SQUAMEPITHEL", "HYALINECASTS" in the last 48 hours.    Invalid input(s): "WRIGHTSUR"    Significant Imaging: I have reviewed all pertinent imaging results/findings within the past 24 hours.  I have reviewed and interpreted all pertinent imaging results/findings within the past 24 hours.  Assessment/Plan:     Assessment & Plan  Acute on chronic combined systolic and diastolic congestive heart failure  He has non ischemic cardiomyopathy 20% EF      He has been OFF His medicines for  awhile he says.     A month at least.        Denies swelling or weight gain     Only SOB on exertion and can't lay flat now         Recent Labs     04/08/25  0211   *     Latest ECHO  Results for orders placed during the hospital encounter of 04/02/25    Echo    Interpretation Summary    Left Ventricle: The left ventricle is moderately dilated. Normal wall thickness. Moderate global hypokinesis present. There is severely reduced systolic function with a visually estimated ejection fraction of 20 - 25%. There is normal diastolic function. E/A ratio is 1.90.    Right Ventricle: The right ventricle is normal in size. Systolic function is normal.    Mitral Valve: There is mild to moderate regurgitation.    Tricuspid Valve: There is mild to moderate regurgitation. There is mild pulmonary hypertension. The estimated PA systolic pressure is at least 38 mmHg.    Pulmonic Valve: There is mild regurgitation.    Current Heart Failure Medications  furosemide injection 40 mg, 3 times daily, Intravenous  spironolactone tablet 50 mg, 2 times daily, Oral  isosorbide dinitrate tablet 20 mg, 3 times daily, Oral    Plan  - Monitor strict I&Os and daily weights.    - " Place on telemetry  - Reg diet  - IV Lasix 40 TID  - aldactone 50 mg po daily   - isordil 20 mg po TID     Will need to make sure has good HTN BP regimen for home      I think DEPRESSION is a major issue that is causing him to not take his meds     HTN (hypertension)  Patient's blood pressure range in the last 24 hours was: BP  Min: 155/100  Max: 166/107.The patient's inpatient anti-hypertensive regimen is listed below:  Current Antihypertensives  furosemide injection 40 mg, 3 times daily, Intravenous  spironolactone tablet 50 mg, 2 times daily, Oral  isosorbide dinitrate tablet 20 mg, 3 times daily, Oral    Plan  - IV lasix TID for now  -Aldactone 50 mg PO Daily   - Isordil 20 mg PO TID         But will  need to go home on HTN BP Meds      Needs an ARB ,  toprol XL  , Lasix    and might need more also like IMDUR  or CCB   Depression  See HPI       PLAN    - I continued the SSRI that was on his med rec      - I'm checking B12, Folate, TSH, Free T4 and Vitamin D levels now       VTE Risk Mitigation (From admission, onward)           Ordered     Reason for no Mechanical VTE Prophylaxis  Once        Question:  Reasons:  Answer:  Patient is Ambulatory    04/08/25 0536     Place sequential compression device  Until discontinued         04/08/25 0536     IP VTE HIGH RISK PATIENT  Once         04/08/25 0536     Place sequential compression device  Until discontinued         04/08/25 0536                       On 04/08/2025, patient should be placed in hospital observation services under my care.             Koby Brown MD  Department of Hospital Medicine  [facility] - Emergency Dept

## 2025-04-08 NOTE — PROGRESS NOTES
04/08/25 0640   Pain/Comfort/Sleep   Preferred Pain Scale number (Numeric Rating Pain Scale)   Comfort/Acceptable Pain Level 0   Pain Rating (0-10): Rest 3   Cognitive   Cognitive/Neuro/Behavioral WDL WDL   Level of Consciousness (AVPU) alert   Pupils   Pupil PERRLA yes   Har[place] Coma Scale   Best Eye Response 4-->(E4) spontaneous   Best Motor Response 6-->(M6) obeys commands   Best Verbal Response 5-->(V5) oriented   Harrington Coma Scale Score 15   HEENT   HEENT WDL ex;eye  (glasses)   Mouth/Teeth WDL   Mouth/Teeth WDL WDL   Respiratory   Respiratory WDL WDL   Cardiac   Cardiac WDL ex   Additional Documentation   (chf ef 20/25%)   ECG   Pulse 103   Peripheral Neurovascular   Peripheral Neurovascular WDL WDL        Peripheral IV - Single Lumen 04/08/25 0212 20 G Anterior;Distal;Left Upper Arm   Placement Date/Time: 04/08/25 0212   Inserted by: RN  Size (G): 20 G  Orientation: Anterior;Distal;Left  Location: Upper Arm  Site Prep: Chlorhexidine   Insertion attempts (enter comment if more than 2 attempts): 1  Patient Tolerance: Tolerated well   Site Assessment Clean;Dry;Intact   Dressing Status Clean;Dry;Intact   Skin   Skin WDL WDL   Chaim Risk Assessment   Sensory Perception 3-->slightly limited   Moisture 4-->rarely moist   Activity 3-->walks occasionally   Mobility 3-->slightly limited   Nutrition 3-->adequate   Friction and Shear 3-->no apparent problem   Chaim Score 19   Gastrointestinal   GI WDL WDL   Last Bowel Movement 04/07/25   Genitourinary   Genitourinary WDL WDL   Coping/Psychosocial   Observed Emotional State hopeless   Verbalized Emotional State depression   Plan of Care Reviewed With patient   Coping/Psychosocial Interventions   Behavior Management behavioral plan developed   Psychosocial Support   Trust Relationship/Rapport care explained   Safety   Safety WDL WDL   Safety Factors ID band on;call light in reach;wheels locked;bed in low position   Enhanced Safety Measures bed alarm refused    Infection Prevention hand hygiene promoted   Fall Risk Assessment (every shift)   History Of Fall (W/I 3 Mos) 0-->No   Polypharmacy 3-->Yes   Central Nervous System/Psychotropic Medication 0-->No   Cardiovascular Medication 3-->Yes   Age Greater Than 65 Years 0-->No   Altered Elimination 0-->No   Cognitive Deficit 0-->No   Sensory Deficit 0-->No   Dizziness/Vertigo 0-->No   Depression 2-->Yes   Mobility Deficit/Weakness 0-->No   Male 1-->Yes   Fall Risk Score 9   ABC Risk for Fall with Injury Assessment   A= Age: Is the patient greater than or equal to 85 years old or frail due to clinical condition? No   B=Bones: Does the patient have osteoporosis, previous fracture, prolonged steroid use, or metastatic bone cancer? No   C=Coagulation Disorders: Does the patient have a bleeding disorder, either through anticoagulants or underlying clinical condition? No   S=recent Surgery: Is the patient post-op surgicalwith a recent lower limb amputation or recent major abdominal or thoracic surgery? No   Mobility   GEMS ([place] Early Mobility Scale) Level 4-Independent activities   Positioning   Body Position position changed independently   RN Clinical Review   I have evaluated the data collected on this patient and nursing care provided. Done

## 2025-04-08 NOTE — Clinical Note
Diagnosis: Acute on chronic combined systolic and diastolic congestive heart failure [136838]   Future Attending Provider: LIZA ASHRAF [[id number]]   Place in Observation: [facility] [[id number]]   Special Needs:: No Special Needs [1]

## 2025-04-08 NOTE — HPI
"29-year-old male with history of hypertension, combined systolic and diastolic heart failure 9 TTE 4/2/25 LVEF 20-25% )  , morbid obesity, anemia, diabetes presents to the ER due to ongoing episodes of chest pain shortness of breath with dyspnea on exertion and orthopnea.  Patient reports that he has been sleeping in his recliner because he can not lay flat.  He is short of breath at rest and with minimal ambulation.  He reports this is gradually worsened but that has not significantly worse tonight.  He is intermittent chest pain.  Chronic dry cough.  Trace pitting edema he reports he has been compliant with his home medications in the past month.  That has unsure of what his medications are but reports he is taking Jardiance, Imdur and a diuretic though he is unsure what it is.  He is also very depressed with poor eye contact when asked suicide screening questions by triage he reported " my life is fucking worthless" so he was brought to a psychiatric room but patient reports he is not suicidal or homicidal but that has very depressed    Non smoker  Non drinker      In [facility] ER    Labs were normal     BNP was in 400 range     CXR shows pulm edema     He was given IV Lasix and diuresed well           ECHO 4/2/2025    Left Ventricle: The left ventricle is moderately dilated. Normal wall thickness. Moderate global hypokinesis present. There is severely reduced systolic function with a visually estimated ejection fraction of 20 - 25%. There is normal diastolic function. E/A ratio is 1.90.    Right Ventricle: The right ventricle is normal in size. Systolic function is normal.    Mitral Valve: There is mild to moderate regurgitation.    Tricuspid Valve: There is mild to moderate regurgitation. There is mild pulmonary hypertension. The estimated PA systolic pressure is at least 38 mmHg.    Pulmonic Valve: There is mild regurgitation.        Hospital Course:  MARCH 2023 DC SUMMARY ON OUR SERVICE   Wenceslao Jeong is a 27 " year old male with a past medical history of obesity and HTN who presented with shortness of breath, chest pain, and productive cough secondary to a new onset combined congestive heart failure in the setting of hypertensive emergency with demand ischemia. His blood pressure was able to be controlled with a nitrate infusion transitioned to PO medications. He symptoms also improved with IV diuretics. Cardiology was consulted. TTE showed EF of 25% with grade III diastolic dysfunction as well as MR and TR. He is currently stable on room air and off diuretics. Entresto, Aldactone, isosorbide mononitrate and metoprolol have been started. He underwent coronary angiogram 3/14 which showed no evidence of coronary artery disease. He needs a Life Vest on discharge; however, his insurance policy/coverage has decided that they will not cover the cost of this device. A peer to peer meeting took place with continued denial of the device; an appeal is to be instituted. He needs to follow up with his PCP and Cardiology and have a repeat TTE in three months.

## 2025-04-09 LAB
ALBUMIN SERPL-MCNC: 3.8 G/DL (ref 3.5–5.2)
ALP SERPL-CCNC: 65 UNIT/L (ref 55–135)
ALT SERPL-CCNC: 22 UNIT/L (ref 10–44)
ANION GAP (SMH): 7 MMOL/L (ref 8–16)
AST SERPL-CCNC: 15 UNIT/L (ref 10–40)
BILIRUB SERPL-MCNC: 1.2 MG/DL (ref 0.1–1)
BUN SERPL-MCNC: 16 MG/DL (ref 6–20)
CALCIUM SERPL-MCNC: 8.9 MG/DL (ref 8.7–10.5)
CHLORIDE SERPL-SCNC: 104 MMOL/L (ref 95–110)
CO2 SERPL-SCNC: 28 MMOL/L (ref 23–29)
CREAT SERPL-MCNC: 1.4 MG/DL (ref 0.5–1.4)
GFR SERPLBLD CREATININE-BSD FMLA CKD-EPI: >60 ML/MIN/1.73/M2
GLUCOSE SERPL-MCNC: 114 MG/DL (ref 70–110)
MAGNESIUM SERPL-MCNC: 2.1 MG/DL (ref 1.6–2.6)
POTASSIUM SERPL-SCNC: 3.8 MMOL/L (ref 3.5–5.1)
PROT SERPL-MCNC: 7.1 GM/DL (ref 6–8.4)
SODIUM SERPL-SCNC: 139 MMOL/L (ref 136–145)

## 2025-04-09 PROCEDURE — 94761 N-INVAS EAR/PLS OXIMETRY MLT: CPT

## 2025-04-09 PROCEDURE — 83735 ASSAY OF MAGNESIUM: CPT | Performed by: INTERNAL MEDICINE

## 2025-04-09 PROCEDURE — 25000003 PHARM REV CODE 250: Performed by: INTERNAL MEDICINE

## 2025-04-09 PROCEDURE — 12000002 HC ACUTE/MED SURGE SEMI-PRIVATE ROOM

## 2025-04-09 PROCEDURE — 82247 BILIRUBIN TOTAL: CPT | Performed by: INTERNAL MEDICINE

## 2025-04-09 PROCEDURE — 25000003 PHARM REV CODE 250

## 2025-04-09 PROCEDURE — 63600175 PHARM REV CODE 636 W HCPCS: Performed by: INTERNAL MEDICINE

## 2025-04-09 PROCEDURE — 99233 SBSQ HOSP IP/OBS HIGH 50: CPT | Mod: FS,,, | Performed by: INTERNAL MEDICINE

## 2025-04-09 PROCEDURE — 36415 COLL VENOUS BLD VENIPUNCTURE: CPT | Performed by: INTERNAL MEDICINE

## 2025-04-09 RX ORDER — ASPIRIN 325 MG
50000 TABLET, DELAYED RELEASE (ENTERIC COATED) ORAL WEEKLY
Status: DISCONTINUED | OUTPATIENT
Start: 2025-04-09 | End: 2025-04-10 | Stop reason: HOSPADM

## 2025-04-09 RX ADMIN — FUROSEMIDE 40 MG: 10 INJECTION, SOLUTION INTRAVENOUS at 08:04

## 2025-04-09 RX ADMIN — Medication 50000 UNITS: at 01:04

## 2025-04-09 RX ADMIN — METOPROLOL TARTRATE 50 MG: 50 TABLET, FILM COATED ORAL at 08:04

## 2025-04-09 RX ADMIN — SENNOSIDES AND DOCUSATE SODIUM 1 TABLET: 50; 8.6 TABLET ORAL at 08:04

## 2025-04-09 RX ADMIN — POTASSIUM CHLORIDE 20 MEQ: 1500 TABLET, EXTENDED RELEASE ORAL at 09:04

## 2025-04-09 RX ADMIN — LOSARTAN POTASSIUM 50 MG: 50 TABLET, FILM COATED ORAL at 09:04

## 2025-04-09 RX ADMIN — SPIRONOLACTONE 50 MG: 25 TABLET ORAL at 09:04

## 2025-04-09 RX ADMIN — Medication 400 MG: at 08:04

## 2025-04-09 RX ADMIN — FUROSEMIDE 40 MG: 10 INJECTION, SOLUTION INTRAVENOUS at 09:04

## 2025-04-09 RX ADMIN — SPIRONOLACTONE 50 MG: 25 TABLET ORAL at 08:04

## 2025-04-09 RX ADMIN — Medication 400 MG: at 09:04

## 2025-04-09 RX ADMIN — POTASSIUM CHLORIDE 20 MEQ: 1500 TABLET, EXTENDED RELEASE ORAL at 08:04

## 2025-04-09 RX ADMIN — METOPROLOL TARTRATE 50 MG: 50 TABLET, FILM COATED ORAL at 09:04

## 2025-04-09 RX ADMIN — SERTRALINE HYDROCHLORIDE 50 MG: 50 TABLET ORAL at 09:04

## 2025-04-09 NOTE — ASSESSMENT & PLAN NOTE
Patient's blood pressure range in the last 24 hours was: BP  Min: 118/83  Max: 142/87.The patient's inpatient anti-hypertensive regimen is listed below:  Current Antihypertensives  metoprolol tartrate (LOPRESSOR) tablet 50 mg, 2 times daily, Oral  spironolactone tablet 50 mg, 2 times daily, Oral  furosemide injection 40 mg, Every 12 hours, Intravenous  losartan tablet 50 mg, Daily, Oral    Plan  Follow cardiology recommendations   Vital Q4h

## 2025-04-09 NOTE — SUBJECTIVE & OBJECTIVE
Interval History:   Patient seen and examined.  NAD.  Cardiology following.  Patient air.  Review of Systems   Respiratory:  Negative for shortness of breath.    Cardiovascular:  Negative for chest pain.   Psychiatric/Behavioral:  Negative for self-injury and suicidal ideas.      Objective:     Vital Signs (Most Recent):  Temp: 98.2 °F (36.8 °C) (04/09/25 0724)  Pulse: 84 (04/09/25 0817)  Resp: 15 (04/09/25 0724)  BP: 135/87 (04/09/25 0724)  SpO2: 95 % (04/09/25 0724) Vital Signs (24h Range):  Temp:  [98.1 °F (36.7 °C)-98.7 °F (37.1 °C)] 98.2 °F (36.8 °C)  Pulse:  [77-87] 84  Resp:  [15-18] 15  SpO2:  [92 %-96 %] 95 %  BP: (118-142)/(79-87) 135/87     Weight: 135.6 kg (299 lb)  Body mass index is 45.46 kg/m².    Intake/Output Summary (Last 24 hours) at 4/9/2025 1102  Last data filed at 4/9/2025 0907  Gross per 24 hour   Intake 680 ml   Output --   Net 680 ml         Physical Exam  Vitals and nursing note reviewed.   Constitutional:       General: He is not in acute distress.     Appearance: Normal appearance. He is well-developed. He is obese.   HENT:      Nose: No septal deviation.   Neck:      Thyroid: No thyroid mass.      Vascular: No JVD.      Trachea: No tracheal tenderness or tracheal deviation.   Cardiovascular:      Rate and Rhythm: Normal rate and regular rhythm.      Heart sounds: S1 normal and S2 normal. No murmur heard.     No friction rub. No gallop.   Pulmonary:      Effort: Pulmonary effort is normal.      Breath sounds: Normal breath sounds. No decreased breath sounds.   Abdominal:      Palpations: Abdomen is soft. There is no hepatomegaly or splenomegaly.      Tenderness: There is no abdominal tenderness.   Skin:     General: Skin is warm.      Findings: No rash.   Neurological:      General: No focal deficit present.      Mental Status: He is alert and oriented to person, place, and time.   Psychiatric:         Attention and Perception: Attention normal.         Mood and Affect: Affect is flat.                Significant Labs: All pertinent labs within the past 24 hours have been reviewed.  CBC:   Recent Labs   Lab 04/08/25 0211   WBC 13.71*   HGB 13.6*   HCT 39.1*        CMP:   Recent Labs   Lab 04/08/25 0212 04/09/25  0658    139   K 3.8 3.8   CO2 23 28   BUN 16 16   CREATININE 1.3 1.4   CALCIUM 9.0 8.9   ALBUMIN 4.2 3.8   BILITOT 0.9 1.2*   ALKPHOS 66 65   AST 23 15   ALT 29 22     Cardiac Markers:   Recent Labs   Lab 04/08/25 0211   *     Coagulation:   Recent Labs   Lab 04/08/25 0211   INR 1.0     Magnesium:   Recent Labs   Lab 04/08/25 0212 04/09/25 0658   MG 1.9 2.1     Troponin:18.7  TSH:   Recent Labs   Lab 04/08/25  0553   TSH 1.930       Significant Imaging: I have reviewed all pertinent imaging results/findings within the past 24 hours.  Imaging Results              X-Ray Chest AP Portable (Final result)  Result time 04/08/25 02:48:14      Final result by Dnaiel Dewey MD (04/08/25 02:48:14)                   Impression:      Radiographic findings as above.      Electronically signed by: Daniel Dewey  Date:    04/08/2025  Time:    02:48               Narrative:    EXAMINATION:  XR CHEST AP PORTABLE    CLINICAL HISTORY:  CHF;    TECHNIQUE:  Single frontal view of the chest was performed.    COMPARISON:  Chest radiograph March 11, 2025    FINDINGS:  Single portable chest radiograph is submitted.  There is a general pattern of accentuated attenuation consistent with soft tissue attenuation associated with body habitus.  When accounting for position and technique and depth of inspiration the cardiomediastinal silhouette appears stable.    There is prominence of the pulmonary vascular.  There is peribronchial thickening.  There is bilateral perihilar infiltrate, bilateral pattern of pulmonary infiltrate, right greater than left may relate to pulmonary congestion/edema.    Minimal thickening along the minor fissure on the right may relate to minimal pleural fluid  otherwise there is no evidence for significant pleural effusion and there is no evidence for pneumothorax.    The visualized osseous structures appear intact.

## 2025-04-09 NOTE — ASSESSMENT & PLAN NOTE
Resume Home medication  Vit D low 15: start Vit D supplements   B12 345 wnl  TSH/Free T4, B12, and Folate wnl    Denies SI/HI

## 2025-04-09 NOTE — PROGRESS NOTES
"[facility]  Department of Cardiology  Consult Note      PATIENT NAME: Wenceslao Jeong  MRN: [id number]  TODAY'S DATE: 04/09/2025  ADMIT DATE: 4/8/2025                          CONSULT REQUESTED BY: Sher Noriega, *    SUBJECTIVE     PRINCIPAL PROBLEM: Acute on chronic combined systolic and diastolic congestive heart failure      REASON FOR CONSULT:  CHF      HPI:      29 year old male patient admitted with SOB for the past 3 weeks admitted with CHF exacerbation with known NICM.  LVEF 20-25%.                FROM H AND P      Chest Pain        Patient c/o left sided chest pain    Cough    Shortness of Breath    Psychiatric Evaluation       Patient states "My life is fucking worthless"         HPI:   29-year-old male with history of hypertension, combined systolic and diastolic heart failure 9 TTE 4/2/25 LVEF 20-25% )  , morbid obesity, anemia, diabetes presents to the ER due to ongoing episodes of chest pain shortness of breath with dyspnea on exertion and orthopnea.  Patient reports that he has been sleeping in his recliner because he can not lay flat.  He is short of breath at rest and with minimal ambulation.  He reports this is gradually worsened but that has not significantly worse tonight.  He is intermittent chest pain.  Chronic dry cough.  Trace pitting edema he reports he has been compliant with his home medications in the past month.  That has unsure of what his medications are but reports he is taking Jardiance, Imdur and a diuretic though he is unsure what it is.  He is also very depressed with poor eye contact when asked suicide screening questions by triage he reported " my life is fucking worthless" so he was brought to a psychiatric room but patient reports he is not suicidal or homicidal but that has very depressed     Non smoker  Non drinker        In OUR ER     Labs were normal      BNP was in 400 range      CXR shows pulm edema      He was given IV Lasix and diuresed well    "            ECHO 4/2/2025    Left Ventricle: The left ventricle is moderately dilated. Normal wall thickness. Moderate global hypokinesis present. There is severely reduced systolic function with a visually estimated ejection fraction of 20 - 25%. There is normal diastolic function. E/A ratio is 1.90.    Right Ventricle: The right ventricle is normal in size. Systolic function is normal.    Mitral Valve: There is mild to moderate regurgitation.    Tricuspid Valve: There is mild to moderate regurgitation. There is mild pulmonary hypertension. The estimated PA systolic pressure is at least 38 mmHg.    Pulmonic Valve: There is mild regurgitation.           Hospital Course:  MARCH 2023 DC SUMMARY ON OUR SERVICE   Wenceslao Jeong is a 27 year old male with a past medical history of obesity and HTN who presented with shortness of breath, chest pain, and productive cough secondary to a new onset combined congestive heart failure in the setting of hypertensive emergency with demand ischemia. His blood pressure was able to be controlled with a nitrate infusion transitioned to PO medications. He symptoms also improved with IV diuretics. Cardiology was consulted. TTE showed EF of 25% with grade III diastolic dysfunction as well as MR and TR. He is currently stable on room air and off diuretics. Entresto, Aldactone, isosorbide mononitrate and metoprolol have been started. He underwent coronary angiogram 3/14 which showed no evidence of coronary artery disease. He needs a Life Vest on discharge; however, his insurance policy/coverage has decided that they will not cover the cost of this device. A peer to peer meeting took place with continued denial of the device; an appeal is to be instituted. He needs to follow up with his PCP and Cardiology and have a repeat TTE in three months.           Review of patient's allergies indicates:  No Known Allergies    Past Medical History:   Diagnosis Date    CHF (congestive heart failure)      Hypertension      Past Surgical History:   Procedure Laterality Date    LEFT HEART CATHETERIZATION Left 3/14/2023    Procedure: Left heart cath;  Surgeon: Joey Sousa MD;  Location: [facility] CATH/EP LAB;  Service: Cardiology;  Laterality: Left;     Social History[1]     REVIEW OF SYSTEMS    As mentioned in HPI    OBJECTIVE     VITAL SIGNS (Most Recent)  Temp: 98.6 °F (37 °C) (04/09/25 1129)  Pulse: 79 (04/09/25 1153)  Resp: 15 (04/09/25 1129)  BP: 128/88 (04/09/25 1129)  SpO2: 96 % (04/09/25 1425)    VENTILATION STATUS  Resp: 15 (04/09/25 1129)  SpO2: 96 % (04/09/25 1425)           I & O (Last 24H):  Intake/Output Summary (Last 24 hours) at 4/9/2025 1442  Last data filed at 4/9/2025 1301  Gross per 24 hour   Intake 920 ml   Output 550 ml   Net 370 ml       WEIGHTS  Wt Readings from Last 3 Encounters:   04/09/25 0353 135.6 kg (299 lb)   04/08/25 0148 135.6 kg (299 lb)   04/02/25 1432 (!) 138.8 kg (306 lb)   03/26/25 1119 (!) 138.8 kg (306 lb)       PHYSICAL EXAM    CONSTITUTIONAL: Obese male in no distress  HEENT: Normocephalic. No pallor  NECK: no JVD  LUNGS: Diminished  HEART: regular rate and rhythm, S1, S2 normal, no murmur   ABDOMEN: soft, non-tender, bowel sounds normal  EXTREMITIES: mild edema  SKIN: No rash  NEURO: AAO X 3  PSYCH: normal affect      HOME MEDICATIONS:Medications Ordered Prior to Encounter[2]    SCHEDULED MEDS:   cholecalciferol (vitamin D3)  50,000 Units Oral Weekly    furosemide (LASIX) injection  40 mg Intravenous Q12H    losartan  50 mg Oral Daily    magnesium oxide  400 mg Oral BID    metoprolol tartrate  50 mg Oral BID    potassium chloride  20 mEq Oral BID    senna-docusate  1 tablet Oral BID    sertraline  50 mg Oral Daily    spironolactone  50 mg Oral BID       CONTINUOUS INFUSIONS:    PRN MEDS:  Current Facility-Administered Medications:     acetaminophen, 650 mg, Oral, Q8H PRN    acetaminophen, 650 mg, Oral, Q4H PRN    aluminum-magnesium hydroxide-simethicone, 30 mL, Oral, QID  "PRN    dextrose 50%, 12.5 g, Intravenous, PRN    dextrose 50%, 25 g, Intravenous, PRN    glucagon (human recombinant), 1 mg, Intramuscular, PRN    glucose, 16 g, Oral, PRN    glucose, 24 g, Oral, PRN    magnesium oxide, 800 mg, Oral, PRN    magnesium oxide, 800 mg, Oral, PRN    melatonin, 6 mg, Oral, Nightly PRN    naloxone, 0.02 mg, Intravenous, PRN    ondansetron, 4 mg, Intravenous, Q6H PRN    potassium bicarbonate, 35 mEq, Oral, PRN    potassium bicarbonate, 50 mEq, Oral, PRN    potassium bicarbonate, 60 mEq, Oral, PRN    potassium, sodium phosphates, 2 packet, Oral, PRN    potassium, sodium phosphates, 2 packet, Oral, PRN    potassium, sodium phosphates, 2 packet, Oral, PRN    sodium chloride 0.9%, 10 mL, Intravenous, PRN    sodium chloride 0.9%, 3 mL, Intravenous, Q12H PRN    LABS AND DIAGNOSTICS     CBC LAST 3 DAYS  Recent Labs   Lab 04/08/25 0211   WBC 13.71*   RBC 4.99   HGB 13.6*   HCT 39.1*   MCV 78*   MCH 27.3   MCHC 34.8   RDW 15.4*      MPV 11.7   NRBC 0       COAGULATION LAST 3 DAYS  Recent Labs   Lab 04/08/25  0211   INR 1.0       CHEMISTRY LAST 3 DAYS  Recent Labs   Lab 04/08/25 0212 04/09/25  0658    139   K 3.8 3.8   CO2 23 28   BUN 16 16   CREATININE 1.3 1.4   CALCIUM 9.0 8.9   MG 1.9 2.1   ALBUMIN 4.2 3.8   ALKPHOS 66 65   ALT 29 22   AST 23 15   BILITOT 0.9 1.2*       CARDIAC PROFILE LAST 3 DAYS  Recent Labs   Lab 04/08/25  0211   *       ENDOCRINE LAST 3 DAYS  Recent Labs   Lab 04/08/25  0553   TSH 1.930       LAST ARTERIAL BLOOD GAS  ABG  No results for input(s): "PH", "PO2", "PCO2", "HCO3", "BE" in the last 168 hours.    LAST 7 DAYS MICROBIOLOGY   Microbiology Results (last 7 days)       ** No results found for the last 168 hours. **            MOST RECENT IMAGING  X-Ray Chest AP Portable  Narrative: EXAMINATION:  XR CHEST AP PORTABLE    CLINICAL HISTORY:  CHF;    TECHNIQUE:  Single frontal view of the chest was performed.    COMPARISON:  Chest radiograph March 11, " 2025    FINDINGS:  Single portable chest radiograph is submitted.  There is a general pattern of accentuated attenuation consistent with soft tissue attenuation associated with body habitus.  When accounting for position and technique and depth of inspiration the cardiomediastinal silhouette appears stable.    There is prominence of the pulmonary vascular.  There is peribronchial thickening.  There is bilateral perihilar infiltrate, bilateral pattern of pulmonary infiltrate, right greater than left may relate to pulmonary congestion/edema.    Minimal thickening along the minor fissure on the right may relate to minimal pleural fluid otherwise there is no evidence for significant pleural effusion and there is no evidence for pneumothorax.    The visualized osseous structures appear intact.  Impression: Radiographic findings as above.    Electronically signed by: Daniel Dewey  Date:    04/08/2025  Time:    02:48      ECHOCARDIOGRAM RESULTS (last 5)  Results for orders placed during the hospital encounter of 04/02/25    Echo    Interpretation Summary    Left Ventricle: The left ventricle is moderately dilated. Normal wall thickness. Moderate global hypokinesis present. There is severely reduced systolic function with a visually estimated ejection fraction of 20 - 25%. There is normal diastolic function. E/A ratio is 1.90.    Right Ventricle: The right ventricle is normal in size. Systolic function is normal.    Mitral Valve: There is mild to moderate regurgitation.    Tricuspid Valve: There is mild to moderate regurgitation. There is mild pulmonary hypertension. The estimated PA systolic pressure is at least 38 mmHg.    Pulmonic Valve: There is mild regurgitation.      Results for orders placed during the hospital encounter of 06/09/23    Echo    Interpretation Summary  · The left ventricle is moderately enlarged with moderately decreased systolic function.  · Grade III left ventricular diastolic dysfunction.  · The  estimated ejection fraction is 32%.  · There is moderate left ventricular global hypokinesis.  · Normal right ventricular size with normal right ventricular systolic function.  · Mild left atrial enlargement.  · Mild mitral regurgitation.  · Mild tricuspid regurgitation.  · Normal central venous pressure (3 mmHg).  · The estimated PA systolic pressure is 26 mmHg.      Results for orders placed during the hospital encounter of 03/07/23    Echo    Interpretation Summary  · The left ventricle is normal in size with mild eccentric hypertrophy and severely decreased systolic function.  · The estimated ejection fraction is 25%.  · Grade III left ventricular diastolic dysfunction.  · Normal right ventricular size with normal right ventricular systolic function.  · Mild right atrial enlargement.  · Moderate mitral regurgitation.  · Mild to moderate tricuspid regurgitation.  · Mild pulmonic regurgitation.  · Moderate left atrial enlargement.      CURRENT/PREVIOUS VISIT EKG  Results for orders placed or performed during the hospital encounter of 04/08/25   EKG 12-lead    Collection Time: 04/08/25  1:42 AM   Result Value Ref Range    QRS Duration 86 ms    [facility] QTC Calculation 492 ms    Narrative    Test Reason : R06.02,    Vent. Rate : 109 BPM     Atrial Rate : 109 BPM     P-R Int : 168 ms          QRS Dur :  86 ms      QT Int : 366 ms       P-R-T Axes :  53  66  26 degrees    QTcB Int : 492 ms    Sinus tachycardia  Possible Left atrial enlargement  Borderline Abnormal ECG  No previous ECGs available    Referred By: AAAREFERRAL SELF           Confirmed By:            ASSESSMENT/PLAN:     Active Hospital Problems    Diagnosis    *Acute on chronic combined systolic and diastolic congestive heart failure    Depression    Severe obesity (BMI >= 40)    HTN (hypertension)       ASSESSMENT & PLAN:     Acute on Chronic CHF  NICM LVEF 20-25%  HTN  Depression  Tachycardia      RECOMMENDATIONS:      Change IV lasix to PO  tomorrow  Continue Losartan 50 mg PO BID  Continue Aldactone at present dosing  Continue Metoprolol at present dosing  Continue low salt and fluid restriction and daily weights at home  Follow up in clinic  Will sign off        FÉLIX Graham-SUNDEEPP-BC, CVNP-BC  Department of Cardiology  Date of Service: 04/09/2025               [1]   Social History  Tobacco Use    Smoking status: Never    Smokeless tobacco: Never   Substance Use Topics    Alcohol use: Not Currently    Drug use: Not Currently   [2]   No current facility-administered medications on file prior to encounter.     Current Outpatient Medications on File Prior to Encounter   Medication Sig Dispense Refill    empagliflozin (JARDIANCE) 10 mg tablet Take 1 tablet (10 mg total) by mouth once daily. 90 tablet 3    metoprolol tartrate (LOPRESSOR) 25 MG tablet Take 2 tablets (50 mg total) by mouth 2 (two) times daily. 180 tablet 3    spironolactone (ALDACTONE) 25 MG tablet Take 1 tablet (25 mg total) by mouth once daily. 90 tablet 3    sacubitriL-valsartan (ENTRESTO) 24-26 mg per tablet Take 1 tablet by mouth 2 (two) times daily. (Patient not taking: Reported on 4/8/2025) 180 tablet 3

## 2025-04-09 NOTE — HOSPITAL COURSE
Wenceslao Jeong was closely monitored while in the hospital.  Patient was admitted to Hospital Medicine for acute on chronic combined systolic and diastolic congestive heart failure.  Echocardiogram was performed which revealed an ejection fraction of 20-25%.  Cardiology was consulted.  Was to afford prescribed Entresto and Cardiology made changes to medication.  Patient was switched to and Lasix 40 mg IV b.i.d, losartan, Aldactone, and metoprolol.  Patient was maintained on a low-sodium diet with a 2000 mL fluid restriction. Patient's vital signs stable on room air.  Patient was cleared by Cardiology for discharge.     Patient seen and examined on day of discharge.  Patient in no acute distress.  Chart reviewed.  Patient educated on discharge planning.  Patient was educated on low-sodium fluid restriction diet and that he will need to take his weight daily upon awakening.  He was also educated that if he noticed a 2-3 lb weight change in 1 day or 5 lb in 1 week please contact your primary care provider or cardiologist.  If patient begins to experience any shortness for breath, dyspnea exertion, or chest pain he was informed to report to the emergency department for further evaluation, he verbalized understanding.  Patient requested referral for sleep study, referral ordered.  Patient safely discharged home.

## 2025-04-09 NOTE — ASSESSMENT & PLAN NOTE
Body mass index is 45.46 kg/m². Morbid obesity complicates all aspects of disease management from diagnostic modalities to treatment. Weight loss encouraged and health benefits explained to patient.

## 2025-04-09 NOTE — PROGRESS NOTES
"[facility] Medicine  Progress Note    Patient Name: Wenceslao Jeong  MRN: [id number]  Patient Class: IP- Inpatient   Admission Date: 4/8/2025  Length of Stay: 1 days  Attending Physician: Sher Noriega, *  Primary Care Provider: Aida Snow NP        Subjective     Principal Problem:Acute on chronic combined systolic and diastolic congestive heart failure        HPI:  29-year-old male with history of hypertension, combined systolic and diastolic heart failure 9 TTE 4/2/25 LVEF 20-25% )  , morbid obesity, anemia, diabetes presents to the ER due to ongoing episodes of chest pain shortness of breath with dyspnea on exertion and orthopnea.  Patient reports that he has been sleeping in his recliner because he can not lay flat.  He is short of breath at rest and with minimal ambulation.  He reports this is gradually worsened but that has not significantly worse tonight.  He is intermittent chest pain.  Chronic dry cough.  Trace pitting edema he reports he has been compliant with his home medications in the past month.  That has unsure of what his medications are but reports he is taking Jardiance, Imdur and a diuretic though he is unsure what it is.  He is also very depressed with poor eye contact when asked suicide screening questions by triage he reported " my life is fucking worthless" so he was brought to a psychiatric room but patient reports he is not suicidal or homicidal but that has very depressed    Non smoker  Non drinker      In OUR ER    Labs were normal     BNP was in 400 range     CXR shows pulm edema     He was given IV Lasix and diuresed well           ECHO 4/2/2025    Left Ventricle: The left ventricle is moderately dilated. Normal wall thickness. Moderate global hypokinesis present. There is severely reduced systolic function with a visually estimated ejection fraction of 20 - 25%. There is normal diastolic function. E/A ratio is 1.90.    Right Ventricle: The right " ventricle is normal in size. Systolic function is normal.    Mitral Valve: There is mild to moderate regurgitation.    Tricuspid Valve: There is mild to moderate regurgitation. There is mild pulmonary hypertension. The estimated PA systolic pressure is at least 38 mmHg.    Pulmonic Valve: There is mild regurgitation.        Hospital Course:  MARCH 2023 DC SUMMARY ON OUR SERVICE   Wenceslao Jeong is a 27 year old male with a past medical history of obesity and HTN who presented with shortness of breath, chest pain, and productive cough secondary to a new onset combined congestive heart failure in the setting of hypertensive emergency with demand ischemia. His blood pressure was able to be controlled with a nitrate infusion transitioned to PO medications. He symptoms also improved with IV diuretics. Cardiology was consulted. TTE showed EF of 25% with grade III diastolic dysfunction as well as MR and TR. He is currently stable on room air and off diuretics. Entresto, Aldactone, isosorbide mononitrate and metoprolol have been started. He underwent coronary angiogram 3/14 which showed no evidence of coronary artery disease. He needs a Life Vest on discharge; however, his insurance policy/coverage has decided that they will not cover the cost of this device. A peer to peer meeting took place with continued denial of the device; an appeal is to be instituted. He needs to follow up with his PCP and Cardiology and have a repeat TTE in three months.    Overview/Hospital Course:  Wenceslao Jeong was closely monitored while in the hospital.  Patient was admitted to Hospital Medicine for acute on chronic combined systolic and diastolic congestive heart failure.  Echocardiogram was performed which revealed an ejection fraction of 20-25%.  Cardiology was consulted.  Was to afford prescribed Entresto and Cardiology made changes to medication.  Patient was switched to and Lasix 40 mg IV b.i.d. Patient's vital signs stable on room  air.    Interval History:   Patient seen and examined.  NAD.  Cardiology following.  Patient air.  Review of Systems   Respiratory:  Negative for shortness of breath.    Cardiovascular:  Negative for chest pain.   Psychiatric/Behavioral:  Negative for self-injury and suicidal ideas.      Objective:     Vital Signs (Most Recent):  Temp: 98.2 °F (36.8 °C) (04/09/25 0724)  Pulse: 84 (04/09/25 0817)  Resp: 15 (04/09/25 0724)  BP: 135/87 (04/09/25 0724)  SpO2: 95 % (04/09/25 0724) Vital Signs (24h Range):  Temp:  [98.1 °F (36.7 °C)-98.7 °F (37.1 °C)] 98.2 °F (36.8 °C)  Pulse:  [77-87] 84  Resp:  [15-18] 15  SpO2:  [92 %-96 %] 95 %  BP: (118-142)/(79-87) 135/87     Weight: 135.6 kg (299 lb)  Body mass index is 45.46 kg/m².    Intake/Output Summary (Last 24 hours) at 4/9/2025 1102  Last data filed at 4/9/2025 0907  Gross per 24 hour   Intake 680 ml   Output --   Net 680 ml         Physical Exam  Vitals and nursing note reviewed.   Constitutional:       General: He is not in acute distress.     Appearance: Normal appearance. He is well-developed. He is obese.   HENT:      Nose: No septal deviation.   Neck:      Thyroid: No thyroid mass.      Vascular: No JVD.      Trachea: No tracheal tenderness or tracheal deviation.   Cardiovascular:      Rate and Rhythm: Normal rate and regular rhythm.      Heart sounds: S1 normal and S2 normal. No murmur heard.     No friction rub. No gallop.   Pulmonary:      Effort: Pulmonary effort is normal.      Breath sounds: Normal breath sounds. No decreased breath sounds.   Abdominal:      Palpations: Abdomen is soft. There is no hepatomegaly or splenomegaly.      Tenderness: There is no abdominal tenderness.   Skin:     General: Skin is warm.      Findings: No rash.   Neurological:      General: No focal deficit present.      Mental Status: He is alert and oriented to person, place, and time.   Psychiatric:         Attention and Perception: Attention normal.         Mood and Affect: Affect is  flat.               Significant Labs: All pertinent labs within the past 24 hours have been reviewed.  CBC:   Recent Labs   Lab 04/08/25 0211   WBC 13.71*   HGB 13.6*   HCT 39.1*        CMP:   Recent Labs   Lab 04/08/25 0212 04/09/25  0658    139   K 3.8 3.8   CO2 23 28   BUN 16 16   CREATININE 1.3 1.4   CALCIUM 9.0 8.9   ALBUMIN 4.2 3.8   BILITOT 0.9 1.2*   ALKPHOS 66 65   AST 23 15   ALT 29 22     Cardiac Markers:   Recent Labs   Lab 04/08/25 0211   *     Coagulation:   Recent Labs   Lab 04/08/25 0211   INR 1.0     Magnesium:   Recent Labs   Lab 04/08/25 0212 04/09/25 0658   MG 1.9 2.1     Troponin:18.7  TSH:   Recent Labs   Lab 04/08/25  0553   TSH 1.930       Significant Imaging: I have reviewed all pertinent imaging results/findings within the past 24 hours.  Imaging Results              X-Ray Chest AP Portable (Final result)  Result time 04/08/25 02:48:14      Final result by Daniel Dewey MD (04/08/25 02:48:14)                   Impression:      Radiographic findings as above.      Electronically signed by: Daniel Dewey  Date:    04/08/2025  Time:    02:48               Narrative:    EXAMINATION:  XR CHEST AP PORTABLE    CLINICAL HISTORY:  CHF;    TECHNIQUE:  Single frontal view of the chest was performed.    COMPARISON:  Chest radiograph March 11, 2025    FINDINGS:  Single portable chest radiograph is submitted.  There is a general pattern of accentuated attenuation consistent with soft tissue attenuation associated with body habitus.  When accounting for position and technique and depth of inspiration the cardiomediastinal silhouette appears stable.    There is prominence of the pulmonary vascular.  There is peribronchial thickening.  There is bilateral perihilar infiltrate, bilateral pattern of pulmonary infiltrate, right greater than left may relate to pulmonary congestion/edema.    Minimal thickening along the minor fissure on the right may relate to minimal pleural  fluid otherwise there is no evidence for significant pleural effusion and there is no evidence for pneumothorax.    The visualized osseous structures appear intact.                                         Assessment & Plan  Acute on chronic combined systolic and diastolic congestive heart failure  Patient is identified as having Combined Systolic and Diastolic heart failure that is Acute on Chronic. CHF is currently uncontrolled due to >3 pillow orthopnea. Latest ECHO performed and demonstrates- Results for orders placed during the hospital encounter of 04/02/25    Echo    Interpretation Summary    Left Ventricle: The left ventricle is moderately dilated. Normal wall thickness. Moderate global hypokinesis present. There is severely reduced systolic function with a visually estimated ejection fraction of 20 - 25%. There is normal diastolic function. E/A ratio is 1.90.    Right Ventricle: The right ventricle is normal in size. Systolic function is normal.    Mitral Valve: There is mild to moderate regurgitation.    Tricuspid Valve: There is mild to moderate regurgitation. There is mild pulmonary hypertension. The estimated PA systolic pressure is at least 38 mmHg.    Pulmonic Valve: There is mild regurgitation.  . Continue Beta Blocker ACE/ARB Furosemide Aldactone and monitor clinical status closely. Monitor on telemetry. Patient is on CHF pathway.  Monitor strict Is&Os and daily weights.  Place on fluid restriction of 2 L. Cardiology is consulted. Continue to stress to patient importance of self efficacy and  on diet for CHF. Last BNP reviewed- and noted below   Recent Labs   Lab 04/08/25  0211   *       Current Heart Failure Medications  spironolactone tablet 50 mg, 2 times daily, Oral  furosemide injection 40 mg, Every 12 hours, Intravenous  losartan tablet 50 mg, Daily, Oral      HTN (hypertension)  Patient's blood pressure range in the last 24 hours was: BP  Min: 118/83  Max: 142/87.The patient's  inpatient anti-hypertensive regimen is listed below:  Current Antihypertensives  metoprolol tartrate (LOPRESSOR) tablet 50 mg, 2 times daily, Oral  spironolactone tablet 50 mg, 2 times daily, Oral  furosemide injection 40 mg, Every 12 hours, Intravenous  losartan tablet 50 mg, Daily, Oral    Plan  Follow cardiology recommendations   Vital Q4h      Depression  Resume Home medication  Vit D low 15: start Vit D supplements   B12 345 wnl  TSH/Free T4, B12, and Folate wnl    Denies SI/HI         Severe obesity (BMI >= 40)  Body mass index is 45.46 kg/m². Morbid obesity complicates all aspects of disease management from diagnostic modalities to treatment. Weight loss encouraged and health benefits explained to patient.       VTE Risk Mitigation (From admission, onward)           Ordered     Reason for no Mechanical VTE Prophylaxis  Once        Question:  Reasons:  Answer:  Patient is Ambulatory    04/08/25 0536     Place sequential compression device  Until discontinued         04/08/25 0536     IP VTE HIGH RISK PATIENT  Once         04/08/25 0536     Place sequential compression device  Until discontinued         04/08/25 0536                    Discharge Planning   CAROLINE: 4/10/2025     Code Status: Full Code   Medical Readiness for Discharge Date:   Discharge Plan A: Community Services   Discharge Delays: None known at this time                    Boby Farah NP  Department of Hospital Medicine   [facility]

## 2025-04-09 NOTE — ASSESSMENT & PLAN NOTE
Patient is identified as having Combined Systolic and Diastolic heart failure that is Acute on Chronic. CHF is currently uncontrolled due to >3 pillow orthopnea. Latest ECHO performed and demonstrates- Results for orders placed during the hospital encounter of 04/02/25    Echo    Interpretation Summary    Left Ventricle: The left ventricle is moderately dilated. Normal wall thickness. Moderate global hypokinesis present. There is severely reduced systolic function with a visually estimated ejection fraction of 20 - 25%. There is normal diastolic function. E/A ratio is 1.90.    Right Ventricle: The right ventricle is normal in size. Systolic function is normal.    Mitral Valve: There is mild to moderate regurgitation.    Tricuspid Valve: There is mild to moderate regurgitation. There is mild pulmonary hypertension. The estimated PA systolic pressure is at least 38 mmHg.    Pulmonic Valve: There is mild regurgitation.  . Continue Beta Blocker ACE/ARB Furosemide Aldactone and monitor clinical status closely. Monitor on telemetry. Patient is on CHF pathway.  Monitor strict Is&Os and daily weights.  Place on fluid restriction of 2 L. Cardiology is consulted. Continue to stress to patient importance of self efficacy and  on diet for CHF. Last BNP reviewed- and noted below   Recent Labs   Lab 04/08/25  0211   *       Current Heart Failure Medications  spironolactone tablet 50 mg, 2 times daily, Oral  furosemide injection 40 mg, Every 12 hours, Intravenous  losartan tablet 50 mg, Daily, Oral

## 2025-04-09 NOTE — CARE UPDATE
04/08/25 2125   Patient Assessment/Suction   Level of Consciousness (AVPU) alert   Respiratory Effort Unlabored   Expansion/Accessory Muscles/Retractions no use of accessory muscles   All Lung Fields Breath Sounds clear;diminished;equal bilaterally   Rhythm/Pattern, Respiratory no shortness of breath reported   Cough Frequency no cough   PRE-TX-O2   Device (Oxygen Therapy) room air   SpO2 96 %   Pulse Oximetry Type Intermittent   $ Pulse Oximetry - Multiple Charge Pulse Oximetry - Multiple   Pulse 77   Resp 18   Positioning   Head of Bed (HOB) Positioning HOB elevated;HOB at 30 degrees

## 2025-04-10 ENCOUNTER — TELEPHONE (OUTPATIENT)
Dept: FAMILY MEDICINE | Facility: CLINIC | Age: 30
End: 2025-04-10
Payer: COMMERCIAL

## 2025-04-10 VITALS
OXYGEN SATURATION: 95 % | HEART RATE: 78 BPM | SYSTOLIC BLOOD PRESSURE: 115 MMHG | HEIGHT: 68 IN | DIASTOLIC BLOOD PRESSURE: 75 MMHG | WEIGHT: 299 LBS | BODY MASS INDEX: 45.31 KG/M2 | RESPIRATION RATE: 18 BRPM | TEMPERATURE: 98 F

## 2025-04-10 LAB
ABSOLUTE EOSINOPHIL (SMH): 0.15 K/UL
ABSOLUTE MONOCYTE (SMH): 0.95 K/UL (ref 0.3–1)
ABSOLUTE NEUTROPHIL COUNT (SMH): 5.6 K/UL (ref 1.8–7.7)
ALBUMIN SERPL-MCNC: 3.9 G/DL (ref 3.5–5.2)
ALP SERPL-CCNC: 66 UNIT/L (ref 55–135)
ALT SERPL-CCNC: 21 UNIT/L (ref 10–44)
ANION GAP (SMH): 7 MMOL/L (ref 8–16)
AST SERPL-CCNC: 13 UNIT/L (ref 10–40)
BASOPHILS # BLD AUTO: 0.04 K/UL
BASOPHILS NFR BLD AUTO: 0.4 %
BILIRUB SERPL-MCNC: 0.9 MG/DL (ref 0.1–1)
BUN SERPL-MCNC: 21 MG/DL (ref 6–20)
CALCIUM SERPL-MCNC: 9.3 MG/DL (ref 8.7–10.5)
CHLORIDE SERPL-SCNC: 105 MMOL/L (ref 95–110)
CO2 SERPL-SCNC: 27 MMOL/L (ref 23–29)
CREAT SERPL-MCNC: 1.4 MG/DL (ref 0.5–1.4)
ERYTHROCYTE [DISTWIDTH] IN BLOOD BY AUTOMATED COUNT: 15.5 % (ref 11.5–14.5)
GFR SERPLBLD CREATININE-BSD FMLA CKD-EPI: >60 ML/MIN/1.73/M2
GLUCOSE SERPL-MCNC: 116 MG/DL (ref 70–110)
HCT VFR BLD AUTO: 42.3 % (ref 40–54)
HGB BLD-MCNC: 14.4 GM/DL (ref 14–18)
IMM GRANULOCYTES # BLD AUTO: 0.05 K/UL (ref 0–0.04)
IMM GRANULOCYTES NFR BLD AUTO: 0.5 % (ref 0–0.5)
LYMPHOCYTES # BLD AUTO: 3.77 K/UL (ref 1–4.8)
MAGNESIUM SERPL-MCNC: 2.1 MG/DL (ref 1.6–2.6)
MCH RBC QN AUTO: 27.2 PG (ref 27–31)
MCHC RBC AUTO-ENTMCNC: 34 G/DL (ref 32–36)
MCV RBC AUTO: 80 FL (ref 82–98)
NUCLEATED RBC (/100WBC) (SMH): 0 /100 WBC
PLATELET # BLD AUTO: 294 K/UL (ref 150–450)
PMV BLD AUTO: 12 FL (ref 9.2–12.9)
POTASSIUM SERPL-SCNC: 3.8 MMOL/L (ref 3.5–5.1)
PROT SERPL-MCNC: 7.3 GM/DL (ref 6–8.4)
RBC # BLD AUTO: 5.3 M/UL (ref 4.6–6.2)
RELATIVE EOSINOPHIL (SMH): 1.4 % (ref 0–8)
RELATIVE LYMPHOCYTE (SMH): 35.6 % (ref 18–48)
RELATIVE MONOCYTE (SMH): 9 % (ref 4–15)
RELATIVE NEUTROPHIL (SMH): 53.1 % (ref 38–73)
SODIUM SERPL-SCNC: 139 MMOL/L (ref 136–145)
WBC # BLD AUTO: 10.6 K/UL (ref 3.9–12.7)

## 2025-04-10 PROCEDURE — 83735 ASSAY OF MAGNESIUM: CPT | Performed by: INTERNAL MEDICINE

## 2025-04-10 PROCEDURE — 85025 COMPLETE CBC W/AUTO DIFF WBC: CPT

## 2025-04-10 PROCEDURE — 25000003 PHARM REV CODE 250: Performed by: INTERNAL MEDICINE

## 2025-04-10 PROCEDURE — 80053 COMPREHEN METABOLIC PANEL: CPT | Performed by: INTERNAL MEDICINE

## 2025-04-10 PROCEDURE — 36415 COLL VENOUS BLD VENIPUNCTURE: CPT | Performed by: INTERNAL MEDICINE

## 2025-04-10 PROCEDURE — 63600175 PHARM REV CODE 636 W HCPCS: Performed by: INTERNAL MEDICINE

## 2025-04-10 RX ORDER — SPIRONOLACTONE 50 MG/1
50 TABLET, FILM COATED ORAL 2 TIMES DAILY
Qty: 60 TABLET | Refills: 11 | Status: SHIPPED | OUTPATIENT
Start: 2025-04-10 | End: 2026-04-10

## 2025-04-10 RX ORDER — FUROSEMIDE 40 MG/1
40 TABLET ORAL 2 TIMES DAILY
Qty: 60 TABLET | Refills: 1 | Status: SHIPPED | OUTPATIENT
Start: 2025-04-10

## 2025-04-10 RX ORDER — ASPIRIN 325 MG
50000 TABLET, DELAYED RELEASE (ENTERIC COATED) ORAL
Qty: 4 CAPSULE | Refills: 2 | Status: SHIPPED | OUTPATIENT
Start: 2025-04-10 | End: 2025-07-09

## 2025-04-10 RX ORDER — SERTRALINE HYDROCHLORIDE 50 MG/1
50 TABLET, FILM COATED ORAL DAILY
Qty: 30 TABLET | Refills: 11 | Status: SHIPPED | OUTPATIENT
Start: 2025-04-10 | End: 2026-04-10

## 2025-04-10 RX ORDER — LOSARTAN POTASSIUM 50 MG/1
50 TABLET ORAL DAILY
Qty: 90 TABLET | Refills: 3 | Status: SHIPPED | OUTPATIENT
Start: 2025-04-11 | End: 2026-04-11

## 2025-04-10 RX ADMIN — Medication 400 MG: at 09:04

## 2025-04-10 RX ADMIN — FUROSEMIDE 40 MG: 10 INJECTION, SOLUTION INTRAVENOUS at 09:04

## 2025-04-10 RX ADMIN — METOPROLOL TARTRATE 50 MG: 50 TABLET, FILM COATED ORAL at 09:04

## 2025-04-10 RX ADMIN — LOSARTAN POTASSIUM 50 MG: 50 TABLET, FILM COATED ORAL at 09:04

## 2025-04-10 RX ADMIN — POTASSIUM CHLORIDE 20 MEQ: 1500 TABLET, EXTENDED RELEASE ORAL at 09:04

## 2025-04-10 RX ADMIN — SPIRONOLACTONE 50 MG: 25 TABLET ORAL at 09:04

## 2025-04-10 RX ADMIN — SERTRALINE HYDROCHLORIDE 50 MG: 50 TABLET ORAL at 09:04

## 2025-04-10 NOTE — PLAN OF CARE
Problem: Adult Inpatient Plan of Care  Goal: Plan of Care Review  Outcome: Met  Goal: Patient-Specific Goal (Individualized)  Outcome: Met  Goal: Absence of Hospital-Acquired Illness or Injury  Outcome: Met  Goal: Optimal Comfort and Wellbeing  Outcome: Met  Goal: Readiness for Transition of Care  Outcome: Met     Problem: Bariatric Environmental Safety  Goal: Safety Maintained with Care  Outcome: Met     Problem: Suicide Risk  Goal: Absence of Self-Harm  Outcome: Met

## 2025-04-10 NOTE — CONSULTS
[facility]  Adult Nutrition  Education Short Note      Nutrition Education    Previous education: yes    Diet at home: Cardiac    Written information provided and explained on  cardiac diet.     Discussed with: patient    Educational Need? yes    Barriers: none identified    Interventions: Fluid modified diet and Sodium modified diet    Patient and/or family comprehend instructions: yes    Outcome: Verbalizes understanding     Thanks for the consult!    Karo Ruth RD 04/10/2025 1:49 PM

## 2025-04-10 NOTE — DISCHARGE INSTRUCTIONS
Review attached patient instructions.  Take all medications as prescribed.  You were admitted to Hospital Medicine for acute on chronic combined systolic and diastolic congestive heart failure.  Echocardiogram was performed and showed that you had a ejection fraction of 20-25% which is low.  You were started on Lasix, losartan, Aldactone, and metoprolol to manage your heart failure.  You were to continue to maintain a low-sodium fluid restriction diet with daily weights at home.  If you noticed a 2-3 lb weight change in 1 day or 5 lb in 1 week please contact your primary care provider or cardiologist.  If you began to experience any shortness for breath, dyspnea exertion, or chest pain please report to the emergency department for further evaluation.  Keep a log of daily weights and bring them to follow up appointment with Cardiology.  You are to follow up with Cardiology outpatient.

## 2025-04-10 NOTE — ASSESSMENT & PLAN NOTE
Patient is identified as having Combined Systolic and Diastolic heart failure that is Acute on Chronic. CHF is currently uncontrolled due to >3 pillow orthopnea. Latest ECHO performed and demonstrates- Results for orders placed during the hospital encounter of 04/02/25    Echo    Interpretation Summary    Left Ventricle: The left ventricle is moderately dilated. Normal wall thickness. Moderate global hypokinesis present. There is severely reduced systolic function with a visually estimated ejection fraction of 20 - 25%. There is normal diastolic function. E/A ratio is 1.90.    Right Ventricle: The right ventricle is normal in size. Systolic function is normal.    Mitral Valve: There is mild to moderate regurgitation.    Tricuspid Valve: There is mild to moderate regurgitation. There is mild pulmonary hypertension. The estimated PA systolic pressure is at least 38 mmHg.    Pulmonic Valve: There is mild regurgitation.  . Continue Beta Blocker ACE/ARB Furosemide Aldactone and monitor clinical status closely. Monitor on telemetry. Patient is on CHF pathway.  Monitor strict Is&Os and daily weights.  Place on fluid restriction of 2 L. Cardiology is consulted. Continue to stress to patient importance of self efficacy and  on diet for CHF. Last BNP reviewed- and noted below   Recent Labs   Lab 04/08/25  0211   *       Current Heart Failure Medications  spironolactone tablet 50 mg, 2 times daily, Oral  furosemide injection 40 mg, Every 12 hours, Intravenous  losartan tablet 50 mg, Daily, Oral  spironolactone (ALDACTONE) tablet, 2 times daily, Oral  losartan (COZAAR) tablet, Daily, Oral  furosemide (LASIX) tablet, 2 times daily, Oral

## 2025-04-10 NOTE — PLAN OF CARE
Discharge orders and chart reviewed. No other discharge needs noted at this time. Pt is clear for discharge from case management, after medications delivered to bedside from [facility]. Pt is discharging to home.    [employer] message sent to PCP clinic requesting hospital follow up appointment - clinic to contact patient with appointment date/time    Patient to drive self home     04/10/25 2589   Final Note   Assessment Type Final Discharge Note   Anticipated Discharge Disposition Home   What phone number can be called within the next 1-3 days to see how you are doing after discharge? [phone]   Hospital Resources/Appts/Education Provided Appointment suggestion unavailable   Post-Acute Status   Discharge Delays None known at this time

## 2025-04-10 NOTE — NURSING
[id number] pt verbalized understanding of discharge instructions, IV and tele box removed. Box returned to monitor room. Pt ambulated down to private vehicle with all personal belongings.

## 2025-04-10 NOTE — ASSESSMENT & PLAN NOTE
Patient's blood pressure range in the last 24 hours was: BP  Min: 106/77  Max: 139/85.The patient's inpatient anti-hypertensive regimen is listed below:  Current Antihypertensives  metoprolol tartrate (LOPRESSOR) tablet 50 mg, 2 times daily, Oral  spironolactone tablet 50 mg, 2 times daily, Oral  furosemide injection 40 mg, Every 12 hours, Intravenous  losartan tablet 50 mg, Daily, Oral  spironolactone (ALDACTONE) tablet, 2 times daily, Oral  losartan (COZAAR) tablet, Daily, Oral  furosemide (LASIX) tablet, 2 times daily, Oral    Plan  Follow cardiology recommendations   Vital Q4h

## 2025-04-10 NOTE — TELEPHONE ENCOUNTER
----- Message from  Lydia sent at 4/10/2025  1:53 PM CDT -----  Good afternoon[facility] for [facility]. No appointments available in [facility]. Please schedule hospital follow up appointment and contact patient with appointment date/time. Thank Kay

## 2025-04-10 NOTE — DISCHARGE SUMMARY
"[facility] Medicine  Discharge Summary      Patient Name: Wenceslao Jeong  MRN: [id number]  CODY: [id number]  Patient Class: IP- Inpatient  Admission Date: 4/8/2025  Hospital Length of Stay: 2 days  Discharge Date and Time: 4/10/2025  3:49 PM  Attending Physician: Sher Noriega, *   Discharging Provider: Boby Farah NP  Primary Care Provider: Aida Snow NP    Primary Care Team: Networked reference to record PCT     HPI:   29-year-old male with history of hypertension, combined systolic and diastolic heart failure 9 TTE 4/2/25 LVEF 20-25% )  , morbid obesity, anemia, diabetes presents to the ER due to ongoing episodes of chest pain shortness of breath with dyspnea on exertion and orthopnea.  Patient reports that he has been sleeping in his recliner because he can not lay flat.  He is short of breath at rest and with minimal ambulation.  He reports this is gradually worsened but that has not significantly worse tonight.  He is intermittent chest pain.  Chronic dry cough.  Trace pitting edema he reports he has been compliant with his home medications in the past month.  That has unsure of what his medications are but reports he is taking Jardiance, Imdur and a diuretic though he is unsure what it is.  He is also very depressed with poor eye contact when asked suicide screening questions by triage he reported " my life is fucking worthless" so he was brought to a psychiatric room but patient reports he is not suicidal or homicidal but that has very depressed    Non smoker  Non drinker      In OUR ER    Labs were normal     BNP was in 400 range     CXR shows pulm edema     He was given IV Lasix and diuresed well           ECHO 4/2/2025    Left Ventricle: The left ventricle is moderately dilated. Normal wall thickness. Moderate global hypokinesis present. There is severely reduced systolic function with a visually estimated ejection fraction of 20 - 25%. There is normal diastolic " function. E/A ratio is 1.90.    Right Ventricle: The right ventricle is normal in size. Systolic function is normal.    Mitral Valve: There is mild to moderate regurgitation.    Tricuspid Valve: There is mild to moderate regurgitation. There is mild pulmonary hypertension. The estimated PA systolic pressure is at least 38 mmHg.    Pulmonic Valve: There is mild regurgitation.        Hospital Course:  MARCH 2023 DC SUMMARY ON OUR SERVICE   Wenceslao Jeong is a 27 year old male with a past medical history of obesity and HTN who presented with shortness of breath, chest pain, and productive cough secondary to a new onset combined congestive heart failure in the setting of hypertensive emergency with demand ischemia. His blood pressure was able to be controlled with a nitrate infusion transitioned to PO medications. He symptoms also improved with IV diuretics. Cardiology was consulted. TTE showed EF of 25% with grade III diastolic dysfunction as well as MR and TR. He is currently stable on room air and off diuretics. Entresto, Aldactone, isosorbide mononitrate and metoprolol have been started. He underwent coronary angiogram 3/14 which showed no evidence of coronary artery disease. He needs a Life Vest on discharge; however, his insurance policy/coverage has decided that they will not cover the cost of this device. A peer to peer meeting took place with continued denial of the device; an appeal is to be instituted. He needs to follow up with his PCP and Cardiology and have a repeat TTE in three months.    * No surgery found *      Hospital Course:   Wenceslao Jeong was closely monitored while in the hospital.  Patient was admitted to Hospital Medicine for acute on chronic combined systolic and diastolic congestive heart failure.  Echocardiogram was performed which revealed an ejection fraction of 20-25%.  Cardiology was consulted.  Was to afford prescribed Entresto and Cardiology made changes to medication.  Patient was  switched to and Lasix 40 mg IV b.i.d, losartan, Aldactone, and metoprolol.  Patient was maintained on a low-sodium diet with a 2000 mL fluid restriction. Patient's vital signs stable on room air.  Patient was cleared by Cardiology for discharge.     Patient seen and examined on day of discharge.  Patient in no acute distress.  Chart reviewed.  Patient educated on discharge planning.  Patient was educated on low-sodium fluid restriction diet and that he will need to take his weight daily upon awakening.  He was also educated that if he noticed a 2-3 lb weight change in 1 day or 5 lb in 1 week please contact your primary care provider or cardiologist.  If patient begins to experience any shortness for breath, dyspnea exertion, or chest pain he was informed to report to the emergency department for further evaluation, he verbalized understanding.  Patient requested referral for sleep study, referral ordered.  Patient safely discharged home.      Goals of Care Treatment Preferences:  Code Status: Full Code      SDOH Screening:  The patient declined to be screened for utility difficulties, food insecurity, transport difficulties, housing insecurity, and interpersonal safety, so no concerns could be identified this admission.     Consults:   Consults (From admission, onward)          Status Ordering Provider     Inpatient consult to Registered Dietitian/Nutritionist  Once        Provider:  (Not yet assigned)    Acknowledged GEO GIL     Inpatient consult to Cardiology  Once        Provider:  (Not yet assigned)    Completed CHI LAGOS     Inpatient consult to   Once        Provider:  (Not yet assigned)    Completed LIZA ASHRAF     Inpatient consult to Social Work/Case Management  Once        Provider:  (Not yet assigned)    Completed LIZA ASHRAF     Inpatient consult to Registered Dietitian/Nutritionist  Once        Provider:  (Not yet assigned)    Completed LIZA ASHRAF             Assessment & Plan  Acute on chronic combined systolic and diastolic congestive heart failure  Patient is identified as having Combined Systolic and Diastolic heart failure that is Acute on Chronic. CHF is currently uncontrolled due to >3 pillow orthopnea. Latest ECHO performed and demonstrates- Results for orders placed during the hospital encounter of 04/02/25    Echo    Interpretation Summary    Left Ventricle: The left ventricle is moderately dilated. Normal wall thickness. Moderate global hypokinesis present. There is severely reduced systolic function with a visually estimated ejection fraction of 20 - 25%. There is normal diastolic function. E/A ratio is 1.90.    Right Ventricle: The right ventricle is normal in size. Systolic function is normal.    Mitral Valve: There is mild to moderate regurgitation.    Tricuspid Valve: There is mild to moderate regurgitation. There is mild pulmonary hypertension. The estimated PA systolic pressure is at least 38 mmHg.    Pulmonic Valve: There is mild regurgitation.  . Continue Beta Blocker ACE/ARB Furosemide Aldactone and monitor clinical status closely. Monitor on telemetry. Patient is on CHF pathway.  Monitor strict Is&Os and daily weights.  Place on fluid restriction of 2 L. Cardiology is consulted. Continue to stress to patient importance of self efficacy and  on diet for CHF. Last BNP reviewed- and noted below   Recent Labs   Lab 04/08/25  0211   *       Current Heart Failure Medications  spironolactone tablet 50 mg, 2 times daily, Oral  furosemide injection 40 mg, Every 12 hours, Intravenous  losartan tablet 50 mg, Daily, Oral  spironolactone (ALDACTONE) tablet, 2 times daily, Oral  losartan (COZAAR) tablet, Daily, Oral  furosemide (LASIX) tablet, 2 times daily, Oral      HTN (hypertension)  Patient's blood pressure range in the last 24 hours was: BP  Min: 106/77  Max: 139/85.The patient's inpatient anti-hypertensive regimen is listed  below:  Current Antihypertensives  metoprolol tartrate (LOPRESSOR) tablet 50 mg, 2 times daily, Oral  spironolactone tablet 50 mg, 2 times daily, Oral  furosemide injection 40 mg, Every 12 hours, Intravenous  losartan tablet 50 mg, Daily, Oral  spironolactone (ALDACTONE) tablet, 2 times daily, Oral  losartan (COZAAR) tablet, Daily, Oral  furosemide (LASIX) tablet, 2 times daily, Oral    Plan  Follow cardiology recommendations   Vital Q4h      Depression  Resume Home medication  Vit D low 15: start Vit D supplements   B12 345 wnl  TSH/Free T4, B12, and Folate wnl    Denies SI/HI         Severe obesity (BMI >= 40)  Body mass index is 45.46 kg/m². Morbid obesity complicates all aspects of disease management from diagnostic modalities to treatment. Weight loss encouraged and health benefits explained to patient.       Final Active Diagnoses:    Diagnosis Date Noted POA    PRINCIPAL PROBLEM:  Acute on chronic combined systolic and diastolic congestive heart failure [I50.43] 04/08/2025 Yes    Depression [F32.A] 04/08/2025 Yes    Severe obesity (BMI >= 40) [E66.01] 03/11/2023 Yes    HTN (hypertension) [I10] 03/07/2023 Yes      Problems Resolved During this Admission:       Discharged Condition: stable    Disposition: Home or Self Care    Follow Up:   Follow-up Information       Aida Snow NP Follow up in 1 week(s).    Specialty: Family Medicine  Contact information:  [address]  [address]  [facility] [address]  [phone]               Isidoro Obando MD Follow up in 2 week(s).    Specialties: Interventional Cardiology, Cardiology  Contact information:  [address]  [address]  [place] [address]  [phone]                           Patient Instructions:      Ambulatory referral/consult to Sleep Disorders   Standing Status: Future   Referral Priority: Routine Referral Type: Consultation   Requested Specialty: Sleep Medicine   Number of Visits Requested: 1     Diet Cardiac   Order Comments: Low sodium     Call MD  for:  temperature >100.4     Call MD for:  persistent nausea and vomiting or diarrhea     Call MD for:  severe uncontrolled pain     Call MD for:  redness, tenderness, or signs of infection (pain, swelling, redness, odor or green/yellow discharge around incision site)     Call MD for:  difficulty breathing or increased cough     Call MD for:  severe persistent headache     Call MD for:  worsening rash     Call MD for:  persistent dizziness, light-headedness, or visual disturbances     Call MD for:  increased confusion or weakness     Lifting restrictions   Order Comments: 10 lbs weight lifting restriction       Significant Diagnostic Studies: Labs: CMP   Recent Labs   Lab 04/10/25  0350      K 3.8   CO2 27   BUN 21*   CREATININE 1.4   CALCIUM 9.3   ALBUMIN 3.9   BILITOT 0.9   ALKPHOS 66   AST 13   ALT 21   , CBC   Recent Labs   Lab 04/10/25  0349   WBC 10.60   HGB 14.4   HCT 42.3      , and All labs within the past 24 hours have been reviewed    Pending Diagnostic Studies:       Procedure Component Value Units Date/Time    EXTRA TUBES [[id number]] Collected: 04/09/25 0658    Order Status: Sent Lab Status: In process Updated: 04/09/25 0715    Specimen: Blood, Venous     Narrative:      The following orders were created for panel order EXTRA TUBES.  Procedure                               Abnormality         Status                     ---------                               -----------         ------                     Lavender Top Hold[[id number]]                               In process                   Please view results for these tests on the individual orders.           Medications:  Reconciled Home Medications:      Medication List        START taking these medications      DECARA 1,250 mcg (50,000 unit) capsule  Generic drug: cholecalciferol (vitamin D3)  Take 1 capsule (50,000 Units total) by mouth every 7 days.     furosemide 40 MG tablet  Commonly known as: LASIX  Take 1 tablet (40 mg total)  by mouth 2 (two) times a day.     losartan 50 MG tablet  Commonly known as: COZAAR  Take 1 tablet (50 mg total) by mouth once daily.            CHANGE how you take these medications      spironolactone 50 MG tablet  Commonly known as: ALDACTONE  Take 1 tablet (50 mg total) by mouth 2 (two) times daily.  What changed:   medication strength  how much to take  when to take this            CONTINUE taking these medications      empagliflozin 10 mg tablet  Commonly known as: JARDIANCE  Take 1 tablet (10 mg total) by mouth once daily.     metoprolol tartrate 25 MG tablet  Commonly known as: LOPRESSOR  Take 2 tablets (50 mg total) by mouth 2 (two) times daily.     sertraline 50 MG tablet  Commonly known as: ZOLOFT  Take 1 tablet (50 mg total) by mouth once daily.            STOP taking these medications      ENTRESTO 24-26 mg per tablet  Generic drug: sacubitriL-valsartan              Indwelling Lines/Drains at time of discharge:   Lines/Drains/Airways       None                   Time spent on the discharge of patient: 35 minutes         Boby Farah NP  Department of Hospital Medicine  [facility]

## 2025-04-11 ENCOUNTER — PATIENT OUTREACH (OUTPATIENT)
Dept: FAMILY MEDICINE | Facility: CLINIC | Age: 30
End: 2025-04-11
Payer: COMMERCIAL

## 2025-04-11 ENCOUNTER — TELEPHONE (OUTPATIENT)
Dept: FAMILY MEDICINE | Facility: CLINIC | Age: 30
End: 2025-04-11
Payer: COMMERCIAL

## 2025-04-11 NOTE — TELEPHONE ENCOUNTER
----- Message from Nurse Robledo sent at 4/10/2025  1:58 PM CDT -----  Call patient - needs post-hospital phone call within 2 business days and hospital follow up visit scheduled within 7-14 days.

## 2025-04-11 NOTE — PROGRESS NOTES
Discharge Information     Discharge Date:   4/10/2025    Primary Discharge Diagnosis:  SOB      Discharge Summary:  Reviewed      Medication & Order Review     Were medication changes made or new medications added?   Yes    If so, has the patient filled the prescriptions?  Yes     Was Home Health ordered? No    If so, has Home Health contacted patient and/or initiated services?  No    Name of Home Health Agency? N/A    Durable Medical Equipment ordered?  No     If so, has the DME provider contacted patient and delivered equipment?  N/A    Follow Up               Any problems since discharge? No    How is the patient feeling since returning home?      Have you set up recommended follow up appointments?  (cardiology, surgery, etc.)    Schedule Hospital Follow-up appointment within 7-14 days (preferably 7).      Notes:  Spoke to pt and he stated he is doing okay, Is calling to set up a sleep study, is going to follow up with the cardiologist on 5/07. Has picked up the Zoloft the hospital sent in. Message sent to Aida Patiño

## 2025-04-11 NOTE — TELEPHONE ENCOUNTER
Spoke to pt and he stated he is doing okay, Is calling to set up a sleep study, is going to follow up with the cardiologist on 5/07. Has picked up the Zoloft the hospital sent in. Nothing in the next 2 weeks, do you want to see him? And if so where to put him ?

## 2025-04-13 LAB
OHS QRS DURATION: 86 MS
OHS QTC CALCULATION: 492 MS

## 2025-04-14 NOTE — TELEPHONE ENCOUNTER
----- Message from Eun sent at 4/14/2025  9:28 AM CDT -----  The patient was discharged from [facility] on Thursday. Pt's # [phone] GH

## 2025-04-14 NOTE — TELEPHONE ENCOUNTER
----- Message from Layton sent at 4/11/2025  1:33 PM CDT -----  Type:  Appointment RequestCaller is requesting a  appointment.  Name of Caller:PT Would the patient rather a call back or a response via [facility]? CALL [facility] Call Back Number:[phone] Information: Pt was seen by NP Katherine Farah while being admitted into the hospital, Needs to schedule a sleep study can someone assist him. He doesn't know where to go or which location is the best. I'm in the correct location for him.[facility] and he states '' he was transferred to one location and they were closed at noon and no one told him which location he was being transferred to''.    Please call back to advise. Thanks!

## 2025-04-15 ENCOUNTER — TELEPHONE (OUTPATIENT)
Dept: FAMILY MEDICINE | Facility: CLINIC | Age: 30
End: 2025-04-15
Payer: COMMERCIAL

## 2025-04-15 NOTE — TELEPHONE ENCOUNTER
----- Message from Eun sent at 4/15/2025  9:30 AM CDT -----  The patient was discharged from [facility]  last Thursday. He needs an appointment . He needs a referral for a sleep study. Pt's # [phone] JULIA

## 2025-04-25 ENCOUNTER — OFFICE VISIT (OUTPATIENT)
Dept: FAMILY MEDICINE | Facility: CLINIC | Age: 30
End: 2025-04-25
Payer: COMMERCIAL

## 2025-04-25 VITALS
DIASTOLIC BLOOD PRESSURE: 86 MMHG | HEART RATE: 68 BPM | HEIGHT: 68 IN | SYSTOLIC BLOOD PRESSURE: 138 MMHG | OXYGEN SATURATION: 99 % | WEIGHT: 292 LBS | BODY MASS INDEX: 44.25 KG/M2

## 2025-04-25 DIAGNOSIS — F32.A DEPRESSION, UNSPECIFIED DEPRESSION TYPE: ICD-10-CM

## 2025-04-25 DIAGNOSIS — R29.818 SUSPECTED SLEEP APNEA: ICD-10-CM

## 2025-04-25 DIAGNOSIS — I50.42 CHRONIC COMBINED SYSTOLIC AND DIASTOLIC CONGESTIVE HEART FAILURE: Primary | ICD-10-CM

## 2025-04-25 DIAGNOSIS — I10 HYPERTENSION, UNSPECIFIED TYPE: ICD-10-CM

## 2025-04-25 DIAGNOSIS — R93.1 DECREASED CARDIAC EJECTION FRACTION: ICD-10-CM

## 2025-04-25 PROCEDURE — 99215 OFFICE O/P EST HI 40 MIN: CPT | Mod: S$GLB,,, | Performed by: NURSE PRACTITIONER

## 2025-04-25 PROCEDURE — 3044F HG A1C LEVEL LT 7.0%: CPT | Mod: CPTII,S$GLB,, | Performed by: NURSE PRACTITIONER

## 2025-04-25 PROCEDURE — 3079F DIAST BP 80-89 MM HG: CPT | Mod: CPTII,S$GLB,, | Performed by: NURSE PRACTITIONER

## 2025-04-25 PROCEDURE — 1159F MED LIST DOCD IN RCRD: CPT | Mod: CPTII,S$GLB,, | Performed by: NURSE PRACTITIONER

## 2025-04-25 PROCEDURE — 4010F ACE/ARB THERAPY RXD/TAKEN: CPT | Mod: CPTII,S$GLB,, | Performed by: NURSE PRACTITIONER

## 2025-04-25 PROCEDURE — 3075F SYST BP GE 130 - 139MM HG: CPT | Mod: CPTII,S$GLB,, | Performed by: NURSE PRACTITIONER

## 2025-04-25 PROCEDURE — 1111F DSCHRG MED/CURRENT MED MERGE: CPT | Mod: CPTII,S$GLB,, | Performed by: NURSE PRACTITIONER

## 2025-04-25 PROCEDURE — 1160F RVW MEDS BY RX/DR IN RCRD: CPT | Mod: CPTII,S$GLB,, | Performed by: NURSE PRACTITIONER

## 2025-04-25 PROCEDURE — 3008F BODY MASS INDEX DOCD: CPT | Mod: CPTII,S$GLB,, | Performed by: NURSE PRACTITIONER

## 2025-04-28 ENCOUNTER — TELEPHONE (OUTPATIENT)
Dept: FAMILY MEDICINE | Facility: CLINIC | Age: 30
End: 2025-04-28
Payer: COMMERCIAL

## 2025-04-28 NOTE — TELEPHONE ENCOUNTER
----- Message from Sonya sent at 4/28/2025  4:01 PM CDT -----  Contact: [facility]  [facility] needs a signed order faxed back to them. Fax #[phone]

## 2025-05-07 ENCOUNTER — OFFICE VISIT (OUTPATIENT)
Dept: CARDIOLOGY | Facility: CLINIC | Age: 30
End: 2025-05-07
Payer: COMMERCIAL

## 2025-05-07 ENCOUNTER — TELEPHONE (OUTPATIENT)
Dept: FAMILY MEDICINE | Facility: CLINIC | Age: 30
End: 2025-05-07
Payer: COMMERCIAL

## 2025-05-07 VITALS
HEART RATE: 72 BPM | SYSTOLIC BLOOD PRESSURE: 151 MMHG | WEIGHT: 291 LBS | DIASTOLIC BLOOD PRESSURE: 83 MMHG | BODY MASS INDEX: 44.1 KG/M2 | HEIGHT: 68 IN

## 2025-05-07 DIAGNOSIS — I10 PRIMARY HYPERTENSION: ICD-10-CM

## 2025-05-07 DIAGNOSIS — I50.43 ACUTE ON CHRONIC COMBINED SYSTOLIC AND DIASTOLIC CONGESTIVE HEART FAILURE: Primary | ICD-10-CM

## 2025-05-07 DIAGNOSIS — R29.818 SUSPECTED SLEEP APNEA: ICD-10-CM

## 2025-05-07 DIAGNOSIS — E66.01 SEVERE OBESITY (BMI >= 40): ICD-10-CM

## 2025-05-07 PROCEDURE — 3044F HG A1C LEVEL LT 7.0%: CPT | Mod: CPTII,S$GLB,, | Performed by: NURSE PRACTITIONER

## 2025-05-07 PROCEDURE — 4010F ACE/ARB THERAPY RXD/TAKEN: CPT | Mod: CPTII,S$GLB,, | Performed by: NURSE PRACTITIONER

## 2025-05-07 PROCEDURE — 1159F MED LIST DOCD IN RCRD: CPT | Mod: CPTII,S$GLB,, | Performed by: NURSE PRACTITIONER

## 2025-05-07 PROCEDURE — 99999 PR PBB SHADOW E&M-EST. PATIENT-LVL III: CPT | Mod: PBBFAC,,, | Performed by: NURSE PRACTITIONER

## 2025-05-07 PROCEDURE — 99214 OFFICE O/P EST MOD 30 MIN: CPT | Mod: S$GLB,,, | Performed by: NURSE PRACTITIONER

## 2025-05-07 PROCEDURE — 3008F BODY MASS INDEX DOCD: CPT | Mod: CPTII,S$GLB,, | Performed by: NURSE PRACTITIONER

## 2025-05-07 PROCEDURE — 1111F DSCHRG MED/CURRENT MED MERGE: CPT | Mod: CPTII,S$GLB,, | Performed by: NURSE PRACTITIONER

## 2025-05-07 PROCEDURE — 3079F DIAST BP 80-89 MM HG: CPT | Mod: CPTII,S$GLB,, | Performed by: NURSE PRACTITIONER

## 2025-05-07 PROCEDURE — 3077F SYST BP >= 140 MM HG: CPT | Mod: CPTII,S$GLB,, | Performed by: NURSE PRACTITIONER

## 2025-05-07 RX ORDER — TIRZEPATIDE 2.5 MG/.5ML
2.5 INJECTION, SOLUTION SUBCUTANEOUS
Qty: 2 ML | Refills: 11 | Status: SHIPPED | OUTPATIENT
Start: 2025-05-07

## 2025-05-07 RX ORDER — SACUBITRIL AND VALSARTAN 49; 51 MG/1; MG/1
1 TABLET, FILM COATED ORAL 2 TIMES DAILY
Qty: 180 TABLET | Refills: 3 | Status: SHIPPED | OUTPATIENT
Start: 2025-05-07

## 2025-05-07 NOTE — PROGRESS NOTES
Subjective:    Patient ID:  Wenceslao Jeong is a 29 y.o. male.  Chief Complaint   Patient presents with    Congestive Heart Failure    Hypertension       History of Present Illness    CHIEF COMPLAINT:  Patient presents today for follow-up of cardiac failure management.    HEART FAILURE:  He reports recent hospitalization with brief stay and states feeling improved since discharge. He denies LLE swelling. He has an upcoming appointment with advanced heart failure team scheduled for the 19th of this month.    SLEEP:  He reports snoring at night and has a pending home sleep study.    CURRENT MEDICATIONS:  He is currently taking Furosemide twice daily, Jardiance, and Spironolactone.      ROS:  ROS as indicated in HPI.           Review of patient's allergies indicates:  No Known Allergies    Past Medical History:   Diagnosis Date    CHF (congestive heart failure)     Hypertension      Past Surgical History:   Procedure Laterality Date    LEFT HEART CATHETERIZATION Left 3/14/2023    Procedure: Left heart cath;  Surgeon: Joey Sousa MD;  Location: [facility] CATH/EP LAB;  Service: Cardiology;  Laterality: Left;     Social History[1]  Family History   Problem Relation Name Age of Onset    Diabetes Mother      Heart disease Mother      Cancer Father      Drug abuse Maternal Aunt      Heart disease Maternal Grandmother      Drug abuse Maternal Grandfather      Hypertension Maternal Grandfather            Objective:        Vitals:    05/07/25 0904   BP: (!) 151/83   Pulse: 72       Lab Results   Component Value Date    WBC 10.60 04/10/2025    HGB 14.4 04/10/2025    HCT 42.3 04/10/2025     04/10/2025    CHOL 185 08/01/2023    TRIG 259 (H) 08/01/2023    HDL 70 08/01/2023    ALT 21 04/10/2025    AST 13 04/10/2025     04/10/2025    K 3.8 04/10/2025     04/10/2025    CREATININE 1.4 04/10/2025    BUN 21 (H) 04/10/2025    CO2 27 04/10/2025    TSH 1.930 04/08/2025    INR 1.0 04/08/2025    HGBA1C 5.9 04/08/2025         ECHOCARDIOGRAM RESULTS  Results for orders placed during the hospital encounter of 04/02/25    Echo    Interpretation Summary    Left Ventricle: The left ventricle is moderately dilated. Normal wall thickness. Moderate global hypokinesis present. There is severely reduced systolic function with a visually estimated ejection fraction of 20 - 25%. There is normal diastolic function. E/A ratio is 1.90.    Right Ventricle: The right ventricle is normal in size. Systolic function is normal.    Mitral Valve: There is mild to moderate regurgitation.    Tricuspid Valve: There is mild to moderate regurgitation. There is mild pulmonary hypertension. The estimated PA systolic pressure is at least 38 mmHg.    Pulmonic Valve: There is mild regurgitation.        CURRENT/PREVIOUS VISIT EKG  Results for orders placed or performed during the hospital encounter of 04/08/25   EKG 12-lead    Collection Time: 04/08/25  1:42 AM   Result Value Ref Range    QRS Duration 86 ms    OHS QTC Calculation 492 ms    Narrative    Test Reason : R06.02,    Vent. Rate : 109 BPM     Atrial Rate : 109 BPM     P-R Int : 168 ms          QRS Dur :  86 ms      QT Int : 366 ms       P-R-T Axes :  53  66  26 degrees    QTcB Int : 492 ms    Sinus tachycardia  Possible Left atrial enlargement  Borderline Abnormal ECG  No previous ECGs available  Confirmed by Junior Rosario ([id number]) on 4/13/2025 7:37:10 PM    Referred By: AAAREFERRAL SELF           Confirmed By: Junior Rosario     No valid procedures specified.   No results found for this or any previous visit.      Physical Exam    CONSTITUTIONAL: No fever.  HEENT: Normocephalic. Atraumatic. Pupils reactive to light.  NECK: No JVD. No carotid bruit.  CVS: S1S2+. RRR. No murmurs.  LUNGS: Clear.  ABDOMEN: Soft. NT. BS+.  EXTREMITIES: No cyanosis. No edema.  : No Kee catheter.  NEURO: AAO X 3.  PSY: Normal affect.           Medication List with Changes/Refills   New Medications    SACUBITRIL-VALSARTAN  (ENTRESTO) 49-51 MG PER TABLET    Take 1 tablet by mouth 2 (two) times daily.    TIRZEPATIDE, WEIGHT LOSS, (ZEPBOUND) 2.5 MG/0.5 ML PNIJ    Inject 2.5 mg into the skin every 7 days.   Current Medications    CHOLECALCIFEROL, VITAMIN D3, 1,250 MCG (50,000 UNIT) CAPSULE    Take 1 capsule (50,000 Units total) by mouth every 7 days.    EMPAGLIFLOZIN (JARDIANCE) 10 MG TABLET    Take 1 tablet (10 mg total) by mouth once daily.    FUROSEMIDE (LASIX) 40 MG TABLET    Take 1 tablet (40 mg total) by mouth 2 (two) times a day.    METOPROLOL TARTRATE (LOPRESSOR) 25 MG TABLET    Take 2 tablets (50 mg total) by mouth 2 (two) times daily.    SERTRALINE (ZOLOFT) 50 MG TABLET    Take 1 tablet (50 mg total) by mouth once daily.    SPIRONOLACTONE (ALDACTONE) 50 MG TABLET    Take 1 tablet (50 mg total) by mouth 2 (two) times daily.   Discontinued Medications    LOSARTAN (COZAAR) 50 MG TABLET    Take 1 tablet (50 mg total) by mouth once daily.             Assessment & Plan:       1. Acute on chronic combined systolic and diastolic congestive heart failure    2. Primary hypertension    3. Severe obesity (BMI >= 40)    4. Suspected sleep apnea      Assessment & Plan    SEVERE OBESITY (BMI >= 40):  - Evaluated potential candidacy for heart transplant and explained BMI requirements.  - Patient to focus on weight loss efforts to improve overall health and potential treatment options.  - Started Zepbound (pending insurance approval) for weight loss, once-weekly injections.  - Advised attempting to find online coupons to reduce co-pay costs.    ACUTE ON CHRONIC COMBINED SYSTOLIC AND DIASTOLIC CONGESTIVE HEART FAILURE:  - Considered advanced heart failure treatment options for young patient with weak heart muscle, including heart transplant and ICD.  - Assessed need for ICD to protect against arrhythmias.  - Patient should have seen the advanced heart failure team by next visit.  - Started Entresto 49/51 mg po BID and stop losartan.   - Continued  Lasix twice daily.  - Continued Jardiance.  - Continued spironolactone.  - Ordered BMP to check renal function and electrolytes.  - Ordered BNP to assess heart failure marker.  - Follow up in 1 month to review progress and test results.    PRIMARY HYPERTENSION:  - Started Entresto 49/51 mg po BID and stop losartan.   - Continued spironolactone.    LIFESTYLE CHANGES:  - Patient to focus on weight loss efforts to improve overall health and potential treatment options.    PLAN SUMMARY:  - Ordered BMP to check renal function and electrolytes  - Ordered BNP to assess heart failure marker  - Started Zepbound (pending insurance approval) for weight loss, once-weekly injections  - Started Entresto 49/51 mg po BID and stop losartan.   - Continued Lasix twice daily  - Continued spironolactone  - Continued Jardiance  - Assessed need for ICD to protect against arrhythmias  - Evaluated potential candidacy for heart transplant  - Patient to focus on weight loss efforts  - Follow up in 1 month to review progress and test results  - Patient should have seen the advanced heart failure team by next visit             Follow up in about 4 weeks (around 6/4/2025).    This note was generated with the assistance of ambient listening technology. Verbal consent was obtained by the patient and accompanying visitor(s) for the recording of patient appointment to facilitate this note. I attest to having reviewed and edited the generated note for accuracy, though some syntax or spelling errors may persist. Please contact the author of this note for any clarification.             Zainab Dsouza NP-C  Department of Cardiology   [facility] [place], [place]           [1]   Social History  Tobacco Use    Smoking status: Never    Smokeless tobacco: Never   Substance Use Topics    Alcohol use: Not Currently    Drug use: Not Currently

## 2025-05-07 NOTE — TELEPHONE ENCOUNTER
----- Message from Parul sent at 5/7/2025  9:39 AM CDT -----  Regarding: forms to fill out for pt  Pt dropped off forms to have filled out by Aida  for pt assistance with meds    just let him know its done but fax for him  forms in wall folder at  thanks kbb

## 2025-05-11 NOTE — PROGRESS NOTES
"  SUBJECTIVE:    Patient ID: Wenceslao Jeong is a 29 y.o. male.    Chief Complaint: Hospital Follow Up (No bottles//Pt is here for a hospital follow up on 4/8/25//KE)      History of Present Illness    CHIEF COMPLAINT:  29 year old male presents today for hospital follow up    HEART FAILURE:  He reports improvement in symptoms and ability to breathe deeper since starting diuretic therapy. He maintains adherence to fluid restriction of 1000 mL.    MEDICATIONS:  He is taking losartan as an alternative to Entresto due to insurance coverage. Current medications include Jardiance 10 mg (despite increase to 20 mg), aldactone, losartan, and sertraline.  Hospital:  29-year-old male with history of hypertension, combined systolic and diastolic heart failure 9 TTE 4/2/25 LVEF 20-25% )  , morbid obesity, anemia, diabetes presents to the ER due to ongoing episodes of chest pain shortness of breath with dyspnea on exertion and orthopnea.  Patient reports that he has been sleeping in his recliner because he can not lay flat.  He is short of breath at rest and with minimal ambulation.  He reports this is gradually worsened but that has not significantly worse tonight.  He is intermittent chest pain.  Chronic dry cough.  Trace pitting edema he reports he has been compliant with his home medications in the past month.  That has unsure of what his medications are but reports he is taking Jardiance, Imdur and a diuretic though he is unsure what it is.  He is also very depressed with poor eye contact when asked suicide screening questions by triage he reported " my life is fucking worthless" so he was brought to a psychiatric room but patient reports he is not suicidal or homicidal but that has very depressed    Labs were normal    BNP was in 400 range    CXR shows pulm edema    He was given IV Lasix and diuresed well    ECHO 4/2/2025    Left Ventricle: The left ventricle is moderately dilated. Normal wall thickness. Moderate global " hypokinesis present. There is severely reduced systolic function with a visually estimated ejection fraction of 20 - 25%. There is normal diastolic function. E/A ratio is 1.90.    Right Ventricle: The right ventricle is normal in size. Systolic function is normal.    Mitral Valve: There is mild to moderate regurgitation.    Tricuspid Valve: There is mild to moderate regurgitation. There is mild pulmonary hypertension. The estimated PA systolic pressure is at least 38 mmHg.    Pulmonic Valve: There is mild regurgitation   Hospital Course:   Wenceslao Jeong was closely monitored while in the hospital.  Patient was admitted to Hospital Medicine for acute on chronic combined systolic and diastolic congestive heart failure.  Echocardiogram was performed which revealed an ejection fraction of 20-25%.  Cardiology was consulted.  Was to afford prescribed Entresto and Cardiology made changes to medication.  Patient was switched to and Lasix 40 mg IV b.i.d, losartan, Aldactone, and metoprolol.  Patient was maintained on a low-sodium diet with a 2000 mL fluid restriction. Patient's vital signs stable on room air.  Patient was cleared by Cardiology for discharge.    Patient seen and examined on day of discharge.  Patient in no acute distress.  Chart reviewed.  Patient educated on discharge planning.  Patient was educated on low-sodium fluid restriction diet and that he will need to take his weight daily upon awakening.  He was also educated that if he noticed a 2-3 lb weight change in 1 day or 5 lb in 1 week please contact your primary care provider or cardiologist.  If patient begins to experience any shortness for breath, dyspnea exertion, or chest pain he was informed to report to the emergency department for further evaluation, he verbalized understanding.  Patient requested referral for sleep study, referral ordered.  Patient safely discharged home.    DIABETES:  Hemoglobin A1C was 5.9 as of 4/8, falling within  pre-diabetic range (5.7-6.4).    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      ROS:  General: -fever, -chills, -fatigue, -weight gain, -weight loss  Eyes: -vision changes, -redness, -discharge  ENT: -ear pain, -nasal congestion, -sore throat  Cardiovascular: -chest pain, -palpitations, -lower extremity edema  Respiratory: -cough, -shortness of breath  Gastrointestinal: -abdominal pain, -nausea, -vomiting, -diarrhea, -constipation, -blood in stool  Genitourinary: -dysuria, -hematuria, -frequency  Musculoskeletal: -joint pain, -muscle pain  Skin: -rash, -lesion  Neurological: -headache, -dizziness, -numbness, -tingling  Psychiatric: -anxiety, -depression, -sleep difficulty              Admission on 04/08/2025, Discharged on 04/10/2025   Component Date Value Ref Range Status    Sodium 04/08/2025 138  136 - 145 mmol/L Final    Potassium 04/08/2025 3.8  3.5 - 5.1 mmol/L Final    Chloride 04/08/2025 106  95 - 110 mmol/L Final    CO2 04/08/2025 23  23 - 29 mmol/L Final    Glucose 04/08/2025 135 (H)  70 - 110 mg/dL Final    BUN 04/08/2025 16  6 - 20 mg/dL Final    Creatinine 04/08/2025 1.3  0.5 - 1.4 mg/dL Final    Calcium 04/08/2025 9.0  8.7 - 10.5 mg/dL Final    Protein Total 04/08/2025 7.5  6.0 - 8.4 gm/dL Final    Albumin 04/08/2025 4.2  3.5 - 5.2 g/dL Final    Bilirubin Total 04/08/2025 0.9  0.1 - 1.0 mg/dL Final    ALP 04/08/2025 66  55 - 135 unit/L Final    AST 04/08/2025 23  10 - 40 unit/L Final    ALT 04/08/2025 29  10 - 44 unit/L Final    Anion Gap 04/08/2025 9  8 - 16 mmol/L Final    eGFR 04/08/2025 >60  >60 mL/min/1.73/m2 Final    Troponin High Sensitive 04/08/2025 18.7 (H)  <=14.9 pg/mL Final    BNP 04/08/2025 497 (H)  <=99 pg/mL Final    QRS Duration 04/08/2025 86  ms Final    OHS QTC Calculation 04/08/2025 492  ms Final    PT 04/08/2025 11.5  9.0 - 12.5 seconds Final    INR 04/08/2025 1.0   0.8 - 1.2 Final    Magnesium 04/08/2025 1.9  1.6 - 2.6 mg/dL Final    WBC 04/08/2025 13.71 (H)  3.90 - 12.70 K/uL Final    RBC 04/08/2025 4.99  4.60 - 6.20 M/uL Final    Hgb 04/08/2025 13.6 (L)  14.0 - 18.0 gm/dL Final    Hct 04/08/2025 39.1 (L)  40.0 - 54.0 % Final    MCV 04/08/2025 78 (L)  82 - 98 fL Final    MCH 04/08/2025 27.3  27.0 - 31.0 pg Final    MCHC 04/08/2025 34.8  32.0 - 36.0 g/dL Final    RDW 04/08/2025 15.4 (H)  11.5 - 14.5 % Final    Platelet Count 04/08/2025 272  150 - 450 K/uL Final    MPV 04/08/2025 11.7  9.2 - 12.9 fL Final    Nucleated RBC 04/08/2025 0  <=0 /100 WBC Final    Neut % 04/08/2025 64.1  38 - 73 % Final    Lymph % 04/08/2025 27.5  18 - 48 % Final    Mono % 04/08/2025 6.9  4 - 15 % Final    Eos % 04/08/2025 0.7  0 - 8 % Final    Basophil % 04/08/2025 0.4  <=1.9 % Final    Imm Grans % 04/08/2025 0.4  0.0 - 0.5 % Final    Neut # 04/08/2025 8.8 (H)  1.8 - 7.7 K/uL Final    Lymph # 04/08/2025 3.77  1 - 4.8 K/uL Final    Mono # 04/08/2025 0.95  0.3 - 1 K/uL Final    Eos # 04/08/2025 0.09  <=0.5 K/uL Final    Baso # 04/08/2025 0.06  <=0.2 K/uL Final    Imm Grans # 04/08/2025 0.05 (H)  0.00 - 0.04 K/uL Final    Hemoglobin A1c 04/08/2025 5.9  4.5 - 6.2 % Final    Estimated Average Glucose 04/08/2025 123  68 - 131 mg/dL Final    Vitamin B12 04/08/2025 349  210 - 950 pg/mL Final    Folate Level 04/08/2025 >22.3  4.0 - 24.0 ng/mL Final    Vitamin D 04/08/2025 15 (L)  30 - 96 ng/mL Final    TSH 04/08/2025 1.930  0.340 - 5.600 uIU/mL Final    Free T4 04/08/2025 1.00  0.71 - 1.51 ng/dL Final    Procalcitonin 04/08/2025 0.086  <=0.500 ng/mL Final    Sodium 04/09/2025 139  136 - 145 mmol/L Final    Potassium 04/09/2025 3.8  3.5 - 5.1 mmol/L Final    Chloride 04/09/2025 104  95 - 110 mmol/L Final    CO2 04/09/2025 28  23 - 29 mmol/L Final    Glucose 04/09/2025 114 (H)  70 - 110 mg/dL Final    BUN 04/09/2025 16  6 - 20 mg/dL Final    Creatinine 04/09/2025 1.4  0.5 - 1.4 mg/dL Final    Calcium 04/09/2025  8.9  8.7 - 10.5 mg/dL Final    Protein Total 04/09/2025 7.1  6.0 - 8.4 gm/dL Final    Albumin 04/09/2025 3.8  3.5 - 5.2 g/dL Final    Bilirubin Total 04/09/2025 1.2 (H)  0.1 - 1.0 mg/dL Final    ALP 04/09/2025 65  55 - 135 unit/L Final    AST 04/09/2025 15  10 - 40 unit/L Final    ALT 04/09/2025 22  10 - 44 unit/L Final    Anion Gap 04/09/2025 7 (L)  8 - 16 mmol/L Final    eGFR 04/09/2025 >60  >60 mL/min/1.73/m2 Final    Magnesium 04/09/2025 2.1  1.6 - 2.6 mg/dL Final    Sodium 04/10/2025 139  136 - 145 mmol/L Final    Potassium 04/10/2025 3.8  3.5 - 5.1 mmol/L Final    Chloride 04/10/2025 105  95 - 110 mmol/L Final    CO2 04/10/2025 27  23 - 29 mmol/L Final    Glucose 04/10/2025 116 (H)  70 - 110 mg/dL Final    BUN 04/10/2025 21 (H)  6 - 20 mg/dL Final    Creatinine 04/10/2025 1.4  0.5 - 1.4 mg/dL Final    Calcium 04/10/2025 9.3  8.7 - 10.5 mg/dL Final    Protein Total 04/10/2025 7.3  6.0 - 8.4 gm/dL Final    Albumin 04/10/2025 3.9  3.5 - 5.2 g/dL Final    Bilirubin Total 04/10/2025 0.9  0.1 - 1.0 mg/dL Final    ALP 04/10/2025 66  55 - 135 unit/L Final    AST 04/10/2025 13  10 - 40 unit/L Final    ALT 04/10/2025 21  10 - 44 unit/L Final    Anion Gap 04/10/2025 7 (L)  8 - 16 mmol/L Final    eGFR 04/10/2025 >60  >60 mL/min/1.73/m2 Final    Magnesium 04/10/2025 2.1  1.6 - 2.6 mg/dL Final    WBC 04/10/2025 10.60  3.90 - 12.70 K/uL Final    RBC 04/10/2025 5.30  4.60 - 6.20 M/uL Final    Hgb 04/10/2025 14.4  14.0 - 18.0 gm/dL Final    Hct 04/10/2025 42.3  40.0 - 54.0 % Final    MCV 04/10/2025 80 (L)  82 - 98 fL Final    MCH 04/10/2025 27.2  27.0 - 31.0 pg Final    MCHC 04/10/2025 34.0  32.0 - 36.0 g/dL Final    RDW 04/10/2025 15.5 (H)  11.5 - 14.5 % Final    Platelet Count 04/10/2025 294  150 - 450 K/uL Final    MPV 04/10/2025 12.0  9.2 - 12.9 fL Final    Nucleated RBC 04/10/2025 0  <=0 /100 WBC Final    Neut % 04/10/2025 53.1  38 - 73 % Final    Lymph % 04/10/2025 35.6  18 - 48 % Final    Mono % 04/10/2025 9.0  4 - 15 %  Final    Eos % 04/10/2025 1.4  0 - 8 % Final    Basophil % 04/10/2025 0.4  <=1.9 % Final    Imm Grans % 04/10/2025 0.5  0.0 - 0.5 % Final    Neut # 04/10/2025 5.6  1.8 - 7.7 K/uL Final    Lymph # 04/10/2025 3.77  1 - 4.8 K/uL Final    Mono # 04/10/2025 0.95  0.3 - 1 K/uL Final    Eos # 04/10/2025 0.15  <=0.5 K/uL Final    Baso # 04/10/2025 0.04  <=0.2 K/uL Final    Imm Grans # 04/10/2025 0.05 (H)  0.00 - 0.04 K/uL Final   Hospital Outpatient Visit on 04/02/2025   Component Date Value Ref Range Status    BSA 04/02/2025 2.58  m2 Final    Oglesby's Biplane MOD Ejection Fra* 04/02/2025 33  % Final    A2C EF 04/02/2025 22  % Final    A4C EF 04/02/2025 41  % Final    LVOT stroke volume 04/02/2025 52.7  cm3 Final    LVIDd 04/02/2025 7.6 (A)  3.5 - 6.0 cm Final    LV Systolic Volume 04/02/2025 226  mL Final    LV Systolic Volume Index 04/02/2025 92.2  mL/m2 Final    LVIDs 04/02/2025 6.6 (A)  2.1 - 4.0 cm Final    LV Diastolic Volume 04/02/2025 310  mL Final    LV Diastolic Volume Index 04/02/2025 126.53  mL/m2 Final    LV EDV A2C 04/02/2025 235.154762623580695  mL Final    LV EDV A4C 04/02/2025 253.02  mL Final    Left Ventricular End Systolic Volu* 04/02/2025 226.19  mL Final    Left Ventricular End Diastolic Vol* 04/02/2025 309.82  mL Final    IVS 04/02/2025 1.2 (A)  0.6 - 1.1 cm Final    LVOT diameter 04/02/2025 2.3  cm Final    LVOT area 04/02/2025 4.2  cm2 Final    FS 04/02/2025 13.2 (A)  28 - 44 % Final    Left Ventricle Relative Wall Thick* 04/02/2025 0.32  cm Final    PW 04/02/2025 1.2 (A)  0.6 - 1.1 cm Final    LV mass 04/02/2025 467.4  g Final    LV Mass Index 04/02/2025 190.8  g/m2 Final    MV Peak E Henry 04/02/2025 1.27  m/s Final    TDI LATERAL 04/02/2025 0.06  m/s Final    TDI SEPTAL 04/02/2025 0.05  m/s Final    E/E' ratio 04/02/2025 23  m/s Final    MV Peak A Henry 04/02/2025 0.67  m/s Final    TR Max Henry 04/02/2025 3.1  m/s Final    E/A ratio 04/02/2025 1.90   Final    IVRT 04/02/2025 60  msec Final    E wave  "deceleration time 04/02/2025 124  msec Final    MV "A" wave duration 04/02/2025 [id number]  msec Final    LV SEPTAL E/E' RATIO 04/02/2025 25.4  m/s Final    LV LATERAL E/E' RATIO 04/02/2025 21.2  m/s Final    LVOT peak mino 04/02/2025 0.8  m/s Final    Left Ventricular Outflow Tract Leonela* 04/02/2025 0.55  cm/s Final    Left Ventricular Outflow Tract Leonela* 04/02/2025 1.38  mmHg Final    RV- mendiola basal diam 04/02/2025 2.5  cm Final    RV S' 04/02/2025 16.16  cm/s Final    TAPSE 04/02/2025 2.72  cm Final    RV/LV Ratio 04/02/2025 0.33  cm Final    LA size 04/02/2025 5.1  cm Final    Left Atrium Minor Axis 04/02/2025 6.4  cm Final    Left Atrium Major Axis 04/02/2025 6.9  cm Final    RA Major Axis 04/02/2025 6.44  cm Final    AV mean gradient 04/02/2025 3  mmHg Final    AV peak gradient 04/02/2025 6  mmHg Final    Ao peak mino 04/02/2025 1.2  m/s Final    Ao VTI 04/02/2025 18.4  cm Final    LVOT peak VTI 04/02/2025 12.7  cm Final    AV valve area 04/02/2025 2.9  cm² Final    AV Velocity Ratio 04/02/2025 0.67   Final    AV index (prosthetic) 04/02/2025 0.69   Final    YO by Velocity Ratio 04/02/2025 2.8  cm² Final    Mr max mino 04/02/2025 5.33  m/s Final    MV mean gradient 04/02/2025 3  mmHg Final    MV peak gradient 04/02/2025 7  mmHg Final    MV stenosis pressure 1/2 time 04/02/2025 53.06  ms Final    MV valve area p 1/2 method 04/02/2025 4.15  cm2 Final    MV valve area by continuity eq 04/02/2025 2.35  cm2 Final    MV VTI 04/02/2025 22.4  cm Final    Triscuspid Valve Regurgitation Pea* 04/02/2025 38  mmHg Final    PV PEAK VELOCITY 04/02/2025 0.82  m/s Final    PV peak gradient 04/02/2025 3  mmHg Final    Pulmonary Valve Mean Velocity 04/02/2025 0.62  m/s Final    Ao root annulus 04/02/2025 3.03  cm Final    IVC diameter 04/02/2025 1.34  cm Final    Mean e' 04/02/2025 0.06  m/s Final    ZLVIDS 04/02/2025 -0.82   Final    ZLVIDD 04/02/2025 -4.80   Final    RVDD 04/02/2025 2.49  cm Final    AORTIC VALVE CUSP " SEPERATION 04/02/2025 2.09  cm Final   Admission on 03/11/2025, Discharged on 03/11/2025   Component Date Value Ref Range Status    Hepatitis C Ab 03/11/2025 Negative  Negative Final    HIV 1/2 Ag/Ab 03/11/2025 Negative  Negative Final    WBC 03/11/2025 11.50  3.90 - 12.70 K/uL Final    RBC 03/11/2025 5.25  4.60 - 6.20 M/uL Final    Hemoglobin 03/11/2025 14.2  14.0 - 18.0 g/dL Final    Hematocrit 03/11/2025 40.9  40.0 - 54.0 % Final    MCV 03/11/2025 78 (L)  82 - 98 fL Final    MCH 03/11/2025 27.0  27.0 - 31.0 pg Final    MCHC 03/11/2025 34.7  32.0 - 36.0 g/dL Final    RDW 03/11/2025 14.6 (H)  11.5 - 14.5 % Final    Platelets 03/11/2025 227  150 - 450 K/uL Final    MPV 03/11/2025 12.4  9.2 - 12.9 fL Final    Immature Granulocytes 03/11/2025 0.3  0.0 - 0.5 % Final    Gran # (ANC) 03/11/2025 6.6  1.8 - 7.7 K/uL Final    Immature Grans (Abs) 03/11/2025 0.04  0.00 - 0.04 K/uL Final    Lymph # 03/11/2025 3.9  1.0 - 4.8 K/uL Final    Mono # 03/11/2025 0.8  0.3 - 1.0 K/uL Final    Eos # 03/11/2025 0.1  0.0 - 0.5 K/uL Final    Baso # 03/11/2025 0.05  0.00 - 0.20 K/uL Final    nRBC 03/11/2025 0  0 /100 WBC Final    Gran % 03/11/2025 57.9  38.0 - 73.0 % Final    Lymph % 03/11/2025 33.8  18.0 - 48.0 % Final    Mono % 03/11/2025 7.1  4.0 - 15.0 % Final    Eosinophil % 03/11/2025 0.5  0.0 - 8.0 % Final    Basophil % 03/11/2025 0.4  0.0 - 1.9 % Final    Differential Method 03/11/2025 Automated   Final    Sodium 03/11/2025 142  136 - 145 mmol/L Final    Potassium 03/11/2025 3.4 (L)  3.5 - 5.1 mmol/L Final    Chloride 03/11/2025 103  95 - 110 mmol/L Final    CO2 03/11/2025 29  23 - 29 mmol/L Final    Glucose 03/11/2025 109  70 - 110 mg/dL Final    BUN 03/11/2025 16  6 - 20 mg/dL Final    Creatinine 03/11/2025 1.3  0.5 - 1.4 mg/dL Final    Calcium 03/11/2025 9.2  8.7 - 10.5 mg/dL Final    Total Protein 03/11/2025 7.5  6.0 - 8.4 g/dL Final    Albumin 03/11/2025 4.2  3.5 - 5.2 g/dL Final    Total Bilirubin 03/11/2025 0.8  0.1 - 1.0  mg/dL Final    Alkaline Phosphatase 03/11/2025 66  55 - 135 U/L Final    AST 03/11/2025 24  10 - 40 U/L Final    ALT 03/11/2025 22  10 - 44 U/L Final    eGFR 03/11/2025 >60.0  >60 mL/min/1.73 m^2 Final    Anion Gap 03/11/2025 10  8 - 16 mmol/L Final    BNP 03/11/2025 323 (H)  0 - 99 pg/mL Final    Troponin I High Sensitivity 03/11/2025 27.2 (H)  0.0 - 14.9 pg/mL Final    QRS Duration 03/11/2025 90  ms Final    OHS QTC Calculation 03/11/2025 495  ms Final    D-Dimer 03/11/2025 0.28  0.00 - 0.49 mg/L FEU Final       Past Medical History:   Diagnosis Date    CHF (congestive heart failure)     Hypertension      Past Surgical History:   Procedure Laterality Date    LEFT HEART CATHETERIZATION Left 3/14/2023    Procedure: Left heart cath;  Surgeon: Joey Sousa MD;  Location: [facility] CATH/EP LAB;  Service: Cardiology;  Laterality: Left;     Family History   Problem Relation Name Age of Onset    Diabetes Mother      Heart disease Mother      Cancer Father      Drug abuse Maternal Aunt      Heart disease Maternal Grandmother      Drug abuse Maternal Grandfather      Hypertension Maternal Grandfather         Tests to Keep You Healthy    Last Blood Pressure <= 139/89 (5/7/2025): NO      The CVD Risk score (NATALIO'Agostino, et al., 2008) failed to calculate for the following reasons:    The patient has a prior MI, stroke, CHF, or peripheral vascular disease diagnosis     Marital Status: Single  Alcohol History:  reports that he does not currently use alcohol.  Tobacco History:  reports that he has never smoked. He has never used smokeless tobacco.  Drug History:  reports that he does not currently use drugs.    Health Maintenance Topics with due status: Not Due       Topic Last Completion Date    Influenza Vaccine Not Due    RSV Vaccine (Age 60+ and Pregnant patients) Not Due       There is no immunization history on file for this patient.    Review of patient's allergies indicates:  No Known Allergies  Current  "Medications[1]        Objective:      Vitals:    04/25/25 0940 04/25/25 0944   BP: (!) 142/88 138/86   Pulse: 68    SpO2: 99%    Weight: 132.5 kg (292 lb)    Height: 5' 8" (1.727 m)        Physical Exam  Vitals and nursing note reviewed.   Constitutional:       General: He is not in acute distress.     Appearance: Normal appearance. He is well-developed. He is obese.   HENT:      Head: Normocephalic and atraumatic.      Right Ear: External ear normal.      Left Ear: External ear normal.   Eyes:      Pupils: Pupils are equal, round, and reactive to light.   Neck:      Trachea: No tracheal deviation.   Cardiovascular:      Rate and Rhythm: Normal rate and regular rhythm.      Heart sounds: No murmur heard.     No friction rub. No gallop.   Pulmonary:      Breath sounds: Normal breath sounds. No stridor. No wheezing or rales.   Abdominal:      Palpations: Abdomen is soft. There is no mass.      Tenderness: There is no abdominal tenderness.   Musculoskeletal:         General: No tenderness or deformity.      Cervical back: Neck supple.   Lymphadenopathy:      Cervical: No cervical adenopathy.   Skin:     General: Skin is warm and dry.   Neurological:      Mental Status: He is alert and oriented to person, place, and time.      Coordination: Coordination normal.   Psychiatric:         Thought Content: Thought content normal.            Assessment:       1. Chronic combined systolic and diastolic congestive heart failure    2. Hypertension, unspecified type    3. Depression, unspecified depression type    4. Decreased cardiac ejection fraction    5. Suspected sleep apnea           Assessment & Plan    - Considered Entresto for heart failure management, pending insurance coverage approval through patient assistance program.  - Increased sertraline dose to address persistent mood symptoms.  - Assessed fluid retention management and sodium intake recommendations.  - A1C 5.9 - pre-diabetic range.  - Discussed potential for " weight loss medication (injectable) pending sleep study results and insurance coverage.    CHRONIC SYSTOLIC (CONGESTIVE) HEART FAILURE:  - Monitored the patient's condition, noting recent emergency room visit due to fluid retention.  - Wenceslao is now on a diuretic and reports improved breathing.  - Auscultated the patient's heart.  - Inquired about echo repeat and learned the patient is scheduled to see a cardiologist specialist with the transplant team for the heart failure clinic on May 19th.  - Continued current medications including Jardiance (increased to 10 mg), aldactone, losartan, and Entresto.  - Prescribed Entresto twice daily (morning and evening).  - Instructed the patient to take 2 of current dose until new prescription reflects the increase.  - Instructed the patient to contact the office if experiencing dizziness, shortness of breath, or lightheadedness.  - Requested the patient to complete and return patient assistance program paperwork for medication coverage.  - Suggested that heart function may improve with continued medication use.  - Noted the patient's recent emergency room visit due to fluid retention.  - Prescribed a diuretic.    EDEMA MANAGEMENT:  - Emphasized the importance of sodium restriction in fluid retention management.  - Advised the patient to maintain fluid restriction of 1000 mL daily.  - Advised the patient to maintain fluid restriction of 1000 mL daily, with allowance for increased intake on days with excessive sweating at work.  - Instructed the patient to limit sodium intake to prevent fluid retention.  - Discussed the need to adjust fluid intake on hot work days to compensate for sweating and prevent dehydration.  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    PREDIABETES MANAGEMENT:  - Reviewed the patient's Hemoglobin A1C from 4/8, which was 5.9, placing  them in the pre-diabetic range (5.7 to 6.4).  - Acknowledged that the patient is not yet diabetic, but is in the pre-diabetic range.  - Continued Jardiance 10 mg.  - Recommend whole wheat pastas and egg noodles instead of simple carbohydrates to help maintain stable blood sugar.  - Discussed benefits of whole wheat pasta and egg noodles over simple carbohydrates for nutritional value and glucose stability.  - Ordered referral to nutritionist for dietary guidance and counseling.    SLEEP APNEA EVALUATION:  - Ordered a home sleep study to evaluate for sleep apnea.  - Explained that a confirmed sleep apnea diagnosis could potentially qualify the patient for weight loss medication coverage.    OCCUPATIONAL HEALTH AND WORK-RELATED CONSIDERATIONS:  - Discussed the patient's job, which involves driving and heavy lifting.  - Evaluated the patient's work capacity, noting no shortness of breath during work activities.  - Discussed potential work restrictions with the patient due to heart condition.  - Offered to provide a work note if needed.  - Instructed the patient to contact the office if experiencing fatigue or need for limited   work hours.  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        Plan:       1. Chronic combined systolic and diastolic congestive heart failure  Comments:  discussed importance of meds.    2. Hypertension, unspecified type  Comments:  bp controlled    3. Depression, unspecified depression type  Comments:  zoloft    4. Decreased cardiac ejection fraction  Comments:  follow upw ith cardio    5. Suspected sleep apnea  Comments:  sleep study      Follow up in about 4 weeks (around 5/23/2025), or if symptoms worsen or fail to improve, for medication management.          Counseled on age and gender appropriate medical preventative services, including cancer screenings, immunizations, overall nutritional health, need for a consistent exercise regimen and  an overall push towards maintaining a vigorous and active lifestyle.      This note was generated with the assistance of ambient listening technology. Verbal consent was obtained by the patient and accompanying visitor(s) for the recording of patient appointment to facilitate this note. I attest to having reviewed and edited the generated note for accuracy, though some syntax or spelling errors may persist. Please contact the author of this note for any clarification.   ..Total time spend on encounter: 40-54 minutes. This includes face to face time and non-face to face time preparing to see the patient (eg, review of tests), obtaining and/or reviewing separately obtained history, documenting clinical information in the electronic or other health record, independently interpreting results and communicating results to the patient/family/caregiver, or care coordinator.        5/11/2025 Aida Snow NP         [1]   Current Outpatient Medications:     cholecalciferol, vitamin D3, 1,250 mcg (50,000 unit) capsule, Take 1 capsule (50,000 Units total) by mouth every 7 days., Disp: 4 capsule, Rfl: 2    empagliflozin (JARDIANCE) 10 mg tablet, Take 1 tablet (10 mg total) by mouth once daily., Disp: 90 tablet, Rfl: 3    furosemide (LASIX) 40 MG tablet, Take 1 tablet (40 mg total) by mouth 2 (two) times a day., Disp: 60 tablet, Rfl: 1    metoprolol tartrate (LOPRESSOR) 25 MG tablet, Take 2 tablets (50 mg total) by mouth 2 (two) times daily., Disp: 180 tablet, Rfl: 3    sertraline (ZOLOFT) 50 MG tablet, Take 1 tablet (50 mg total) by mouth once daily., Disp: 30 tablet, Rfl: 11    spironolactone (ALDACTONE) 50 MG tablet, Take 1 tablet (50 mg total) by mouth 2 (two) times daily., Disp: 60 tablet, Rfl: 11    sacubitriL-valsartan (ENTRESTO) 49-51 mg per tablet, Take 1 tablet by mouth 2 (two) times daily., Disp: 180 tablet, Rfl: 3    tirzepatide, weight loss, (ZEPBOUND) 2.5 mg/0.5 mL PnIj, Inject 2.5 mg into the skin every 7 days.,  Disp: 2 mL, Rfl: 11

## 2025-05-13 ENCOUNTER — TELEPHONE (OUTPATIENT)
Dept: FAMILY MEDICINE | Facility: CLINIC | Age: 30
End: 2025-05-13
Payer: COMMERCIAL

## 2025-05-13 NOTE — TELEPHONE ENCOUNTER
Spoke with patient, states he had his sleep study done on Sunday. I let him know we have not received the results yet. They told him they will be faxing it.

## 2025-05-13 NOTE — TELEPHONE ENCOUNTER
----- Message from Kim sent at 5/13/2025  3:19 PM CDT -----  Pt called for the results for his sleep study.[phone]

## 2025-05-14 ENCOUNTER — NUTRITION (OUTPATIENT)
Dept: NUTRITION | Facility: HOSPITAL | Age: 30
End: 2025-05-14
Payer: COMMERCIAL

## 2025-05-14 DIAGNOSIS — Z71.3 DIETARY COUNSELING: Primary | ICD-10-CM

## 2025-05-14 PROCEDURE — 97802 MEDICAL NUTRITION INDIV IN: CPT

## 2025-05-15 ENCOUNTER — TELEPHONE (OUTPATIENT)
Dept: FAMILY MEDICINE | Facility: CLINIC | Age: 30
End: 2025-05-15
Payer: COMMERCIAL

## 2025-05-15 ENCOUNTER — TELEPHONE (OUTPATIENT)
Dept: TRANSPLANT | Facility: CLINIC | Age: 30
End: 2025-05-15
Payer: COMMERCIAL

## 2025-05-15 NOTE — TELEPHONE ENCOUNTER
----- Message from Sonya sent at 5/15/2025 11:29 AM CDT -----  Pt is checking on the paperwork he brought in for medications. Is it done yet. Please advise. Pt wants his sleep study results. Pt #[phone]  ----- Message -----  From: Sonya Aeyrs  Sent: 5/15/2025  11:31 AM CDT  To: Aida Snow Staff    Pt is checking on the paperwork he brought in for medications. Is it done yet. Please advise. Pt #[phone]

## 2025-05-15 NOTE — TELEPHONE ENCOUNTER
Reminder call made to patient regarding appointment(s).  No answer.  Unable to leave a message on voicemail.

## 2025-05-19 ENCOUNTER — OFFICE VISIT (OUTPATIENT)
Dept: TRANSPLANT | Facility: CLINIC | Age: 30
End: 2025-05-19
Payer: COMMERCIAL

## 2025-05-19 VITALS
OXYGEN SATURATION: 92 % | WEIGHT: 291.25 LBS | BODY MASS INDEX: 44.14 KG/M2 | HEART RATE: 79 BPM | SYSTOLIC BLOOD PRESSURE: 176 MMHG | HEIGHT: 68 IN | DIASTOLIC BLOOD PRESSURE: 102 MMHG

## 2025-05-19 DIAGNOSIS — I10 PRIMARY HYPERTENSION: ICD-10-CM

## 2025-05-19 DIAGNOSIS — I43 CARDIOMYOPATHY, HYPERTENSIVE, WITH HEART FAILURE: ICD-10-CM

## 2025-05-19 DIAGNOSIS — I11.0 CARDIOMYOPATHY, HYPERTENSIVE, WITH HEART FAILURE: ICD-10-CM

## 2025-05-19 DIAGNOSIS — I50.40 COMBINED SYSTOLIC AND DIASTOLIC CONGESTIVE HEART FAILURE, UNSPECIFIED HF CHRONICITY: Primary | ICD-10-CM

## 2025-05-19 DIAGNOSIS — I42.8 CARDIOMYOPATHY, NONISCHEMIC: ICD-10-CM

## 2025-05-19 PROCEDURE — 99999 PR PBB SHADOW E&M-EST. PATIENT-LVL IV: CPT | Mod: PBBFAC,,, | Performed by: INTERNAL MEDICINE

## 2025-05-19 RX ORDER — METOPROLOL SUCCINATE 100 MG/1
100 TABLET, EXTENDED RELEASE ORAL NIGHTLY
Qty: 30 TABLET | Refills: 11 | Status: SHIPPED | OUTPATIENT
Start: 2025-05-19

## 2025-05-19 RX ORDER — SACUBITRIL AND VALSARTAN 49; 51 MG/1; MG/1
1 TABLET, FILM COATED ORAL 2 TIMES DAILY
Qty: 180 TABLET | Refills: 3 | Status: SHIPPED | OUTPATIENT
Start: 2025-05-19

## 2025-05-19 NOTE — PROGRESS NOTES
Subjective:       HPI:  Mr. Jeong is a 29 y.o. black male with stage C HFrEF, NICMP, Uncontrolled HTn who is referred by our colleague Zainab Dsouza for evaluation and management of CHF. Clinically reports NYHA class I-II symptoms. No PND or orthopnea. No recent HF admissions. He was recently switched to Entresto however owing to high cost he was not able to start it. Current HF regimen includes; metoprolol tartrate 50 mg BID, empagliflozin 10 mg daily and spironolactone 50 mg daily. Echo done last month showed severely depressed LV function with an estimated EF=20-25%. Of note his BP remains significantly elevated.     2D Echo with CFD done on 4/2/2025    Left Ventricle: The left ventricle is moderately dilated. Normal wall thickness. Moderate global hypokinesis present. There is severely reduced systolic function with a visually estimated ejection fraction of 20 - 25%. There is normal diastolic function. E/A ratio is 1.90.    Right Ventricle: The right ventricle is normal in size. Systolic function is normal.    Mitral Valve: There is mild to moderate regurgitation.    Tricuspid Valve: There is mild to moderate regurgitation. There is mild pulmonary hypertension. The estimated PA systolic pressure is at least 38 mmHg.    Pulmonic Valve: There is mild regurgitation.    Past Medical History:   Diagnosis Date    CHF (congestive heart failure)     Hypertension      Past Surgical History:   Procedure Laterality Date    LEFT HEART CATHETERIZATION Left 3/14/2023    Procedure: Left heart cath;  Surgeon: Joey Sousa MD;  Location: [facility] CATH/EP LAB;  Service: Cardiology;  Laterality: Left;       Review of Systems   Constitutional: Negative. Negative for chills, decreased appetite, diaphoresis, fever, malaise/fatigue, night sweats, weight gain and weight loss.   Eyes: Negative.    Cardiovascular:  Negative for chest pain, claudication, cyanosis, dyspnea on exertion, irregular heartbeat, leg swelling, near-syncope,  "orthopnea, palpitations, paroxysmal nocturnal dyspnea and syncope.   Respiratory:  Negative for cough, hemoptysis and shortness of breath.    Endocrine: Negative.    Hematologic/Lymphatic: Negative.    Skin:  Negative for color change, dry skin and nail changes.   Musculoskeletal: Negative.    Gastrointestinal: Negative.    Genitourinary: Negative.    Neurological:  Negative for weakness.       Objective:   Blood pressure (!) 176/102, pulse 79, height 5' 8" (1.727 m), weight 132.1 kg (291 lb 3.6 oz), SpO2 (!) 92%.body mass index is 44.28 kg/m².  Physical Exam  Vitals reviewed.   Constitutional:       Appearance: He is well-developed.      Comments: BP (!) 176/102 (BP Location: Right arm, Patient Position: Sitting)   Pulse 79   Ht 5' 8" (1.727 m)   Wt 132.1 kg (291 lb 3.6 oz)   SpO2 (!) 92%   BMI 44.28 kg/m²      HENT:      Head: Normocephalic.   Neck:      Vascular: No carotid bruit or JVD.   Cardiovascular:      Rate and Rhythm: Regular rhythm.      Chest Wall: PMI is displaced.      Pulses: Normal pulses.      Heart sounds: Normal heart sounds. No murmur heard.  Pulmonary:      Effort: Pulmonary effort is normal.      Breath sounds: Normal breath sounds.   Abdominal:      General: Bowel sounds are normal.      Palpations: Abdomen is soft.   Skin:     General: Skin is warm.   Neurological:      Mental Status: He is alert.         Labs:    Chemistry        Component Value Date/Time     04/10/2025 0350    K 3.8 04/10/2025 0350     04/10/2025 0350    CO2 27 04/10/2025 0350    BUN 21 (H) 04/10/2025 0350    CREATININE 1.4 04/10/2025 0350     (H) 04/10/2025 0350        Component Value Date/Time    CALCIUM 9.3 04/10/2025 0350    ALKPHOS 66 04/10/2025 0350    AST 13 04/10/2025 0350    ALT 21 04/10/2025 0350    BILITOT 0.9 04/10/2025 0350          Magnesium   Date Value Ref Range Status   04/10/2025 2.1 1.6 - 2.6 mg/dL Final     Lab Results   Component Value Date    WBC 10.60 04/10/2025    HGB 14.4 " 04/10/2025    HCT 42.3 04/10/2025     04/10/2025     Lab Results   Component Value Date    INR 1.0 04/08/2025    INR 1.1 03/13/2023    PROTIME 11.5 04/08/2025     BNP   Date Value Ref Range Status   04/08/2025 497 (H) <=99 pg/mL Final     Comment:     Values of less than 100 pg/ml are consistent with non-CHF populations.   03/11/2025 323 (H) 0 - 99 pg/mL Final     Comment:     Values of less than 100 pg/ml are consistent with non-CHF populations.   03/10/2023 135 (H) 0 - 99 pg/mL Final     Comment:     Values of less than 100 pg/ml are consistent with non-CHF populations.   03/07/2023 497 (H) 0 - 99 pg/mL Final     Comment:     Values of less than 100 pg/ml are consistent with non-CHF populations.         Assessment:      1. Combined systolic and diastolic congestive heart failure, unspecified HF chronicity    2. Cardiomyopathy, hypertensive, with heart failure    3. Cardiomyopathy, nonischemic    4. Primary hypertension        Plan:   Stage C HFrEF with NYHA class I-II symptoms. Etiology is likely hypertensive CMP. Recommend optimization of GDMT.   Start Entresto 49/51 mg twice daily (prescription sent to [facility]).  DC metoprolol tartrate  and start metoprolol succinate 100 mg daily (to take at night)  Continue Spironolactone and Empagliflozin  Recommend optimization of GDMT with good BP control before reassessing LV EF and need of ICD for primary prevention.   At this time he does not need advanced HF therapies (medically stable) but his BMI would preclude advanced HF therapies should he need it. He was started on Zepbound and is working on his diet.    Recommend to complete workup for BRIANNA as this could explain his resistant HTN.   Recommend 2 gram sodium restriction and 1500cc fluid restriction.  Encourage physical activity with graded exercise program.  Requested patient to weigh themselves daily, and to notify us if their weight increases by more than 3 lbs in 1 day or 5 lbs in 1 week.   RTC in  3 months     Advance Care Planning   Patient ACP addressed and completed in clinic.   Date: 05/19/2025         Sonali Mckenzie MD         f/u AM lipid panel  - Continue atorva 40mg QHS    F: None  E: Replete if K<4 or Mag<2  N: DASH Diet  GIppx:   VTEppx:   Dispo:  PT: Pending consult f/u AM lipid panel  - Continue atorva 40mg QHS (TIC for Rosuvastatin 10mg)    F: None  E: Replete if K<4 or Mag<2  N: DASH Diet  VTEppx:   Dispo: Pending euvolemia /79 on admission, hadn't taken any BP meds today  - Continue meds as above

## 2025-05-19 NOTE — PATIENT INSTRUCTIONS
your prescription for Entresto  at [facility]  Discontinue metoprolol tartrate and start Metoprolol succinate 100 mg to take every night

## 2025-05-23 ENCOUNTER — TELEPHONE (OUTPATIENT)
Dept: FAMILY MEDICINE | Facility: CLINIC | Age: 30
End: 2025-05-23
Payer: COMMERCIAL

## 2025-05-23 NOTE — TELEPHONE ENCOUNTER
----- Message from Claritza sent at 5/23/2025 11:43 AM CDT -----  Patient states he recently had a sleep study completed. Asking if there is anyway that he could know the status of his c pap machine. Diagnosed with sleep apnea [phone]

## 2025-05-30 ENCOUNTER — TELEPHONE (OUTPATIENT)
Dept: FAMILY MEDICINE | Facility: CLINIC | Age: 30
End: 2025-05-30
Payer: COMMERCIAL

## 2025-06-03 ENCOUNTER — TELEPHONE (OUTPATIENT)
Dept: FAMILY MEDICINE | Facility: CLINIC | Age: 30
End: 2025-06-03
Payer: COMMERCIAL

## 2025-06-09 RX ORDER — FUROSEMIDE 40 MG/1
40 TABLET ORAL 2 TIMES DAILY
Qty: 60 TABLET | Refills: 1 | Status: SHIPPED | OUTPATIENT
Start: 2025-06-09

## 2025-06-11 ENCOUNTER — OFFICE VISIT (OUTPATIENT)
Dept: FAMILY MEDICINE | Facility: CLINIC | Age: 30
End: 2025-06-11
Payer: COMMERCIAL

## 2025-06-11 ENCOUNTER — TELEPHONE (OUTPATIENT)
Dept: FAMILY MEDICINE | Facility: CLINIC | Age: 30
End: 2025-06-11

## 2025-06-11 VITALS
SYSTOLIC BLOOD PRESSURE: 124 MMHG | BODY MASS INDEX: 43.44 KG/M2 | HEIGHT: 68 IN | WEIGHT: 286.63 LBS | DIASTOLIC BLOOD PRESSURE: 72 MMHG | OXYGEN SATURATION: 98 % | HEART RATE: 63 BPM

## 2025-06-11 DIAGNOSIS — G47.30 SLEEP APNEA, UNSPECIFIED TYPE: ICD-10-CM

## 2025-06-11 DIAGNOSIS — F32.0 CURRENT MILD EPISODE OF MAJOR DEPRESSIVE DISORDER WITHOUT PRIOR EPISODE: ICD-10-CM

## 2025-06-11 DIAGNOSIS — I10 PRIMARY HYPERTENSION: ICD-10-CM

## 2025-06-11 DIAGNOSIS — I50.43 ACUTE ON CHRONIC COMBINED SYSTOLIC AND DIASTOLIC CONGESTIVE HEART FAILURE: ICD-10-CM

## 2025-06-11 DIAGNOSIS — E66.9 OBESITY, UNSPECIFIED CLASS, UNSPECIFIED OBESITY TYPE, UNSPECIFIED WHETHER SERIOUS COMORBIDITY PRESENT: Primary | ICD-10-CM

## 2025-06-11 RX ORDER — SERTRALINE HYDROCHLORIDE 100 MG/1
100 TABLET, FILM COATED ORAL DAILY
Qty: 30 TABLET | Refills: 11 | Status: SHIPPED | OUTPATIENT
Start: 2025-06-11 | End: 2026-06-11

## 2025-06-11 RX ORDER — TIRZEPATIDE 5 MG/.5ML
5 INJECTION, SOLUTION SUBCUTANEOUS
Qty: 2 ML | Refills: 2 | Status: SHIPPED | OUTPATIENT
Start: 2025-06-11

## 2025-06-11 NOTE — TELEPHONE ENCOUNTER
----- Message from Gibson sent at 6/11/2025  1:39 PM CDT -----  Pt was seen today and needs a 6 week f/u appt.[phone]

## 2025-06-18 ENCOUNTER — OFFICE VISIT (OUTPATIENT)
Dept: CARDIOLOGY | Facility: CLINIC | Age: 30
End: 2025-06-18
Payer: COMMERCIAL

## 2025-06-18 VITALS
SYSTOLIC BLOOD PRESSURE: 132 MMHG | BODY MASS INDEX: 43.44 KG/M2 | HEART RATE: 63 BPM | OXYGEN SATURATION: 97 % | DIASTOLIC BLOOD PRESSURE: 83 MMHG | HEIGHT: 68 IN | WEIGHT: 286.63 LBS

## 2025-06-18 DIAGNOSIS — G47.30 SLEEP APNEA, UNSPECIFIED TYPE: ICD-10-CM

## 2025-06-18 DIAGNOSIS — I10 PRIMARY HYPERTENSION: ICD-10-CM

## 2025-06-18 DIAGNOSIS — I50.42 CHRONIC COMBINED SYSTOLIC AND DIASTOLIC CONGESTIVE HEART FAILURE: Primary | ICD-10-CM

## 2025-06-18 DIAGNOSIS — E66.01 SEVERE OBESITY (BMI >= 40): ICD-10-CM

## 2025-06-18 PROCEDURE — 1159F MED LIST DOCD IN RCRD: CPT | Mod: CPTII,S$GLB,, | Performed by: NURSE PRACTITIONER

## 2025-06-18 PROCEDURE — 3008F BODY MASS INDEX DOCD: CPT | Mod: CPTII,S$GLB,, | Performed by: NURSE PRACTITIONER

## 2025-06-18 PROCEDURE — 4010F ACE/ARB THERAPY RXD/TAKEN: CPT | Mod: CPTII,S$GLB,, | Performed by: NURSE PRACTITIONER

## 2025-06-18 PROCEDURE — 3079F DIAST BP 80-89 MM HG: CPT | Mod: CPTII,S$GLB,, | Performed by: NURSE PRACTITIONER

## 2025-06-18 PROCEDURE — 99999 PR PBB SHADOW E&M-EST. PATIENT-LVL III: CPT | Mod: PBBFAC,,, | Performed by: NURSE PRACTITIONER

## 2025-06-18 PROCEDURE — 3044F HG A1C LEVEL LT 7.0%: CPT | Mod: CPTII,S$GLB,, | Performed by: NURSE PRACTITIONER

## 2025-06-18 PROCEDURE — 99214 OFFICE O/P EST MOD 30 MIN: CPT | Mod: S$GLB,,, | Performed by: NURSE PRACTITIONER

## 2025-06-18 PROCEDURE — 3075F SYST BP GE 130 - 139MM HG: CPT | Mod: CPTII,S$GLB,, | Performed by: NURSE PRACTITIONER

## 2025-06-18 NOTE — ASSESSMENT & PLAN NOTE
Euvolemic and doing well on medical therapy.   Continue Jardiance 10 mg po daily, furosemide 40 mg po BID, metoprolol succinate 100 mg po daily, spironolactone 50 mg po BID, and Entresto 49-51 mg po BID.

## 2025-06-18 NOTE — PROGRESS NOTES
"Diagnosis/Reason for Referral: Heart failure  Medical Nutrition Prescription: Medical Nutrition Therapy        Anthropometrics  Ht Readings from Last 1 Encounters:   05/14/25 5' 8" (1.727 m)     Wt Readings from Last 1 Encounters:   05/14/25 132 kg (291 lb 0.1 oz)      Caloric needs: 5936-0193 (15-20 kcal/kg)  Protein needs: 86-108gm (1.2-1.5g/kg IBW based on BMI 24- 72kg)    Medications:Pertinent Medications Reviewed    Labs: Pertinent Labs Reviewed    Potential food/medication interaction: none    Weight History:  Wt Readings from Last 10 Encounters:   06/18/25 130 kg (286 lb 9.6 oz)   06/11/25 130 kg (286 lb 9.6 oz)   05/19/25 132.1 kg (291 lb 3.6 oz)   05/07/25 132 kg (291 lb 0.1 oz)   04/25/25 132.5 kg (292 lb)   04/10/25 135.6 kg (299 lb)   03/26/25 (!) 138.5 kg (305 lb 6.4 oz)   03/11/25 135.6 kg (299 lb)     Exercise: limited d/t HF    Nutrition History    Food allergies/intolerances: none    Meal patterns: 3 meals daily, snacks in between meals    Meal preparation/shopping: patient cooks    Dining out: limiting fast food    Nutrition beverages:  1L fluid restriction; juice, [place], almond milk, pomegranate juice      24hr recall:   BF: orange  Lunch: turkey breast on whole wheat bread with swiss cheese  Dinner: turkey breast on whole wheat bread with swiss cheese  Snacks: Fig Cifuentes    Support system: family    Lifestyle/cultural/family influence: none    NFPE: N/A    Nutrition Diagnosis PES Statement: Food- and nutrition-related knowledge deficit related to use for new education as evidenced by hx heart failure and inquiring more education d/t recent hospitalization    Nutrition Diagnosis Status:   New    Reviewed:    Nutrition and meal planning  Heart failure nutrition therapy  Food label reading  Fluid restriction nutrition therapy  Low sodium seasoning ideas    Comments:  Patient reports that he is avoiding eating out but feels very limited on what to eat. He is making turkey sandwiches daily and " eating it 2x/day. We discussed foods to add, importance of weighing daily and follow up with MD if weight goes up, tips on eating out and using plate method for meal ideas. Patient voiced understanding and asked pertinent questions which were addressed.    Handouts on the above information were given and contact information provided. Patient will call for follow up appointment if needed.      Motivation: high     Goals: 1. Continue low sodium diet   2. Increase intake of fruits and vegetables     Thank you for the referral.     Laura Pope, MS, RD/LDN, [facility]

## 2025-06-18 NOTE — ASSESSMENT & PLAN NOTE
He is on Zepbound and doing well on therapy. He is losing weight and will continue ongoing efforts for weight management.

## 2025-06-18 NOTE — PROGRESS NOTES
Subjective:    Patient ID:  Wenceslao Jeong is a 30 y.o. male.  Chief Complaint   Patient presents with    Follow-up     1 month f/u no issue stated by pt       HPI:    Patient presents today for follow-up appointment. He has recently seen advanced heart failure. Patient has no complaints of chest pain, dizziness, shortness of breath, palpitations, or syncope.  Patient has been doing well and taking medications as ordered.  Denies any falls or head injuries.  Denies any blood in the stool or in the urine.        Review of patient's allergies indicates:  No Known Allergies    Past Medical History:   Diagnosis Date    CHF (congestive heart failure)     Hypertension      Past Surgical History:   Procedure Laterality Date    LEFT HEART CATHETERIZATION Left 3/14/2023    Procedure: Left heart cath;  Surgeon: Joey Sousa MD;  Location: [facility] CATH/EP LAB;  Service: Cardiology;  Laterality: Left;     Social History[1]  Family History   Problem Relation Name Age of Onset    Diabetes Mother      Heart disease Mother      Cancer Father      Drug abuse Maternal Aunt      Heart disease Maternal Grandmother      Drug abuse Maternal Grandfather      Hypertension Maternal Grandfather          Review of Systems:   Per HPI         Objective:        Vitals:    06/18/25 0936   BP: 132/83   Pulse: 63       Lab Results   Component Value Date    WBC 10.60 04/10/2025    HGB 14.4 04/10/2025    HCT 42.3 04/10/2025     04/10/2025    CHOL 185 08/01/2023    TRIG 259 (H) 08/01/2023    HDL 70 08/01/2023    ALT 21 04/10/2025    AST 13 04/10/2025     04/10/2025    K 3.8 04/10/2025     04/10/2025    CREATININE 1.4 04/10/2025    BUN 21 (H) 04/10/2025    CO2 27 04/10/2025    TSH 1.930 04/08/2025    INR 1.0 04/08/2025    HGBA1C 5.9 04/08/2025        ECHOCARDIOGRAM RESULTS  Results for orders placed during the hospital encounter of 04/02/25    Echo    Interpretation Summary    Left Ventricle: The left ventricle is moderately  dilated. Normal wall thickness. Moderate global hypokinesis present. There is severely reduced systolic function with a visually estimated ejection fraction of 20 - 25%. There is normal diastolic function. E/A ratio is 1.90.    Right Ventricle: The right ventricle is normal in size. Systolic function is normal.    Mitral Valve: There is mild to moderate regurgitation.    Tricuspid Valve: There is mild to moderate regurgitation. There is mild pulmonary hypertension. The estimated PA systolic pressure is at least 38 mmHg.    Pulmonic Valve: There is mild regurgitation.        CURRENT/PREVIOUS VISIT EKG  Results for orders placed or performed during the hospital encounter of 04/08/25   EKG 12-lead    Collection Time: 04/08/25  1:42 AM   Result Value Ref Range    QRS Duration 86 ms    [facility] QTC Calculation 492 ms    Narrative    Test Reason : R06.02,    Vent. Rate : 109 BPM     Atrial Rate : 109 BPM     P-R Int : 168 ms          QRS Dur :  86 ms      QT Int : 366 ms       P-R-T Axes :  53  66  26 degrees    QTcB Int : 492 ms    Sinus tachycardia  Possible Left atrial enlargement  Borderline Abnormal ECG  No previous ECGs available  Confirmed by Junior Rosario ([id number]) on 4/13/2025 7:37:10 PM    Referred By: AAAREFERRAL SELF           Confirmed By: Junior Rosario     No valid procedures specified.   No results found for this or any previous visit.      Physical Exam:  CONSTITUTIONAL: No fever, no chills  HEENT: Normocephalic, atraumatic,pupils reactive to light                 NECK:  No JVD no carotid bruit  CVS: S1S2+, RRR, no murmurs,   LUNGS: Clear  ABDOMEN: Soft, NT, BS+  EXTREMITIES: No cyanosis, edema  : No serna catheter  NEURO: AAO X 3  PSY: Normal affect      Medication List with Changes/Refills   Current Medications    CHOLECALCIFEROL, VITAMIN D3, 1,250 MCG (50,000 UNIT) CAPSULE    Take 1 capsule (50,000 Units total) by mouth every 7 days.    EMPAGLIFLOZIN (JARDIANCE) 10 MG TABLET    Take 1 tablet (10 mg total) by  mouth once daily.    FUROSEMIDE (LASIX) 40 MG TABLET    Take 1 tablet (40 mg total) by mouth 2 (two) times a day.    METOPROLOL SUCCINATE (TOPROL-XL) 100 MG 24 HR TABLET    Take 1 tablet (100 mg total) by mouth nightly.    SACUBITRIL-VALSARTAN (ENTRESTO) 49-51 MG PER TABLET    Take 1 tablet by mouth 2 (two) times daily.    SERTRALINE (ZOLOFT) 100 MG TABLET    Take 1 tablet (100 mg total) by mouth once daily.    SPIRONOLACTONE (ALDACTONE) 50 MG TABLET    Take 1 tablet (50 mg total) by mouth 2 (two) times daily.    TIRZEPATIDE, WEIGHT LOSS, (ZEPBOUND) 5 MG/0.5 ML PNIJ    Inject 5 mg into the skin every 7 days.   Discontinued Medications    SERTRALINE (ZOLOFT) 50 MG TABLET    Take 1 tablet (50 mg total) by mouth once daily.             Assessment:       1. Chronic combined systolic and diastolic congestive heart failure    2. Primary hypertension    3. Severe obesity (BMI >= 40)    4. Sleep apnea, unspecified type         Plan:     Problem List Items Addressed This Visit          Unprioritized    HTN (hypertension)    Current Assessment & Plan   BP stable and well controlled on current regimen.          Chronic combined systolic and diastolic congestive heart failure - Primary    Current Assessment & Plan   Euvolemic and doing well on medical therapy.   Continue Jardiance 10 mg po daily, furosemide 40 mg po BID, metoprolol succinate 100 mg po daily, spironolactone 50 mg po BID, and Entresto 49-51 mg po BID.          Relevant Orders    Echo    Severe obesity (BMI >= 40)    Current Assessment & Plan   He is on Zepbound and doing well on therapy. He is losing weight and will continue ongoing efforts for weight management.          Sleep apnea    Current Assessment & Plan   Using Cpap at home without issue.             Follow up in about 3 months (around 9/18/2025).            DENNIS Jiménez  Department of Cardiology   [facility]- [place], [place]           [1]   Social History  Tobacco Use    Smoking status: Never     Smokeless tobacco: Never   Substance Use Topics    Alcohol use: Not Currently    Drug use: Not Currently

## 2025-06-27 NOTE — PROGRESS NOTES
SUBJECTIVE:    Patient ID: Wenceslao Jeong is a 30 y.o. male.    Chief Complaint: Follow-up (No bottles//Pt is here for a 6 week follow up//KE)      History of Present Illness    CHIEF COMPLAINT:  30 year old male presents today for follow up.    WEIGHT MANAGEMENT:  He reports significant weight loss following a plant-based diet with incorporation of meat alternatives. He continues Zepbound 2.5mg weekly on Mondays with good response.    SLEEP:  He is still awaiting delivery of his CPAP machine despite multiple attempts to contact the medical supply office.    MEDICATIONS:  He was recently restarted on Entresto. He continues Sertraline but reports difficulty gauging effectiveness due to current life stressors.    SOCIAL HISTORY:  He reports experiencing difficulties with work, personal life, and home life.      ROS:  General: -fever, -chills, -fatigue, -weight gain, -weight loss  Eyes: -vision changes, -redness, -discharge  ENT: -ear pain, -nasal congestion, -sore throat  Cardiovascular: -chest pain, -palpitations, -lower extremity edema  Respiratory: -cough, -shortness of breath, +intermittent breathing while sleeping, +apnea  Gastrointestinal: -abdominal pain, -nausea, -vomiting, -diarrhea, -constipation, -blood in stool  Genitourinary: -dysuria, -hematuria, -frequency  Musculoskeletal: -joint pain, -muscle pain  Skin: -rash, -lesion  Neurological: -headache, -dizziness, -numbness, -tingling  Psychiatric: -anxiety, +depression, -sleep difficulty              Admission on 04/08/2025, Discharged on 04/10/2025   Component Date Value Ref Range Status    Sodium 04/08/2025 138  136 - 145 mmol/L Final    Potassium 04/08/2025 3.8  3.5 - 5.1 mmol/L Final    Chloride 04/08/2025 106  95 - 110 mmol/L Final    CO2 04/08/2025 23  23 - 29 mmol/L Final    Glucose 04/08/2025 135 (H)  70 - 110 mg/dL Final    BUN 04/08/2025 16  6 - 20 mg/dL Final    Creatinine 04/08/2025 1.3  0.5 - 1.4 mg/dL Final    Calcium 04/08/2025 9.0  8.7 -  10.5 mg/dL Final    Protein Total 04/08/2025 7.5  6.0 - 8.4 gm/dL Final    Albumin 04/08/2025 4.2  3.5 - 5.2 g/dL Final    Bilirubin Total 04/08/2025 0.9  0.1 - 1.0 mg/dL Final    ALP 04/08/2025 66  55 - 135 unit/L Final    AST 04/08/2025 23  10 - 40 unit/L Final    ALT 04/08/2025 29  10 - 44 unit/L Final    Anion Gap 04/08/2025 9  8 - 16 mmol/L Final    eGFR 04/08/2025 >60  >60 mL/min/1.73/m2 Final    Troponin High Sensitive 04/08/2025 18.7 (H)  <=14.9 pg/mL Final    BNP 04/08/2025 497 (H)  <=99 pg/mL Final    QRS Duration 04/08/2025 86  ms Final    [facility] QTC Calculation 04/08/2025 492  ms Final    PT 04/08/2025 11.5  9.0 - 12.5 seconds Final    INR 04/08/2025 1.0  0.8 - 1.2 Final    Magnesium 04/08/2025 1.9  1.6 - 2.6 mg/dL Final    WBC 04/08/2025 13.71 (H)  3.90 - 12.70 K/uL Final    RBC 04/08/2025 4.99  4.60 - 6.20 M/uL Final    Hgb 04/08/2025 13.6 (L)  14.0 - 18.0 gm/dL Final    Hct 04/08/2025 39.1 (L)  40.0 - 54.0 % Final    MCV 04/08/2025 78 (L)  82 - 98 fL Final    MCH 04/08/2025 27.3  27.0 - 31.0 pg Final    MCHC 04/08/2025 34.8  32.0 - 36.0 g/dL Final    RDW 04/08/2025 15.4 (H)  11.5 - 14.5 % Final    Platelet Count 04/08/2025 272  150 - 450 K/uL Final    MPV 04/08/2025 11.7  9.2 - 12.9 fL Final    Nucleated RBC 04/08/2025 0  <=0 /100 WBC Final    Neut % 04/08/2025 64.1  38 - 73 % Final    Lymph % 04/08/2025 27.5  18 - 48 % Final    Mono % 04/08/2025 6.9  4 - 15 % Final    Eos % 04/08/2025 0.7  0 - 8 % Final    Basophil % 04/08/2025 0.4  <=1.9 % Final    Imm Grans % 04/08/2025 0.4  0.0 - 0.5 % Final    Neut # 04/08/2025 8.8 (H)  1.8 - 7.7 K/uL Final    Lymph # 04/08/2025 3.77  1 - 4.8 K/uL Final    Mono # 04/08/2025 0.95  0.3 - 1 K/uL Final    Eos # 04/08/2025 0.09  <=0.5 K/uL Final    Baso # 04/08/2025 0.06  <=0.2 K/uL Final    Imm Grans # 04/08/2025 0.05 (H)  0.00 - 0.04 K/uL Final    Hemoglobin A1c 04/08/2025 5.9  4.5 - 6.2 % Final    Estimated Average Glucose 04/08/2025 123  68 - 131 mg/dL Final     Vitamin B12 04/08/2025 349  210 - 950 pg/mL Final    Folate Level 04/08/2025 >22.3  4.0 - 24.0 ng/mL Final    Vitamin D 04/08/2025 15 (L)  30 - 96 ng/mL Final    TSH 04/08/2025 1.930  0.340 - 5.600 uIU/mL Final    Free T4 04/08/2025 1.00  0.71 - 1.51 ng/dL Final    Procalcitonin 04/08/2025 0.086  <=0.500 ng/mL Final    Sodium 04/09/2025 139  136 - 145 mmol/L Final    Potassium 04/09/2025 3.8  3.5 - 5.1 mmol/L Final    Chloride 04/09/2025 104  95 - 110 mmol/L Final    CO2 04/09/2025 28  23 - 29 mmol/L Final    Glucose 04/09/2025 114 (H)  70 - 110 mg/dL Final    BUN 04/09/2025 16  6 - 20 mg/dL Final    Creatinine 04/09/2025 1.4  0.5 - 1.4 mg/dL Final    Calcium 04/09/2025 8.9  8.7 - 10.5 mg/dL Final    Protein Total 04/09/2025 7.1  6.0 - 8.4 gm/dL Final    Albumin 04/09/2025 3.8  3.5 - 5.2 g/dL Final    Bilirubin Total 04/09/2025 1.2 (H)  0.1 - 1.0 mg/dL Final    ALP 04/09/2025 65  55 - 135 unit/L Final    AST 04/09/2025 15  10 - 40 unit/L Final    ALT 04/09/2025 22  10 - 44 unit/L Final    Anion Gap 04/09/2025 7 (L)  8 - 16 mmol/L Final    eGFR 04/09/2025 >60  >60 mL/min/1.73/m2 Final    Magnesium 04/09/2025 2.1  1.6 - 2.6 mg/dL Final    Sodium 04/10/2025 139  136 - 145 mmol/L Final    Potassium 04/10/2025 3.8  3.5 - 5.1 mmol/L Final    Chloride 04/10/2025 105  95 - 110 mmol/L Final    CO2 04/10/2025 27  23 - 29 mmol/L Final    Glucose 04/10/2025 116 (H)  70 - 110 mg/dL Final    BUN 04/10/2025 21 (H)  6 - 20 mg/dL Final    Creatinine 04/10/2025 1.4  0.5 - 1.4 mg/dL Final    Calcium 04/10/2025 9.3  8.7 - 10.5 mg/dL Final    Protein Total 04/10/2025 7.3  6.0 - 8.4 gm/dL Final    Albumin 04/10/2025 3.9  3.5 - 5.2 g/dL Final    Bilirubin Total 04/10/2025 0.9  0.1 - 1.0 mg/dL Final    ALP 04/10/2025 66  55 - 135 unit/L Final    AST 04/10/2025 13  10 - 40 unit/L Final    ALT 04/10/2025 21  10 - 44 unit/L Final    Anion Gap 04/10/2025 7 (L)  8 - 16 mmol/L Final    eGFR 04/10/2025 >60  >60 mL/min/1.73/m2 Final    Magnesium  04/10/2025 2.1  1.6 - 2.6 mg/dL Final    WBC 04/10/2025 10.60  3.90 - 12.70 K/uL Final    RBC 04/10/2025 5.30  4.60 - 6.20 M/uL Final    Hgb 04/10/2025 14.4  14.0 - 18.0 gm/dL Final    Hct 04/10/2025 42.3  40.0 - 54.0 % Final    MCV 04/10/2025 80 (L)  82 - 98 fL Final    MCH 04/10/2025 27.2  27.0 - 31.0 pg Final    MCHC 04/10/2025 34.0  32.0 - 36.0 g/dL Final    RDW 04/10/2025 15.5 (H)  11.5 - 14.5 % Final    Platelet Count 04/10/2025 294  150 - 450 K/uL Final    MPV 04/10/2025 12.0  9.2 - 12.9 fL Final    Nucleated RBC 04/10/2025 0  <=0 /100 WBC Final    Neut % 04/10/2025 53.1  38 - 73 % Final    Lymph % 04/10/2025 35.6  18 - 48 % Final    Mono % 04/10/2025 9.0  4 - 15 % Final    Eos % 04/10/2025 1.4  0 - 8 % Final    Basophil % 04/10/2025 0.4  <=1.9 % Final    Imm Grans % 04/10/2025 0.5  0.0 - 0.5 % Final    Neut # 04/10/2025 5.6  1.8 - 7.7 K/uL Final    Lymph # 04/10/2025 3.77  1 - 4.8 K/uL Final    Mono # 04/10/2025 0.95  0.3 - 1 K/uL Final    Eos # 04/10/2025 0.15  <=0.5 K/uL Final    Baso # 04/10/2025 0.04  <=0.2 K/uL Final    Imm Grans # 04/10/2025 0.05 (H)  0.00 - 0.04 K/uL Final   Hospital Outpatient Visit on 04/02/2025   Component Date Value Ref Range Status    BSA 04/02/2025 2.58  m2 Final    Oglesby's Biplane MOD Ejection Fra* 04/02/2025 33  % Final    A2C EF 04/02/2025 22  % Final    A4C EF 04/02/2025 41  % Final    LVOT stroke volume 04/02/2025 52.7  cm3 Final    LVIDd 04/02/2025 7.6 (A)  3.5 - 6.0 cm Final    LV Systolic Volume 04/02/2025 226  mL Final    LV Systolic Volume Index 04/02/2025 92.2  mL/m2 Final    LVIDs 04/02/2025 6.6 (A)  2.1 - 4.0 cm Final    LV Diastolic Volume 04/02/2025 310  mL Final    LV Diastolic Volume Index 04/02/2025 126.53  mL/m2 Final    LV EDV A2C 04/02/2025 235.746569914648204  mL Final    LV EDV A4C 04/02/2025 253.02  mL Final    Left Ventricular End Systolic Volu* 04/02/2025 226.19  mL Final    Left Ventricular End Diastolic Vol* 04/02/2025 309.82  mL Final    IVS  "04/02/2025 1.2 (A)  0.6 - 1.1 cm Final    LVOT diameter 04/02/2025 2.3  cm Final    LVOT area 04/02/2025 4.2  cm2 Final    FS 04/02/2025 13.2 (A)  28 - 44 % Final    Left Ventricle Relative Wall Thick* 04/02/2025 0.32  cm Final    PW 04/02/2025 1.2 (A)  0.6 - 1.1 cm Final    LV mass 04/02/2025 467.4  g Final    LV Mass Index 04/02/2025 190.8  g/m2 Final    MV Peak E Henry 04/02/2025 1.27  m/s Final    TDI LATERAL 04/02/2025 0.06  m/s Final    TDI SEPTAL 04/02/2025 0.05  m/s Final    E/E' ratio 04/02/2025 23  m/s Final    MV Peak A Henry 04/02/2025 0.67  m/s Final    TR Max Henry 04/02/2025 3.1  m/s Final    E/A ratio 04/02/2025 1.90   Final    IVRT 04/02/2025 60  msec Final    E wave deceleration time 04/02/2025 124  msec Final    MV "A" wave duration 04/02/2025 [id number]  msec Final    LV SEPTAL E/E' RATIO 04/02/2025 25.4  m/s Final    LV LATERAL E/E' RATIO 04/02/2025 21.2  m/s Final    LVOT peak henyr 04/02/2025 0.8  m/s Final    Left Ventricular Outflow Tract Leonela* 04/02/2025 0.55  cm/s Final    Left Ventricular Outflow Tract Leonela* 04/02/2025 1.38  mmHg Final    RV- mendiola basal diam 04/02/2025 2.5  cm Final    RV S' 04/02/2025 16.16  cm/s Final    TAPSE 04/02/2025 2.72  cm Final    RV/LV Ratio 04/02/2025 0.33  cm Final    LA size 04/02/2025 5.1  cm Final    Left Atrium Minor Axis 04/02/2025 6.4  cm Final    Left Atrium Major Axis 04/02/2025 6.9  cm Final    RA Major Axis 04/02/2025 6.44  cm Final    AV mean gradient 04/02/2025 3  mmHg Final    AV peak gradient 04/02/2025 6  mmHg Final    Ao peak henry 04/02/2025 1.2  m/s Final    Ao VTI 04/02/2025 18.4  cm Final    LVOT peak VTI 04/02/2025 12.7  cm Final    AV valve area 04/02/2025 2.9  cm² Final    AV Velocity Ratio 04/02/2025 0.67   Final    AV index (prosthetic) 04/02/2025 0.69   Final    YO by Velocity Ratio 04/02/2025 2.8  cm² Final    Mr max henry 04/02/2025 5.33  m/s Final    MV mean gradient 04/02/2025 3  mmHg Final    MV peak gradient 04/02/2025 7  mmHg " Final    MV stenosis pressure 1/2 time 04/02/2025 53.06  ms Final    MV valve area p 1/2 method 04/02/2025 4.15  cm2 Final    MV valve area by continuity eq 04/02/2025 2.35  cm2 Final    MV VTI 04/02/2025 22.4  cm Final    Triscuspid Valve Regurgitation Pea* 04/02/2025 38  mmHg Final    PV PEAK VELOCITY 04/02/2025 0.82  m/s Final    PV peak gradient 04/02/2025 3  mmHg Final    Pulmonary Valve Mean Velocity 04/02/2025 0.62  m/s Final    Ao root annulus 04/02/2025 3.03  cm Final    IVC diameter 04/02/2025 1.34  cm Final    Mean e' 04/02/2025 0.06  m/s Final    ZLVIDS 04/02/2025 -0.82   Final    ZLVIDD 04/02/2025 -4.80   Final    RVDD 04/02/2025 2.49  cm Final    AORTIC VALVE CUSP SEPERATION 04/02/2025 2.09  cm Final   Admission on 03/11/2025, Discharged on 03/11/2025   Component Date Value Ref Range Status    Hepatitis C Ab 03/11/2025 Negative  Negative Final    HIV 1/2 Ag/Ab 03/11/2025 Negative  Negative Final    WBC 03/11/2025 11.50  3.90 - 12.70 K/uL Final    RBC 03/11/2025 5.25  4.60 - 6.20 M/uL Final    Hemoglobin 03/11/2025 14.2  14.0 - 18.0 g/dL Final    Hematocrit 03/11/2025 40.9  40.0 - 54.0 % Final    MCV 03/11/2025 78 (L)  82 - 98 fL Final    MCH 03/11/2025 27.0  27.0 - 31.0 pg Final    MCHC 03/11/2025 34.7  32.0 - 36.0 g/dL Final    RDW 03/11/2025 14.6 (H)  11.5 - 14.5 % Final    Platelets 03/11/2025 227  150 - 450 K/uL Final    MPV 03/11/2025 12.4  9.2 - 12.9 fL Final    Immature Granulocytes 03/11/2025 0.3  0.0 - 0.5 % Final    Gran # (ANC) 03/11/2025 6.6  1.8 - 7.7 K/uL Final    Immature Grans (Abs) 03/11/2025 0.04  0.00 - 0.04 K/uL Final    Lymph # 03/11/2025 3.9  1.0 - 4.8 K/uL Final    Mono # 03/11/2025 0.8  0.3 - 1.0 K/uL Final    Eos # 03/11/2025 0.1  0.0 - 0.5 K/uL Final    Baso # 03/11/2025 0.05  0.00 - 0.20 K/uL Final    nRBC 03/11/2025 0  0 /100 WBC Final    Gran % 03/11/2025 57.9  38.0 - 73.0 % Final    Lymph % 03/11/2025 33.8  18.0 - 48.0 % Final    Mono % 03/11/2025 7.1  4.0 - 15.0 % Final     Eosinophil % 03/11/2025 0.5  0.0 - 8.0 % Final    Basophil % 03/11/2025 0.4  0.0 - 1.9 % Final    Differential Method 03/11/2025 Automated   Final    Sodium 03/11/2025 142  136 - 145 mmol/L Final    Potassium 03/11/2025 3.4 (L)  3.5 - 5.1 mmol/L Final    Chloride 03/11/2025 103  95 - 110 mmol/L Final    CO2 03/11/2025 29  23 - 29 mmol/L Final    Glucose 03/11/2025 109  70 - 110 mg/dL Final    BUN 03/11/2025 16  6 - 20 mg/dL Final    Creatinine 03/11/2025 1.3  0.5 - 1.4 mg/dL Final    Calcium 03/11/2025 9.2  8.7 - 10.5 mg/dL Final    Total Protein 03/11/2025 7.5  6.0 - 8.4 g/dL Final    Albumin 03/11/2025 4.2  3.5 - 5.2 g/dL Final    Total Bilirubin 03/11/2025 0.8  0.1 - 1.0 mg/dL Final    Alkaline Phosphatase 03/11/2025 66  55 - 135 U/L Final    AST 03/11/2025 24  10 - 40 U/L Final    ALT 03/11/2025 22  10 - 44 U/L Final    eGFR 03/11/2025 >60.0  >60 mL/min/1.73 m^2 Final    Anion Gap 03/11/2025 10  8 - 16 mmol/L Final    BNP 03/11/2025 323 (H)  0 - 99 pg/mL Final    Troponin I High Sensitivity 03/11/2025 27.2 (H)  0.0 - 14.9 pg/mL Final    QRS Duration 03/11/2025 90  ms Final    [facility] QTC Calculation 03/11/2025 495  ms Final    D-Dimer 03/11/2025 0.28  0.00 - 0.49 mg/L FEU Final       Past Medical History:   Diagnosis Date    CHF (congestive heart failure)     Hypertension      Past Surgical History:   Procedure Laterality Date    LEFT HEART CATHETERIZATION Left 3/14/2023    Procedure: Left heart cath;  Surgeon: Joey Sousa MD;  Location: [facility] CATH/EP LAB;  Service: Cardiology;  Laterality: Left;     Family History   Problem Relation Name Age of Onset    Diabetes Mother      Heart disease Mother      Cancer Father      Drug abuse Maternal Aunt      Heart disease Maternal Grandmother      Drug abuse Maternal Grandfather      Hypertension Maternal Grandfather         All of your core healthy metrics are met.      The CVD Risk score (NATALIO'Agostino, et al., 2008) failed to calculate for the following reasons:     "The patient has a prior MI, stroke, CHF, or peripheral vascular disease diagnosis     Marital Status: Single  Alcohol History:  reports that he does not currently use alcohol.  Tobacco History:  reports that he has never smoked. He has never used smokeless tobacco.  Drug History:  reports that he does not currently use drugs.    Health Maintenance Topics with due status: Not Due       Topic Last Completion Date    Influenza Vaccine Not Due    RSV Vaccine (Age 60+ and Pregnant patients) Not Due       There is no immunization history on file for this patient.    Review of patient's allergies indicates:  No Known Allergies  Current Medications[1]        Objective:      Vitals:    06/11/25 1302   BP: 124/72   Pulse: 63   SpO2: 98%   Weight: 130 kg (286 lb 9.6 oz)   Height: 5' 8" (1.727 m)       Physical Exam  Vitals and nursing note reviewed.   Constitutional:       General: He is not in acute distress.     Appearance: Normal appearance. He is well-developed. He is obese.   HENT:      Right Ear: External ear normal.      Left Ear: External ear normal.   Neck:      Trachea: No tracheal deviation.   Cardiovascular:      Rate and Rhythm: Normal rate and regular rhythm.      Heart sounds: No murmur heard.     No friction rub. No gallop.   Pulmonary:      Breath sounds: Normal breath sounds. No stridor. No wheezing or rales.   Abdominal:      Palpations: Abdomen is soft. There is no mass.      Tenderness: There is no abdominal tenderness.   Musculoskeletal:         General: No tenderness or deformity.      Cervical back: Neck supple.   Lymphadenopathy:      Cervical: No cervical adenopathy.   Skin:     General: Skin is warm and dry.   Neurological:      Mental Status: He is alert and oriented to person, place, and time.      Coordination: Coordination normal.   Psychiatric:         Thought Content: Thought content normal.          Assessment:       1. Obesity, unspecified class, unspecified obesity type, unspecified whether " serious comorbidity present    2. Current mild episode of major depressive disorder without prior episode    3. Sleep apnea, unspecified type    4. Acute on chronic combined systolic and diastolic congestive heart failure    5. Primary hypertension           Assessment & Plan    - Assessed progress on Entresto and Zepbound, noting positive weight loss results.  - Evaluated effectiveness of current Zoloft (Sertraline) dosage for depression management.    HEART FAILURE:  - Continue Entresto at current dose for heart failure management.  - Managed prescription through hospital pharmacy; no refill needed yet.  - Discussed prescription coverage with patient.    OBSTRUCTIVE SLEEP APNEA:  - Wenceslao has been contacting the office regarding CPAP machine delivery.  - Instructed office staff to check on status and inform patient by end of day.    DEPRESSION:  - Increase Sertraline from current dose to 100 mg daily (patient to take 2 tablets of current dose).  - Wenceslao is busy with health and personal life, making it difficult to gauge depression improvement, though patient seems to be in better spirits.  - Dosage increase should help with depression management.    ADJUSTMENT DISORDER:  - Increase Zepbound from 2.5 mg to 5 mg weekly injection, starting next Monday.  - Wenceslao is dealing with work, personal, and home life stress, which is affecting mood.    FOLLOW-UP:  - Follow up in 6 weeks.  - Wenceslao to stop at  to schedule next appointment.        Plan:       1. Obesity, unspecified class, unspecified obesity type, unspecified whether serious comorbidity present  Comments:  increase zepbound to 5mg  Orders:  -     tirzepatide, weight loss, (ZEPBOUND) 5 mg/0.5 mL PnIj; Inject 5 mg into the skin every 7 days.  Dispense: 2 mL; Refill: 2    2. Current mild episode of major depressive disorder without prior episode  Comments:  increase zoloft to 100mg  Orders:  -     sertraline (ZOLOFT) 100 MG tablet; Take 1 tablet  (100 mg total) by mouth once daily.  Dispense: 30 tablet; Refill: 11    3. Sleep apnea, unspecified type  Comments:  awaiting supplies  Orders:  -     tirzepatide, weight loss, (ZEPBOUND) 5 mg/0.5 mL PnIj; Inject 5 mg into the skin every 7 days.  Dispense: 2 mL; Refill: 2    4. Acute on chronic combined systolic and diastolic congestive heart failure  Comments:  followed by cardio. entresto    5. Primary hypertension  Comments:  bp controlled      Follow up in about 6 weeks (around 7/23/2025), or if symptoms worsen or fail to improve, for medication management.          Counseled on age and gender appropriate medical preventative services, including cancer screenings, immunizations, overall nutritional health, need for a consistent exercise regimen and an overall push towards maintaining a vigorous and active lifestyle.      This note was generated with the assistance of ambient listening technology. Verbal consent was obtained by the patient and accompanying visitor(s) for the recording of patient appointment to facilitate this note. I attest to having reviewed and edited the generated note for accuracy, though some syntax or spelling errors may persist. Please contact the author of this note for any clarification.       6/26/2025 Aida Snow NP    Answers submitted by the patient for this visit:  Review of Systems Questionnaire (Submitted on 6/5/2025)  activity change: No  hearing loss: No  trouble swallowing: No  eye discharge: No  chest tightness: No  wheezing: No  chest pain: No  palpitations: No  constipation: Yes  vomiting: No  diarrhea: No  polydipsia: No  polyuria: No  difficulty urinating: No  urgency: No  hematuria: No  joint swelling: No  arthralgias: No  headaches: No  weakness: No  confusion: No  dysphoric mood: Yes         [1]   Current Outpatient Medications:     cholecalciferol, vitamin D3, 1,250 mcg (50,000 unit) capsule, Take 1 capsule (50,000 Units total) by mouth every 7 days., Disp: 4 capsule,  Rfl: 2    empagliflozin (JARDIANCE) 10 mg tablet, Take 1 tablet (10 mg total) by mouth once daily., Disp: 90 tablet, Rfl: 3    furosemide (LASIX) 40 MG tablet, Take 1 tablet (40 mg total) by mouth 2 (two) times a day., Disp: 60 tablet, Rfl: 1    metoprolol succinate (TOPROL-XL) 100 MG 24 hr tablet, Take 1 tablet (100 mg total) by mouth nightly., Disp: 30 tablet, Rfl: 11    sacubitriL-valsartan (ENTRESTO) 49-51 mg per tablet, Take 1 tablet by mouth 2 (two) times daily., Disp: 180 tablet, Rfl: 3    spironolactone (ALDACTONE) 50 MG tablet, Take 1 tablet (50 mg total) by mouth 2 (two) times daily., Disp: 60 tablet, Rfl: 11    sertraline (ZOLOFT) 100 MG tablet, Take 1 tablet (100 mg total) by mouth once daily., Disp: 30 tablet, Rfl: 11    tirzepatide, weight loss, (ZEPBOUND) 5 mg/0.5 mL PnIj, Inject 5 mg into the skin every 7 days., Disp: 2 mL, Rfl: 2

## (undated) DEVICE — GUIDEWIRE DOUBLE ENDED .035 DIA. 150CML

## (undated) DEVICE — CATH DXT6JL40X DXTERITY 6F 125CM JL40

## (undated) DEVICE — CATH DXT6JR40X DXTERITY 6F 125CM JR40

## (undated) DEVICE — SHEATH PINNACLE 6FRX10CM W/GUIDEWIRE